# Patient Record
Sex: MALE | Race: WHITE | NOT HISPANIC OR LATINO | Employment: PART TIME | URBAN - METROPOLITAN AREA
[De-identification: names, ages, dates, MRNs, and addresses within clinical notes are randomized per-mention and may not be internally consistent; named-entity substitution may affect disease eponyms.]

---

## 2017-01-11 RX ORDER — GLYBURIDE 5 MG/1
TABLET ORAL
Qty: 180 TAB | Refills: 3 | Status: SHIPPED | OUTPATIENT
Start: 2017-01-11 | End: 2017-02-13

## 2017-01-30 ENCOUNTER — OFFICE VISIT (OUTPATIENT)
Dept: MEDICAL GROUP | Age: 62
End: 2017-01-30
Payer: MEDICARE

## 2017-01-30 VITALS
DIASTOLIC BLOOD PRESSURE: 74 MMHG | SYSTOLIC BLOOD PRESSURE: 130 MMHG | TEMPERATURE: 97.9 F | HEIGHT: 70 IN | BODY MASS INDEX: 42.95 KG/M2 | HEART RATE: 77 BPM | WEIGHT: 300 LBS | OXYGEN SATURATION: 98 %

## 2017-01-30 DIAGNOSIS — Z12.11 SCREENING FOR COLON CANCER: ICD-10-CM

## 2017-01-30 DIAGNOSIS — E78.5 DYSLIPIDEMIA: ICD-10-CM

## 2017-01-30 DIAGNOSIS — I25.2 HISTORY OF ACUTE ANTERIOR WALL MYOCARDIAL INFARCTION: ICD-10-CM

## 2017-01-30 DIAGNOSIS — I10 ESSENTIAL HYPERTENSION: ICD-10-CM

## 2017-01-30 PROCEDURE — G8598 ASA/ANTIPLAT THER USED: HCPCS | Performed by: INTERNAL MEDICINE

## 2017-01-30 PROCEDURE — G8432 DEP SCR NOT DOC, RNG: HCPCS | Performed by: INTERNAL MEDICINE

## 2017-01-30 PROCEDURE — 99214 OFFICE O/P EST MOD 30 MIN: CPT | Performed by: INTERNAL MEDICINE

## 2017-01-30 PROCEDURE — G8482 FLU IMMUNIZE ORDER/ADMIN: HCPCS | Performed by: INTERNAL MEDICINE

## 2017-01-30 PROCEDURE — G8419 CALC BMI OUT NRM PARAM NOF/U: HCPCS | Performed by: INTERNAL MEDICINE

## 2017-01-30 PROCEDURE — 3045F PR MOST RECENT HEMOGLOBIN A1C LEVEL 7.0-9.0%: CPT | Performed by: INTERNAL MEDICINE

## 2017-01-30 PROCEDURE — 3017F COLORECTAL CA SCREEN DOC REV: CPT | Mod: 1P | Performed by: INTERNAL MEDICINE

## 2017-01-30 PROCEDURE — 1036F TOBACCO NON-USER: CPT | Performed by: INTERNAL MEDICINE

## 2017-01-30 RX ORDER — INSULIN GLARGINE 100 [IU]/ML
30 INJECTION, SOLUTION SUBCUTANEOUS EVERY EVENING
Qty: 10 ML | Refills: 6 | Status: SHIPPED | OUTPATIENT
Start: 2017-01-30 | End: 2017-03-01

## 2017-01-30 RX ORDER — METOPROLOL SUCCINATE 100 MG/1
100 TABLET, EXTENDED RELEASE ORAL DAILY
Qty: 90 TAB | Refills: 3 | Status: SHIPPED | OUTPATIENT
Start: 2017-01-30 | End: 2018-07-05 | Stop reason: SDUPTHER

## 2017-01-30 RX ORDER — MOEXIPRIL HYDROCHLORIDE 7.5 MG/1
3.75 TABLET, FILM COATED ORAL DAILY
Qty: 45 TAB | Refills: 3 | Status: SHIPPED | OUTPATIENT
Start: 2017-01-30 | End: 2018-02-21 | Stop reason: SDUPTHER

## 2017-01-30 ASSESSMENT — PATIENT HEALTH QUESTIONNAIRE - PHQ9: CLINICAL INTERPRETATION OF PHQ2 SCORE: 1

## 2017-01-30 NOTE — MR AVS SNAPSHOT
"Perez Leone   2017 2:20 PM   Office Visit   MRN: [id number]    Department:  [address] [place] [address]   Dept Phone:  [phone]    Description:  Male : 1955   Provider:  Elle Cooper M.D.           Reason for Visit     Follow-Up labs      Allergies as of 2017     No Known Allergies      You were diagnosed with     History of acute anterior wall myocardial infarction   [213541]       Unspecified essential hypertension   [401.9.ICD-9-CM]       Uncontrolled type 2 diabetes mellitus without complication, with long-term current use of insulin (CMS-[facility])   [1539221]       Dyslipidemia   [076520]       Essential hypertension   [0292328]       Screening for colon cancer   [451045]         Vital Signs     Blood Pressure Pulse Temperature Height Weight Body Mass Index    130/74 mmHg 77 36.6 °C (97.9 °F) 1.778 m (5' 10\") 136.079 kg (300 lb) 43.05 kg/m2    Oxygen Saturation Smoking Status                98% Never Smoker           Basic Information     Date Of Birth Sex Race Ethnicity Preferred Language    1955 Male White Non- English      Your appointments     Feb 15, 2017  2:20 PM   Diabetes Care Visit with Aiden Daniel M.D., ENDOCRINOLOGY DIABETES RN   [facility]    [address] [facility], [address]  [facility] [address] [phone]           You will be receiving a confirmation call a few days before your appointment from our automated call confirmation system.            May 08, 2017 11:00 AM   Established Patient with Elle Cooper M.D.   [address] [address] [phone]           You will be receiving a confirmation call a few days before your appointment from our automated call confirmation system.              Problem List              ICD-10-CM Priority Class Noted - Resolved    History of acute anterior wall myocardial infarction I25.2   3/9/2012 - Present    CAD " BMS LAD I25.10   3/9/2012 - Present    Obesity E66.9   3/9/2012 - Present    Heme positive stool R19.5   6/8/2015 - Present    Type II diabetes mellitus, uncontrolled (CMS-HCC) E11.65   12/29/2015 - Present    Dyslipidemia E78.5   7/11/2016 - Present    Essential hypertension I10   7/11/2016 - Present      Health Maintenance        Date Due Completion Dates    IMM DTaP/Tdap/Td Vaccine (1 - Tdap) 3/27/1974 ---    COLONOSCOPY 3/27/2005 ---    RETINAL SCREENING 2/15/2015 2/15/2014 (Done)    Override on 2/15/2014: Done (told no DM changes.)    IMM ZOSTER VACCINE 3/27/2015 ---    A1C SCREENING 7/9/2017 1/9/2017, 10/3/2016, 7/11/2016, 3/14/2016, 12/29/2015, 6/23/2015, 8/15/2014 (Done), 1/13/2014, 10/19/2011, 9/6/2008    Override on 8/15/2014: Done (10.8% ( done as POC))    DIABETES MONOFILAMENT / LE EXAM 7/11/2017 7/11/2016, 12/29/2015    FASTING LIPID PROFILE 1/9/2018 1/9/2017, 3/14/2016, 6/23/2015, 1/13/2014, 10/23/2012    URINE ACR / MICROALBUMIN 1/9/2018 1/9/2017, 3/14/2016, 6/23/2015, 1/13/2014    SERUM CREATININE 1/9/2018 1/9/2017, 10/3/2016, 3/14/2016, 6/23/2015, 1/13/2014, 9/6/2008            Current Immunizations     Influenza Vaccine Quad Inj (Preserved) 10/24/2016, 9/30/2015  5:18 PM    Pneumococcal polysaccharide vaccine (PPSV-23) 3/30/2016      Below and/or attached are the medications your provider expects you to take. Review all of your home medications and newly ordered medications with your provider and/or pharmacist. Follow medication instructions as directed by your provider and/or pharmacist. Please keep your medication list with you and share with your provider. Update the information when medications are discontinued, doses are changed, or new medications (including over-the-counter products) are added; and carry medication information at all times in the event of emergency situations     Allergies:  No Known Allergies          Medications  Valid as of: January 30, 2017 -  3:43 PM    Generic Name  Brand Name Tablet Size Instructions for use    Aspirin (Tablet Delayed Response) [facility] ASPIRIN 81 MG QAM.         Atorvastatin Calcium (Tab) LIPITOR 40 MG Take 1 Tab by mouth every evening.        Glucose Blood (Strip) FREESTYLE LITE  USE STRIP TO CHECK GLUCOSE TWICE DAILY *E119*        GlyBURIDE (Tab) DIABETA 5 MG TAKE ONE TABLET BY MOUTH TWICE DAILY WITH  MEALS        Insulin Glargine (Solution) LANTUS 100 UNIT/ML Inject 30 Units as instructed every evening.        MetFORMIN HCl (Tab) GLUCOPHAGE 1000 MG TAKE ONE TABLET BY MOUTH TWICE DAILY WITH MEALS        Metoprolol Succinate (TABLET SR 24 HR) TOPROL  MG Take 1 Tab by mouth every day.        Moexipril HCl (Tab) UNIVASC 7.5 MG Take 0.5 Tabs by mouth every day.        Needles & Syringes (Misc) Needles & Syringes  Use with lantus insulin 20 units hs.        Niacin (Antihyperlipidemic) (Tab CR) NIASPAN 500 MG Take 1 Tab by mouth 3 times a day.        Nitroglycerin (SL Tab) NITROSTAT 0.4 MG Place 1 Tab under tongue as needed.        SITagliptin Phosphate (Tab) JANUVIA 100 MG TAKE ONE TABLET BY MOUTH ONCE DAILY        .                 Medicines prescribed today were sent to:     [facility] [address] [place] [address] (S), [place] - [id number] [facility] (S) [place] [address]    Phone: [phone] Fax: [phone]    Open 24 Hours?: No      Medication refill instructions:       If your prescription bottle indicates you have medication refills left, it is not necessary to call your provider’s office. Please contact your pharmacy and they will refill your medication.    If your prescription bottle indicates you do not have any refills left, you may request refills at any time through one of the following ways: The online [employer] system (except Urgent Care), by calling your provider’s office, or by asking your pharmacy to contact your provider’s office with a refill request. Medication refills are processed only during regular business hours and may not be  available until the next business day. Your provider may request additional information or to have a follow-up visit with you prior to refilling your medication.   *Please Note: Medication refills are assigned a new Rx number when refilled electronically. Your pharmacy may indicate that no refills were authorized even though a new prescription for the same medication is available at the pharmacy. Please request the medicine by name with the pharmacy before contacting your provider for a refill.        Your To Do List     Future Labs/Procedures Complete By Expires    CBC WITH DIFFERENTIAL  As directed 1/31/2018    COMP METABOLIC PANEL  As directed 1/31/2018    HEMOGLOBIN A1C  As directed 1/31/2018    LIPID PROFILE  As directed 1/31/2018      Referral     A referral request has been sent to our patient care coordination department. Please allow 3-5 business days for us to process this request and contact you either by phone or mail. If you do not hear from us by the 5th business day, please call us at [phone].           [employer] Access Code: [email or web address]  Expires: 3/1/2017  1:53 PM    [employer]  A secure, online tool to manage your health information     [employer]’s [employer]® is a secure, online tool that connects you to your personalized health information from the privacy of your home -- day or night - making it very easy for you to manage your healthcare. Once the activation process is completed, you can even access your medical information using the [employer] savannah, which is available for free in the [employer] Savannah store or Google Play store.     [employer] provides the following levels of access (as shown below):   My Chart Features   [facility] Doctor [facility]  Specialists [facility]  Urgent  Care Non-[facility]  Primary Care  Doctor   Email your healthcare team securely and privately 24/7 X X X    Manage appointments: schedule your next appointment; view details of past/upcoming appointments X      Request  prescription refills. X      View recent personal medical records, including lab and immunizations X X X X   View health record, including health history, allergies, medications X X X X   Read reports about your outpatient visits, procedures, consult and ER notes X X X X   See your discharge summary, which is a recap of your hospital and/or ER visit that includes your diagnosis, lab results, and care plan. X X       How to register for [employer]:  1. Go to  [email or web address].  2. Click on the Sign Up Now box, which takes you to the New Member Sign Up page. You will need to provide the following information:  a. Enter your [employer] Access Code exactly as it appears at the top of this page. (You will not need to use this code after you’ve completed the sign-up process. If you do not sign up before the expiration date, you must request a new code.)   b. Enter your date of birth.   c. Enter your home email address.   d. Click Submit, and follow the next screen’s instructions.  3. Create a [employer] ID. This will be your [employer] login ID and cannot be changed, so think of one that is secure and easy to remember.  4. Create a [employer] password. You can change your password at any time.  5. Enter your Password Reset Question and Answer. This can be used at a later time if you forget your password.   6. Enter your e-mail address. This allows you to receive e-mail notifications when new information is available in [employer].  7. Click Sign Up. You can now view your health information.    For assistance activating your [employer] account, call [phone]

## 2017-01-31 NOTE — ASSESSMENT & PLAN NOTE
He is taking Lipitor 40 mg every afternoon and niacin 500 mg daily. He denies side effects from taking Lipitor and niacin. Liver enzymes within normal. LDL at goal.    Results for EFREN DE GUZMAN (MRN [id number]) as of 1/30/2017 19:09   Ref. Range 1/9/2017 07:34   Cholesterol,Tot Latest Ref Range: 100-199 mg/dL 127   Triglycerides Latest Ref Range: 0-149 mg/dL 90   HDL Latest Ref Range: >39 mg/dL 34 (L)   LDL Latest Ref Range: 0-99 mg/dL 75   VLDL Cholesterol Calc Latest Ref Range: 5-40 mg/dL 18

## 2017-01-31 NOTE — ASSESSMENT & PLAN NOTE
Patient is taking metoprolol  mg daily and moexipril 3.75 mg daily. He denies side effects from taking blood pressure medications. His blood pressure is stable with current medication.

## 2017-01-31 NOTE — ASSESSMENT & PLAN NOTE
Patient's recent A1c was 8.1. We increase insulin Lantus from 20 units every afternoon to 25 units every afternoon on 10/24/16. He reported taking metformin 1000 mg twice a day, glyburide 5 mg twice a day, Januvia 100 mg daily, Lantus 25 units every afternoon. He denied missing the dose and stated that he took all medications regularly. We discussed to she which Januvia to GLP-1 agonist last visit and today. However, patient declined to switch the medication. He agreed to increase Lantus insulin to 30 units every afternoon. He will discuss with endocrinologist in upcoming appointment for establish care on 2/15/17.   It is questionable that he is compliant with diet. He also reported that he did not have any physical exercise.  Patient denies side effect from taking glyburide, metformin, Januvia, Lantus insulin. He is overdue for retinal exam. He was referred to ophthalmologist, but did not schedule for follow-up. Reprinted contact information of ophthalmologist and advised to have retinal exam as soon as possible.

## 2017-01-31 NOTE — ASSESSMENT & PLAN NOTE
Patient has history of MI and has stent on LAD. He follows with cardiologist, Dr. Sung. Last visit with  was on 12/19/16. He is taking aspirin 81 mg daily, metoprolol  mg daily, moexipril 7.5 mg half tablet daily. He does not have recurrent chest pain and has not required to take nitroglycerin.

## 2017-01-31 NOTE — PROGRESS NOTES
Subjective:   Perez De Guzman is a 61 y.o. male here today for evaluation and management of:      History of acute anterior wall myocardial infarction  Patient has history of MI and has stent on LAD. He follows with cardiologist, Dr. Sung. Last visit with  was on 12/19/16. He is taking aspirin 81 mg daily, metoprolol  mg daily, moexipril 7.5 mg half tablet daily. He does not have recurrent chest pain and has not required to take nitroglycerin.    Type II diabetes mellitus, uncontrolled  Patient's recent A1c was 8.1. We increase insulin Lantus from 20 units every afternoon to 25 units every afternoon on 10/24/16. He reported taking metformin 1000 mg twice a day, glyburide 5 mg twice a day, Januvia 100 mg daily, Lantus 25 units every afternoon. He denied missing the dose and stated that he took all medications regularly. We discussed to she which Januvia to GLP-1 agonist last visit and today. However, patient declined to switch the medication. He agreed to increase Lantus insulin to 30 units every afternoon. He will discuss with endocrinologist in upcoming appointment for establish care on 2/15/17.   It is questionable that he is compliant with diet. He also reported that he did not have any physical exercise.  Patient denies side effect from taking glyburide, metformin, Januvia, Lantus insulin. He is overdue for retinal exam. He was referred to ophthalmologist, but did not schedule for follow-up. Reprinted contact information of ophthalmologist and advised to have retinal exam as soon as possible.    Dyslipidemia  He is taking Lipitor 40 mg every afternoon and niacin 500 mg daily. He denies side effects from taking Lipitor and niacin. Liver enzymes within normal. LDL at goal.    Results for PEREZ DE GUZMAN (MRN [id number]) as of 1/30/2017 19:09   Ref. Range 1/9/2017 07:34   Cholesterol,Tot Latest Ref Range: 100-199 mg/dL 127   Triglycerides Latest Ref Range: 0-149 mg/dL 90   HDL Latest Ref Range:  ">39 mg/dL 34 (L)   LDL Latest Ref Range: 0-99 mg/dL 75   VLDL Cholesterol Calc Latest Ref Range: 5-40 mg/dL 18       Essential hypertension  Patient is taking metoprolol  mg daily and moexipril 3.75 mg daily. He denies side effects from taking blood pressure medications. His blood pressure is stable with current medication.         Current medicines (including changes today)  Current Outpatient Prescriptions   Medication Sig Dispense Refill   • metoprolol SR (TOPROL XL) 100 MG TABLET SR 24 HR Take 1 Tab by mouth every day. 90 Tab 3   • moexipril (UNIVASC) 7.5 MG Tab Take 0.5 Tabs by mouth every day. 45 Tab 3   • insulin glargine (LANTUS) 100 UNIT/ML Solution Inject 30 Units as instructed every evening. 10 mL 6   • glyBURIDE (DIABETA) 5 MG Tab TAKE ONE TABLET BY MOUTH TWICE DAILY WITH  MEALS 180 Tab 3   • FREESTYLE LITE strip USE STRIP TO CHECK GLUCOSE TWICE DAILY *E119* 100 Strip 0   • metformin (GLUCOPHAGE) 1000 MG tablet TAKE ONE TABLET BY MOUTH TWICE DAILY WITH MEALS 180 Tab 3   • JANUVIA 100 MG Tab TAKE ONE TABLET BY MOUTH ONCE DAILY 90 Tab 3   • nitroglycerin (NITROSTAT) 0.4 MG SL Tab Place 1 Tab under tongue as needed. 10 Tab 0   • Needles & Syringes MISC Use with lantus insulin 20 units hs. 90 Each 3   • atorvastatin (LIPITOR) 40 MG TABS Take 1 Tab by mouth every evening. 90 Each 1   • niacin SR (NIASPAN) 500 MG TBCR Take 1 Tab by mouth 3 times a day. 90 Each 6   • [facility] ASPIRIN 81 MG PO TBEC QAM.        No current facility-administered medications for this visit.     He  has a past medical history of Overweight and obesity; Pure hypercholesterolemia; Acute MI (CMS-HCC); Diabetes mellitus; Hypertension; and Heme positive stool (6/8/2015).    ROS   No chest pain, no shortness of breath, no abdominal pain       Objective:     Blood pressure 130/74, pulse 77, temperature 36.6 °C (97.9 °F), height 1.778 m (5' 10\"), weight 136.079 kg (300 lb), SpO2 98 %. Body mass index is 43.05 kg/(m^2).   Physical " Exam:  General: Alert, oriented and no acute distress.  Eye contact is good, speech goal directed, affect calm  HEENT: conjunctiva non-injected, sclera non-icteric.  Oral mucous membranes pink and moist with no lesions.  Pinna normal.   Lungs: Normal respiratory effort, clear to auscultation bilaterally with good excursion.  CV: regular rate and rhythm. No carotid bruits.  Abdomen: soft, non distended, nontender, No CVAT, Bowel sound normal.  Ext: no edema, color normal, vascularity normal, temperature normal        Assessment and Plan:   The following treatment plan was discussed     1. History of acute anterior wall myocardial infarction  - Chronic and stable. No recurrent chest pain or shortness of breath. Continue current regimens. Follow-up with cardiologist as scheduled.  - Sedentary Lifestyle. Encourage cardio exercise.  - metoprolol SR (TOPROL XL) 100 MG TABLET SR 24 HR; Take 1 Tab by mouth every day.  Dispense: 90 Tab; Refill: 3  - moexipril (UNIVASC) 7.5 MG Tab; Take 0.5 Tabs by mouth every day.  Dispense: 45 Tab; Refill: 3    2. Uncontrolled type 2 diabetes mellitus without complication, with long-term current use of insulin (CMS-HCC)  - Not well-controlled. Refused to try GLP-1 agonist. He will follow with endocrinologist on 2/15/17 for his diabetes.  - Recommended to increase Lantus insulin from 25 units to 30 units every afternoon. Continue metformin 1000 mg twice a day glyburide 5 mg twice a day and Januvia 100 mg daily.  - Recommend to check fasting blood sugar and 2 hour after meal. Seen  - Counseled to comply with medication and diet.   - Counseled signs and symptoms of hypoglycemia and management of hypoglycemia.   - Recommend to have retinal eye exam once a year.  - Advised to check both feet daily.    - insulin glargine (LANTUS) 100 UNIT/ML Solution; Inject 30 Units as instructed every evening.  Dispense: 10 mL; Refill: 6  - COMP METABOLIC PANEL; Future  - HEMOGLOBIN A1C; Future    3.  Dyslipidemia  - Well-controlled. Continue current regimens. Recheck lab 1-2 weeks before next follow up visit.  - COMP METABOLIC PANEL; Future  - LIPID PROFILE; Future    4. Essential hypertension  - Well-controlled. Continue current regimens. Recheck lab 1-2 weeks before next follow up visit.  - metoprolol SR (TOPROL XL) 100 MG TABLET SR 24 HR; Take 1 Tab by mouth every day.  Dispense: 90 Tab; Refill: 3  - moexipril (UNIVASC) 7.5 MG Tab; Take 0.5 Tabs by mouth every day.  Dispense: 45 Tab; Refill: 3  - COMP METABOLIC PANEL; Future  - CBC WITH DIFFERENTIAL; Future    5. Screening for colon cancer  - Patient has heme-positive stool test in 2014. He was referred to GI, but he did not make appointment. He had colonoscopy 5-6 years ago. He did not recall the report. He did not know where he did his colonoscopy.  - We will refer to GI again for screening colon cancer and history of heme-positive stool test.  - REFERRAL TO GASTROENTEROLOGY      Followup: Return in about 4 months (around 5/30/2017), or if symptoms worsen or fail to improve, for diabetes, hypercholesterolemia, hypertension, history of MI, obesity, lab review.      Please note that this dictation was created using voice recognition software. I have made every reasonable attempt to correct obvious errors, but I expect that there may have unintended errors in text, spelling, punctuation, or grammar that I did not discover.

## 2017-02-13 ENCOUNTER — OFFICE VISIT (OUTPATIENT)
Dept: ENDOCRINOLOGY | Facility: MEDICAL CENTER | Age: 62
End: 2017-02-13
Payer: MEDICARE

## 2017-02-13 VITALS
HEIGHT: 72 IN | BODY MASS INDEX: 41.45 KG/M2 | DIASTOLIC BLOOD PRESSURE: 84 MMHG | WEIGHT: 306 LBS | HEART RATE: 74 BPM | OXYGEN SATURATION: 92 % | SYSTOLIC BLOOD PRESSURE: 128 MMHG

## 2017-02-13 DIAGNOSIS — E78.5 TYPE 2 DIABETES MELLITUS WITH HYPERLIPIDEMIA (HCC): ICD-10-CM

## 2017-02-13 DIAGNOSIS — E66.01 MORBID OBESITY DUE TO EXCESS CALORIES (HCC): ICD-10-CM

## 2017-02-13 DIAGNOSIS — Z79.4 ENCOUNTER FOR LONG-TERM (CURRENT) USE OF INSULIN (HCC): ICD-10-CM

## 2017-02-13 DIAGNOSIS — I10 ESSENTIAL HYPERTENSION: ICD-10-CM

## 2017-02-13 DIAGNOSIS — E11.00 UNCONTROLLED TYPE 2 DIABETES MELLITUS WITH HYPEROSMOLARITY WITHOUT COMA, WITH LONG-TERM CURRENT USE OF INSULIN (HCC): ICD-10-CM

## 2017-02-13 DIAGNOSIS — Z79.4 UNCONTROLLED TYPE 2 DIABETES MELLITUS WITH HYPEROSMOLARITY WITHOUT COMA, WITH LONG-TERM CURRENT USE OF INSULIN (HCC): ICD-10-CM

## 2017-02-13 DIAGNOSIS — E11.69 TYPE 2 DIABETES MELLITUS WITH HYPERLIPIDEMIA (HCC): ICD-10-CM

## 2017-02-13 PROCEDURE — G8598 ASA/ANTIPLAT THER USED: HCPCS | Performed by: PHYSICIAN ASSISTANT

## 2017-02-13 PROCEDURE — 99214 OFFICE O/P EST MOD 30 MIN: CPT | Mod: 25 | Performed by: PHYSICIAN ASSISTANT

## 2017-02-13 PROCEDURE — 3017F COLORECTAL CA SCREEN DOC REV: CPT | Mod: 1P | Performed by: PHYSICIAN ASSISTANT

## 2017-02-13 PROCEDURE — G8419 CALC BMI OUT NRM PARAM NOF/U: HCPCS | Performed by: PHYSICIAN ASSISTANT

## 2017-02-13 PROCEDURE — 3045F PR MOST RECENT HEMOGLOBIN A1C LEVEL 7.0-9.0%: CPT | Performed by: PHYSICIAN ASSISTANT

## 2017-02-13 PROCEDURE — G8482 FLU IMMUNIZE ORDER/ADMIN: HCPCS | Performed by: PHYSICIAN ASSISTANT

## 2017-02-13 PROCEDURE — 1036F TOBACCO NON-USER: CPT | Performed by: PHYSICIAN ASSISTANT

## 2017-02-13 RX ORDER — EMPAGLIFLOZIN AND METFORMIN HYDROCHLORIDE 12.5; 1 MG/1; MG/1
1 TABLET ORAL 2 TIMES DAILY
Qty: 60 TAB | Refills: 3 | COMMUNITY
Start: 2017-02-13 | End: 2017-02-22 | Stop reason: SDUPTHER

## 2017-02-13 RX ORDER — LIRAGLUTIDE 6 MG/ML
1.8 INJECTION SUBCUTANEOUS DAILY
Qty: 3 PEN | Refills: 6 | COMMUNITY
Start: 2017-02-13 | End: 2017-02-22 | Stop reason: SDUPTHER

## 2017-02-13 NOTE — PATIENT INSTRUCTIONS
Blood glucose log: Check BG in the morning when wake up, before lunch or dinner and before bed.  So three times a day.  Always bring BG diary to the next office visit.     STOP   Januvia 100mg   Glyburide 5mg twice a day  Metformin 1000mg twice a day    START   Victoza 0.6 injection at night  Synjardy 12.5/1000 one in AM one in PM  LANTUS REDUCE to 20units at night.

## 2017-02-13 NOTE — MR AVS SNAPSHOT
"        Perez Leone   2017 11:20 AM   Office Visit   MRN: [id number]    Department:  Endocrinology [facility]   Dept Phone:  [phone]    Description:  Male : 1955   Provider:  Gulshan Mota PA-C           Allergies as of 2017     No Known Allergies      You were diagnosed with     Uncontrolled type 2 diabetes mellitus with hyperosmolarity without coma, with long-term current use of insulin (CMS-HCC)   [5298010]       Encounter for long-term (current) use of insulin (CMS-HCC)   [V58.67.ICD-9-CM]       Essential hypertension   [5340185]       Type 2 diabetes mellitus with hyperlipidemia (CMS-HCC)   [9181439]       Morbid obesity due to excess calories (CMS-HCC)   [4995643]         Vital Signs     Blood Pressure Pulse Height Weight Body Mass Index Oxygen Saturation    128/84 mmHg 74 1.829 m (6' 0.01\") 138.801 kg (306 lb) 41.49 kg/m2 92%    Smoking Status                   Never Smoker            Basic Information     Date Of Birth Sex Race Ethnicity Preferred Language    1955 Male White Non- English      Your appointments     2017  9:45 AM   Established Patient with Gulshan Mota PA-C   [facility]    [address], [address]  [facility] [address] [phone]           You will be receiving a confirmation call a few days before your appointment from our automated call confirmation system.            May 08, 2017 11:00 AM   Established Patient with Elle Cooper M.D.   [address] [address] [phone]           You will be receiving a confirmation call a few days before your appointment from our automated call confirmation system.              Problem List              ICD-10-CM Priority Class Noted - Resolved    History of acute anterior wall myocardial infarction I25.2   3/9/2012 - Present    CAD BMS LAD I25.10   3/9/2012 - Present    Obesity " E66.9   3/9/2012 - Present    Heme positive stool R19.5   6/8/2015 - Present    Type II diabetes mellitus, uncontrolled (CMS-HCC) E11.65   12/29/2015 - Present    Dyslipidemia E78.5   7/11/2016 - Present    Essential hypertension I10   7/11/2016 - Present    Encounter for long-term (current) use of insulin (CMS-HCC) Z79.4   2/13/2017 - Present    Type 2 diabetes mellitus with hyperlipidemia (CMS-HCC) E11.69, E78.5   2/13/2017 - Present      Health Maintenance        Date Due Completion Dates    IMM DTaP/Tdap/Td Vaccine (1 - Tdap) 3/27/1974 ---    COLONOSCOPY 3/27/2005 ---    RETINAL SCREENING 2/15/2015 2/15/2014 (Done)    Override on 2/15/2014: Done (told no DM changes.)    IMM ZOSTER VACCINE 3/27/2015 ---    A1C SCREENING 7/9/2017 1/9/2017, 10/3/2016, 7/11/2016, 3/14/2016, 12/29/2015, 6/23/2015, 8/15/2014 (Done), 1/13/2014, 10/19/2011, 9/6/2008    Override on 8/15/2014: Done (10.8% ( done as POC))    DIABETES MONOFILAMENT / LE EXAM 7/11/2017 7/11/2016, 12/29/2015    FASTING LIPID PROFILE 1/9/2018 1/9/2017, 3/14/2016, 6/23/2015, 1/13/2014, 10/23/2012    URINE ACR / MICROALBUMIN 1/9/2018 1/9/2017, 3/14/2016, 6/23/2015, 1/13/2014    SERUM CREATININE 1/9/2018 1/9/2017, 10/3/2016, 3/14/2016, 6/23/2015, 1/13/2014, 9/6/2008            Current Immunizations     Influenza Vaccine Quad Inj (Preserved) 10/24/2016, 9/30/2015  5:18 PM    Pneumococcal polysaccharide vaccine (PPSV-23) 3/30/2016      Below and/or attached are the medications your provider expects you to take. Review all of your home medications and newly ordered medications with your provider and/or pharmacist. Follow medication instructions as directed by your provider and/or pharmacist. Please keep your medication list with you and share with your provider. Update the information when medications are discontinued, doses are changed, or new medications (including over-the-counter products) are added; and carry medication information at all times in the event of  emergency situations     Allergies:  No Known Allergies          Medications  Valid as of: February 13, 2017 - 11:36 AM    Generic Name Brand Name Tablet Size Instructions for use    Aspirin (Tablet Delayed Response) ST SANCHEZ ASPIRIN 81 MG QAM.         Atorvastatin Calcium (Tab) LIPITOR 40 MG Take 1 Tab by mouth every evening.        Empagliflozin-Metformin HCl (Tab) SYNJARDY 12.5-1000 MG Take 1 tablet by mouth 2 Times a Day.        Glucose Blood (Strip) FREESTYLE LITE  USE STRIP TO CHECK GLUCOSE TWICE DAILY *E119*        Insulin Glargine (Solution) LANTUS 100 UNIT/ML Inject 30 Units as instructed every evening.        Liraglutide (Solution Pen-injector) VICTOZA 18 MG/3ML Inject 0.3 mL as instructed every day.        Metoprolol Succinate (TABLET SR 24 HR) TOPROL  MG Take 1 Tab by mouth every day.        Moexipril HCl (Tab) UNIVASC 7.5 MG Take 0.5 Tabs by mouth every day.        Needles & Syringes (Misc) Needles & Syringes  Use with lantus insulin 20 units hs.        Niacin (Antihyperlipidemic) (Tab CR) NIASPAN 500 MG Take 1 Tab by mouth 3 times a day.        Nitroglycerin (SL Tab) NITROSTAT 0.4 MG Place 1 Tab under tongue as needed.        .                 Medicines prescribed today were sent to:     [facility] [address] [place] [address] (S), [place] - [address] [address] [facility] (S) [place] [address]    Phone: [phone] Fax: [phone]    Open 24 Hours?: No      Medication refill instructions:       If your prescription bottle indicates you have medication refills left, it is not necessary to call your provider’s office. Please contact your pharmacy and they will refill your medication.    If your prescription bottle indicates you do not have any refills left, you may request refills at any time through one of the following ways: The online [employer] system (except Urgent Care), by calling your provider’s office, or by asking your pharmacy to contact your provider’s office with a refill request. Medication  refills are processed only during regular business hours and may not be available until the next business day. Your provider may request additional information or to have a follow-up visit with you prior to refilling your medication.   *Please Note: Medication refills are assigned a new Rx number when refilled electronically. Your pharmacy may indicate that no refills were authorized even though a new prescription for the same medication is available at the pharmacy. Please request the medicine by name with the pharmacy before contacting your provider for a refill.        Instructions    Blood glucose log: Check BG in the morning when wake up, before lunch or dinner and before bed.  So three times a day.  Always bring BG diary to the next office visit.     STOP   Januvia 100mg   Glyburide 5mg twice a day  Metformin 1000mg twice a day    START   Victoza 0.6 injection at night  Synjardy 12.5/1000 one in AM one in PM  LANTUS REDUCE to 20units at night.          [employer] Access Code: [email or web address]  Expires: 3/1/2017  1:53 PM    Accellion  A secure, online tool to manage your health information     [employer] Accellion® is a secure, online tool that connects you to your personalized health information from the privacy of your home -- day or night - making it very easy for you to manage your healthcare. Once the activation process is completed, you can even access your medical information using the Accellion savannah, which is available for free in the [employer] store or Google Play store.     Accellion provides the following levels of access (as shown below):   My Chart Features   [facility]  Specialists [facility]  Urgent  Care Non-[facility]  Primary Care  Doctor   Email your healthcare team securely and privately 24/7 X X X    Manage appointments: schedule your next appointment; view details of past/upcoming appointments X      Request prescription refills. X      View recent personal medical records,  including lab and immunizations X X X X   View health record, including health history, allergies, medications X X X X   Read reports about your outpatient visits, procedures, consult and ER notes X X X X   See your discharge summary, which is a recap of your hospital and/or ER visit that includes your diagnosis, lab results, and care plan. X X       How to register for [employer]:  1. Go to  [email or web address].  2. Click on the Sign Up Now box, which takes you to the New Member Sign Up page. You will need to provide the following information:  a. Enter your [employer] Access Code exactly as it appears at the top of this page. (You will not need to use this code after you’ve completed the sign-up process. If you do not sign up before the expiration date, you must request a new code.)   b. Enter your date of birth.   c. Enter your home email address.   d. Click Submit, and follow the next screen’s instructions.  3. Create a [employer] ID. This will be your [employer] login ID and cannot be changed, so think of one that is secure and easy to remember.  4. Create a [employer] password. You can change your password at any time.  5. Enter your Password Reset Question and Answer. This can be used at a later time if you forget your password.   6. Enter your e-mail address. This allows you to receive e-mail notifications when new information is available in [employer].  7. Click Sign Up. You can now view your health information.    For assistance activating your [employer] account, call [phone]

## 2017-02-13 NOTE — PROGRESS NOTES
New Patient Consult Note  Referred by: Elle Cooper M.D.    Reason for consult: Diabetes Management Type 2    HPI:  Perez Leone is a 61 y.o. old patient who is seeing us today for diabetes care.  This is a pleasant patient with diabetes and I appreciate the opportunity to participate in the care of this patient.  This is a new patient with me today.    Labs of 1/9/2017 he has a Cr. Of 1.1, HbA1c of 8.1, GFR 66, LDL 75, HDL 34,     BG Diary: did not bring    1. Uncontrolled type 2 diabetes mellitus with hyperosmolarity without coma, with long-term current use of insulin (CMS-HCC)  This patient is currently on Glyburide 5mg BID, Lantus 30, Januvia 100, and Metformin 1000BID      2. Encounter for long-term (current) use of insulin (CMS-HCC)  He is on Lantus currently    3. Essential hypertension  He is on Toprol    4. Type 2 diabetes mellitus with hyperlipidemia (CMS-HCC)  He is on lipitor        ROS:   Constitutional: No change in weight , No fatigue, No night sweats.  HEENT: No Headache.  Eyes:  No blurred vision, No visual changes.  Cardiac: No chest pain, No palpitations.  Resp: No shortness of breath, No cough,   Gastro: No nausea or vomiting, No diarrhea.  Neuro: Denies numbness or tinging in bilateral feet or hands, and no loss of sensation.  Endo: No heat or cold intolerance.  : No polyuria, No polydipsia, No chronic UTI's.  Lower extremities: No lower leg edema bilateral.  All other systems were reviewed and were negative.    Past Medical History:  Patient Active Problem List    Diagnosis Date Noted   • Encounter for long-term (current) use of insulin (CMS-HCC) 02/13/2017   • Type 2 diabetes mellitus with hyperlipidemia (CMS-HCC) 02/13/2017   • Dyslipidemia 07/11/2016   • Essential hypertension 07/11/2016   • Type II diabetes mellitus, uncontrolled (CMS-HCC) 12/29/2015   • Heme positive stool 06/08/2015   • History of acute anterior wall myocardial infarction 03/09/2012   • CAD BMS LAD 03/09/2012    • Obesity 03/09/2012       Past Surgical History:  No past surgical history on file.    Allergies:  Review of patient's allergies indicates no known allergies.    Social History:  Social History     Social History   • Marital Status: Single     Spouse Name: N/A   • Number of Children: N/A   • Years of Education: N/A     Occupational History   • Not on file.     Social History Main Topics   • Smoking status: Never Smoker    • Smokeless tobacco: Never Used   • Alcohol Use: No   • Drug Use: No   • Sexual Activity: No     Other Topics Concern   • Not on file     Social History Narrative    Single    Works at [employer] as a        Family History:  Family History   Problem Relation Age of Onset   • Heart Disease Mother    • Diabetes Brother        Medications:    Current outpatient prescriptions:   •  VICTOZA 18 MG/3ML Solution Pen-injector injection, Inject 0.3 mL as instructed every day., Disp: 3 PEN, Rfl: 6  •  SYNJARDY 12.5-1000 MG Tab, Take 1 tablet by mouth 2 Times a Day., Disp: 60 Tab, Rfl: 3  •  metoprolol SR (TOPROL XL) 100 MG TABLET SR 24 HR, Take 1 Tab by mouth every day., Disp: 90 Tab, Rfl: 3  •  moexipril (UNIVASC) 7.5 MG Tab, Take 0.5 Tabs by mouth every day., Disp: 45 Tab, Rfl: 3  •  insulin glargine (LANTUS) 100 UNIT/ML Solution, Inject 30 Units as instructed every evening., Disp: 10 mL, Rfl: 6  •  FREESTYLE LITE strip, USE STRIP TO CHECK GLUCOSE TWICE DAILY *E119*, Disp: 100 Strip, Rfl: 0  •  nitroglycerin (NITROSTAT) 0.4 MG SL Tab, Place 1 Tab under tongue as needed., Disp: 10 Tab, Rfl: 0  •  Needles & Syringes MISC, Use with lantus insulin 20 units hs., Disp: 90 Each, Rfl: 3  •  atorvastatin (LIPITOR) 40 MG TABS, Take 1 Tab by mouth every evening., Disp: 90 Each, Rfl: 1  •  niacin SR (NIASPAN) 500 MG TBCR, Take 1 Tab by mouth 3 times a day., Disp: 90 Each, Rfl: 6  •  [facility] ASPIRIN 81 MG PO TBEC, QAM. , Disp: , Rfl:     Labs:    Physical Examination:   Vital signs: BP  "128/84 mmHg  Pulse 74  Ht 1.829 m (6' 0.01\")  Wt 138.801 kg (306 lb)  BMI 41.49 kg/m2  SpO2 92%  General: No distress, cooperative, well dressed and well nourished.   Eyes: No scleral icterus or discharge, No hyposphagma  ENMT: Normal on external inspection of nose, lips, No nasal drainage   Neck: No abnormal masses on inspection  Resp: Normal effort, Bilateral clear to auscultation, No wheezing, No rales  CVS: Regular rate and rhythm, S1 S2 normal, No murmur. No gallop  Extremities: No edema bilateral extremities  Neuro: Alert and oriented  Skin: No rash, No Ulcers  Psych: Normal mood and affect  Foot exam: normal sensation to monofilament testing, normal pulses, no ulcers.  Normal Vibration quantitative sensation test.    Assessment and Plan:    1. Uncontrolled type 2 diabetes mellitus with hyperosmolarity without coma, with long-term current use of insulin (CMS-HCC)    STOP   Januvia 100mg   Glyburide 5mg twice a day  Metformin 1000mg twice a day    START   Victoza 0.6 injection at night  Synjardy 12.5/1000 one in AM one in PM  Lantus REDUCE to 20units at night    By getting him on an SGLT2 and Victoza he will start to loose weight. Removing the Glipizide will also help in this as well as a reduction of Lantus.  I am not sure he needs insulin once we get the Victoza up to 1.8 only time will tell.         2. Encounter for long-term (current) use of insulin (CMS-HCC)  I want to decrease Insulin because he is Morbidly obese and we need to get him to loose some weight.      3. Essential hypertension  Stable no changes made    4. Type 2 diabetes mellitus with hyperlipidemia (CMS-HCC)  Stable no change      Return in about 1 week (around 2/20/2017).I will see this patient once a week until he is under better control.     Blood glucose log: Check BG in the morning when wake up, before lunch or dinner and before bed.  So three times a day.  Always bring BG diary to the next office visit.     This patient during " there office visit today may have been started on new medication.  Side effects of new medications were discussed with the patient today in the office. The patient may have been supplied paperwork on this new medication.    Thank you kindly for allowing me to participate in the diabetes care plan for this patient.    Gulshan Mota PA-C, [facility]  02/13/2017    CC:   Elle Cooper M.D.

## 2017-02-22 ENCOUNTER — OFFICE VISIT (OUTPATIENT)
Dept: ENDOCRINOLOGY | Facility: MEDICAL CENTER | Age: 62
End: 2017-02-22
Payer: MEDICARE

## 2017-02-22 VITALS
SYSTOLIC BLOOD PRESSURE: 128 MMHG | HEART RATE: 112 BPM | OXYGEN SATURATION: 92 % | BODY MASS INDEX: 42.23 KG/M2 | HEIGHT: 70 IN | WEIGHT: 295 LBS | DIASTOLIC BLOOD PRESSURE: 76 MMHG

## 2017-02-22 DIAGNOSIS — Z79.4 UNCONTROLLED TYPE 2 DIABETES MELLITUS WITH HYPEROSMOLARITY WITHOUT COMA, WITH LONG-TERM CURRENT USE OF INSULIN (HCC): ICD-10-CM

## 2017-02-22 DIAGNOSIS — E11.69 TYPE 2 DIABETES MELLITUS WITH HYPERLIPIDEMIA (HCC): ICD-10-CM

## 2017-02-22 DIAGNOSIS — E11.00 UNCONTROLLED TYPE 2 DIABETES MELLITUS WITH HYPEROSMOLARITY WITHOUT COMA, WITH LONG-TERM CURRENT USE OF INSULIN (HCC): ICD-10-CM

## 2017-02-22 DIAGNOSIS — I10 ESSENTIAL HYPERTENSION: ICD-10-CM

## 2017-02-22 DIAGNOSIS — Z79.4 ENCOUNTER FOR LONG-TERM (CURRENT) USE OF INSULIN (HCC): ICD-10-CM

## 2017-02-22 DIAGNOSIS — E78.5 TYPE 2 DIABETES MELLITUS WITH HYPERLIPIDEMIA (HCC): ICD-10-CM

## 2017-02-22 PROCEDURE — 3045F PR MOST RECENT HEMOGLOBIN A1C LEVEL 7.0-9.0%: CPT | Performed by: PHYSICIAN ASSISTANT

## 2017-02-22 PROCEDURE — 99214 OFFICE O/P EST MOD 30 MIN: CPT | Mod: 25 | Performed by: PHYSICIAN ASSISTANT

## 2017-02-22 PROCEDURE — G8419 CALC BMI OUT NRM PARAM NOF/U: HCPCS | Performed by: PHYSICIAN ASSISTANT

## 2017-02-22 PROCEDURE — 3017F COLORECTAL CA SCREEN DOC REV: CPT | Mod: 1P | Performed by: PHYSICIAN ASSISTANT

## 2017-02-22 PROCEDURE — 1036F TOBACCO NON-USER: CPT | Performed by: PHYSICIAN ASSISTANT

## 2017-02-22 PROCEDURE — G8482 FLU IMMUNIZE ORDER/ADMIN: HCPCS | Performed by: PHYSICIAN ASSISTANT

## 2017-02-22 PROCEDURE — G8598 ASA/ANTIPLAT THER USED: HCPCS | Performed by: PHYSICIAN ASSISTANT

## 2017-02-22 RX ORDER — LIRAGLUTIDE 6 MG/ML
1.8 INJECTION SUBCUTANEOUS DAILY
Qty: 3 PEN | Refills: 6 | COMMUNITY
Start: 2017-02-22 | End: 2017-03-01 | Stop reason: SDUPTHER

## 2017-02-22 RX ORDER — EMPAGLIFLOZIN AND METFORMIN HYDROCHLORIDE 12.5; 1 MG/1; MG/1
1 TABLET ORAL 2 TIMES DAILY
Qty: 60 TAB | Refills: 3 | COMMUNITY
Start: 2017-02-22 | End: 2017-03-01 | Stop reason: SDUPTHER

## 2017-02-22 NOTE — MR AVS SNAPSHOT
"        Perez Leone   2017 9:45 AM   Office Visit   MRN: [id number]    Department:  Endocrinology [facility]   Dept Phone:  [phone]    Description:  Male : 1955   Provider:  Gulshan Mota PA-C           Reason for Visit     Follow-Up           Allergies as of 2017     No Known Allergies      You were diagnosed with     Type 2 diabetes mellitus with hyperlipidemia (CMS-HCC)   [7281067]       Encounter for long-term (current) use of insulin (CMS-HCC)   [V58.67.ICD-9-CM]       Uncontrolled type 2 diabetes mellitus with hyperosmolarity without coma, with long-term current use of insulin (CMS-HCC)   [7616466]       Essential hypertension   [4351448]         Vital Signs     Blood Pressure Pulse Height Weight Body Mass Index Oxygen Saturation    128/76 mmHg 112 1.778 m (5' 10\") 133.811 kg (295 lb) 42.33 kg/m2 92%    Smoking Status                   Never Smoker            Basic Information     Date Of Birth Sex Race Ethnicity Preferred Language    1955 Male White Non- English      Your appointments     Mar 01, 2017  8:10 AM   Established Patient with Gulshan Mota PA-C   [facility] ([facility]    [address], [address]  [facility] [address] [phone]           You will be receiving a confirmation call a few days before your appointment from our automated call confirmation system.            May 08, 2017 11:00 AM   Established Patient with Elle Cooper M.D.   [address] [address] [phone]           You will be receiving a confirmation call a few days before your appointment from our automated call confirmation system.              Problem List              ICD-10-CM Priority Class Noted - Resolved    History of acute anterior wall myocardial infarction I25.2   3/9/2012 - Present    CAD BMS LAD I25.10   3/9/2012 - Present    Obesity E66.9   3/9/2012 - Present   " Heme positive stool R19.5   6/8/2015 - Present    Type II diabetes mellitus, uncontrolled (CMS-HCC) E11.65   12/29/2015 - Present    Dyslipidemia E78.5   7/11/2016 - Present    Essential hypertension I10   7/11/2016 - Present    Encounter for long-term (current) use of insulin (CMS-HCC) Z79.4   2/13/2017 - Present    Type 2 diabetes mellitus with hyperlipidemia (CMS-HCC) E11.69, E78.5   2/13/2017 - Present      Health Maintenance        Date Due Completion Dates    IMM DTaP/Tdap/Td Vaccine (1 - Tdap) 3/27/1974 ---    COLONOSCOPY 3/27/2005 ---    RETINAL SCREENING 2/15/2015 2/15/2014 (Done)    Override on 2/15/2014: Done (told no DM changes.)    IMM ZOSTER VACCINE 3/27/2015 ---    A1C SCREENING 7/9/2017 1/9/2017, 10/3/2016, 7/11/2016, 3/14/2016, 12/29/2015, 6/23/2015, 8/15/2014 (Done), 1/13/2014, 10/19/2011, 9/6/2008    Override on 8/15/2014: Done (10.8% ( done as POC))    DIABETES MONOFILAMENT / LE EXAM 7/11/2017 7/11/2016, 12/29/2015    FASTING LIPID PROFILE 1/9/2018 1/9/2017, 3/14/2016, 6/23/2015, 1/13/2014, 10/23/2012    URINE ACR / MICROALBUMIN 1/9/2018 1/9/2017, 3/14/2016, 6/23/2015, 1/13/2014    SERUM CREATININE 1/9/2018 1/9/2017, 10/3/2016, 3/14/2016, 6/23/2015, 1/13/2014, 9/6/2008            Current Immunizations     Influenza Vaccine Quad Inj (Preserved) 10/24/2016, 9/30/2015  5:18 PM    Pneumococcal polysaccharide vaccine (PPSV-23) 3/30/2016      Below and/or attached are the medications your provider expects you to take. Review all of your home medications and newly ordered medications with your provider and/or pharmacist. Follow medication instructions as directed by your provider and/or pharmacist. Please keep your medication list with you and share with your provider. Update the information when medications are discontinued, doses are changed, or new medications (including over-the-counter products) are added; and carry medication information at all times in the event of emergency situations      Allergies:  No Known Allergies          Medications  Valid as of: February 22, 2017 - 10:10 AM    Generic Name Brand Name Tablet Size Instructions for use    Aspirin (Tablet Delayed Response) ST SANCHEZ ASPIRIN 81 MG QAM.         Atorvastatin Calcium (Tab) LIPITOR 40 MG Take 1 Tab by mouth every evening.        Empagliflozin-Metformin HCl (Tab) SYNJARDY 12.5-1000 MG Take 1 tablet by mouth 2 Times a Day.        Glucose Blood (Strip) FREESTYLE LITE  USE STRIP TO CHECK GLUCOSE TWICE DAILY *E119*        Insulin Glargine (Solution) LANTUS 100 UNIT/ML Inject 30 Units as instructed every evening.        Liraglutide (Solution Pen-injector) VICTOZA 18 MG/3ML Inject 0.3 mL as instructed every day.        Metoprolol Succinate (TABLET SR 24 HR) TOPROL  MG Take 1 Tab by mouth every day.        Moexipril HCl (Tab) UNIVASC 7.5 MG Take 0.5 Tabs by mouth every day.        Needles & Syringes (Misc) Needles & Syringes  Use with lantus insulin 20 units hs.        Niacin (Antihyperlipidemic) (Tab CR) NIASPAN 500 MG Take 1 Tab by mouth 3 times a day.        Nitroglycerin (SL Tab) NITROSTAT 0.4 MG Place 1 Tab under tongue as needed.        .                 Medicines prescribed today were sent to:     [facility] [address] [place] [address] (S), [place] - [address] ([place]) [place] [address]    Phone: [phone] Fax: [phone]    Open 24 Hours?: No      Medication refill instructions:       If your prescription bottle indicates you have medication refills left, it is not necessary to call your provider’s office. Please contact your pharmacy and they will refill your medication.    If your prescription bottle indicates you do not have any refills left, you may request refills at any time through one of the following ways: The online [employer] system (except Urgent Care), by calling your provider’s office, or by asking your pharmacy to contact your provider’s office with a refill request. Medication refills are processed only  during regular business hours and may not be available until the next business day. Your provider may request additional information or to have a follow-up visit with you prior to refilling your medication.   *Please Note: Medication refills are assigned a new Rx number when refilled electronically. Your pharmacy may indicate that no refills were authorized even though a new prescription for the same medication is available at the pharmacy. Please request the medicine by name with the pharmacy before contacting your provider for a refill.        Instructions    Victoza increase to 1.2  Lantus decrease to 10units    Blood glucose log: Check BG in the morning when wake up, before lunch or dinner and before bed.  So three times a day.  Always bring BG diary to the next office visit.     MAKE SURE BRING BG LOG            1j[employer] Access Code: [email or web address]  Expires: 3/1/2017  1:53 PM    1jiajie  A secure, online tool to manage your health information     [employer]’s 1jiajie® is a secure, online tool that connects you to your personalized health information from the privacy of your home -- day or night - making it very easy for you to manage your healthcare. Once the activation process is completed, you can even access your medical information using the 1jiajie savannah, which is available for free in the [employer] Savannah store or Google Play store.     1jiajie provides the following levels of access (as shown below):   My Chart Features   [facility] Doctor [facility]  Specialists [facility]  Urgent  Care Non-[facility]  Primary Care  Doctor   Email your healthcare team securely and privately 24/7 X X X    Manage appointments: schedule your next appointment; view details of past/upcoming appointments X      Request prescription refills. X      View recent personal medical records, including lab and immunizations X X X X   View health record, including health history, allergies, medications X X X X   Read reports about your  outpatient visits, procedures, consult and ER notes X X X X   See your discharge summary, which is a recap of your hospital and/or ER visit that includes your diagnosis, lab results, and care plan. X X       How to register for [employer]:  1. Go to  [email or web address].  2. Click on the Sign Up Now box, which takes you to the New Member Sign Up page. You will need to provide the following information:  a. Enter your [employer] Access Code exactly as it appears at the top of this page. (You will not need to use this code after you’ve completed the sign-up process. If you do not sign up before the expiration date, you must request a new code.)   b. Enter your date of birth.   c. Enter your home email address.   d. Click Submit, and follow the next screen’s instructions.  3. Create a [employer] ID. This will be your [employer] login ID and cannot be changed, so think of one that is secure and easy to remember.  4. Create a [employer] password. You can change your password at any time.  5. Enter your Password Reset Question and Answer. This can be used at a later time if you forget your password.   6. Enter your e-mail address. This allows you to receive e-mail notifications when new information is available in [employer].  7. Click Sign Up. You can now view your health information.    For assistance activating your [employer] account, call [phone]

## 2017-02-22 NOTE — PROGRESS NOTES
Return to office Patient Consult Note  Referred by: Elle Cooper M.D.    Reason for consult: Diabetes Management Type 2    HPI:  Perez Leone is a 61 y.o. old patient who is seeing us today for diabetes care.  This is a pleasant patient with diabetes and I appreciate the opportunity to participate in the care of this patient.    BG Diary: He forgot today.  I explained that I need to see these numbers for me to help him so to always bring the BG diary    Weight: He was 306pounds and now is 295pounds.     Labs of 1/9/2017 he has a Cr. Of 1.1, HbA1c of 8.1, GFR 66, LDL 75, HDL 34,       1. Type 2 diabetes mellitus with hyperlipidemia (CMS-HCC)  I first saw him on 2/13/17 and I  STOPPED   Januvia 100mg    Glyburide 5mg twice a day  Metformin 1000mg twice a day    I STARTED:   Victoza 0.6 injection at night INCREASE the 1.2  Synjardy 12.5/1000 one in AM one in PM  Lantus 20units  REDUCE to 10units at night    2. Encounter for long-term (current) use of insulin (CMS-HCC)  He is on Lantus but hopefully removing next week    3. Uncontrolled type 2 diabetes mellitus with hyperosmolarity without coma, with long-term current use of insulin (CMS-HCC)  He has lost 11 pounds in just 7 days and he states he feels really good.  I am hopeful we will get him off Insulin next visit    4. Essential hypertension  Doing very good.  We might need to decrease meds if he continues to loose weight        ROS:   Constitutional: No night sweats.  Eyes:  No visual changes.  Cardiac: No chest pain, No palpitations or racing heart rate.  Resp: No shortness of breath, No cough,     All other systems were reviewed and were/are negative.  The ROS was revised/revisited during this office visit from the patients first office visit with me on 2.13.17  Please review the full ROS during the first office visit.    Past Medical History:  Patient Active Problem List    Diagnosis Date Noted   • Encounter for long-term (current) use of insulin  (CMS-HCC) 02/13/2017   • Type 2 diabetes mellitus with hyperlipidemia (CMS-HCC) 02/13/2017   • Dyslipidemia 07/11/2016   • Essential hypertension 07/11/2016   • Type II diabetes mellitus, uncontrolled (CMS-HCC) 12/29/2015   • Heme positive stool 06/08/2015   • History of acute anterior wall myocardial infarction 03/09/2012   • CAD BMS LAD 03/09/2012   • Obesity 03/09/2012       Past Surgical History:  History reviewed. No pertinent past surgical history.    Allergies:  Review of patient's allergies indicates no known allergies.    Social History:  Social History     Social History   • Marital Status: Single     Spouse Name: N/A   • Number of Children: N/A   • Years of Education: N/A     Occupational History   • Not on file.     Social History Main Topics   • Smoking status: Never Smoker    • Smokeless tobacco: Never Used   • Alcohol Use: No   • Drug Use: No   • Sexual Activity: No     Other Topics Concern   • Not on file     Social History Narrative    Single    Works at [employer] as a        Family History:  Family History   Problem Relation Age of Onset   • Heart Disease Mother    • Diabetes Brother        Medications:    Current outpatient prescriptions:   •  SYNJARDY 12.5-1000 MG Tab, Take 1 tablet by mouth 2 Times a Day., Disp: 60 Tab, Rfl: 3  •  VICTOZA 18 MG/3ML Solution Pen-injector injection, Inject 0.3 mL as instructed every day., Disp: 3 PEN, Rfl: 6  •  metoprolol SR (TOPROL XL) 100 MG TABLET SR 24 HR, Take 1 Tab by mouth every day., Disp: 90 Tab, Rfl: 3  •  moexipril (UNIVASC) 7.5 MG Tab, Take 0.5 Tabs by mouth every day., Disp: 45 Tab, Rfl: 3  •  insulin glargine (LANTUS) 100 UNIT/ML Solution, Inject 30 Units as instructed every evening., Disp: 10 mL, Rfl: 6  •  FREESTYLE LITE strip, USE STRIP TO CHECK GLUCOSE TWICE DAILY *E119*, Disp: 100 Strip, Rfl: 0  •  nitroglycerin (NITROSTAT) 0.4 MG SL Tab, Place 1 Tab under tongue as needed., Disp: 10 Tab, Rfl: 0  •  Needles &  "Syringes MISC, Use with lantus insulin 20 units hs., Disp: 90 Each, Rfl: 3  •  atorvastatin (LIPITOR) 40 MG TABS, Take 1 Tab by mouth every evening., Disp: 90 Each, Rfl: 1  •  niacin SR (NIASPAN) 500 MG TBCR, Take 1 Tab by mouth 3 times a day., Disp: 90 Each, Rfl: 6  •   LAURA ASPIRIN 81 MG PO TBEC, QAM. , Disp: , Rfl:     Labs:    Physical Examination:   Vital signs: /76 mmHg  Pulse 112  Ht 1.778 m (5' 10\")  Wt 133.811 kg (295 lb)  BMI 42.33 kg/m2  SpO2 92%  General: No distress, cooperative, well dressed and well nourished.   Eyes: No scleral icterus or discharge, No hyposphagma  ENMT: Normal on external inspection of nose, lips, No nasal drainage   Neck: No abnormal masses on inspection  Resp: Normal effort, Bilateral clear to auscultation, No wheezing, No rales  CVS: Regular rate and rhythm, S1 S2 normal, No murmur. No gallop  Extremities: No edema bilateral extremities  Neuro: Alert and oriented  Skin: No rash, No Ulcers  Psych: Normal mood and affect      Assessment and Plan:    1. Type 2 diabetes mellitus with hyperlipidemia (CMS-[facility])  I first saw him on 2/13/17 and I  STOPPED   Januvia 100mg    Glyburide 5mg twice a day  Metformin 1000mg twice a day    I STARTED: on 2/13/17  Victoza 0.6 injection at night INCREASE the 1.2 on 2/22/17  Synjardy 12.5/1000 one in AM one in PM  Lantus 20units  REDUCE to 10units at night on 2/22/17    2. Encounter for long-term (current) use of insulin (CMS-HCC)  He is on Lantus but hopefully removing next week    3. Uncontrolled type 2 diabetes mellitus with hyperosmolarity without coma, with long-term current use of insulin (CMS-[facility])  He has lost 11 pounds in just 7 days and he states he feels really good.  I am hopeful we will get him off Insulin next visit    4. Essential hypertension  Doing very good.  We might need to decrease meds if he continues to loose weight      Return in about 1 week (around 3/1/2017).    Blood glucose log: Check BG in the morning when " wake up, before lunch or dinner and before bed.  So three times a day.  Always bring BG diary to the next office visit.     Thank you kindly for allowing me to participate in the diabetes care plan for this patient.    Gulshan Mota PA-C, BC-[facility]  02/22/2017    CC:   Elle Cooper M.D.

## 2017-02-22 NOTE — PATIENT INSTRUCTIONS
Victoza increase to 1.2  Lantus decrease to 10units    Blood glucose log: Check BG in the morning when wake up, before lunch or dinner and before bed.  So three times a day.  Always bring BG diary to the next office visit.     MAKE SURE BRING BG LOG

## 2017-03-01 ENCOUNTER — OFFICE VISIT (OUTPATIENT)
Dept: ENDOCRINOLOGY | Facility: MEDICAL CENTER | Age: 62
End: 2017-03-01
Payer: MEDICARE

## 2017-03-01 VITALS
HEART RATE: 112 BPM | DIASTOLIC BLOOD PRESSURE: 86 MMHG | WEIGHT: 298 LBS | SYSTOLIC BLOOD PRESSURE: 132 MMHG | BODY MASS INDEX: 42.66 KG/M2 | HEIGHT: 70 IN | OXYGEN SATURATION: 94 %

## 2017-03-01 DIAGNOSIS — E11.69 TYPE 2 DIABETES MELLITUS WITH HYPERLIPIDEMIA (HCC): ICD-10-CM

## 2017-03-01 DIAGNOSIS — Z79.4 ENCOUNTER FOR LONG-TERM (CURRENT) USE OF INSULIN (HCC): ICD-10-CM

## 2017-03-01 DIAGNOSIS — E11.00 UNCONTROLLED TYPE 2 DIABETES MELLITUS WITH HYPEROSMOLARITY WITHOUT COMA, UNSPECIFIED LONG TERM INSULIN USE STATUS: ICD-10-CM

## 2017-03-01 DIAGNOSIS — E78.5 TYPE 2 DIABETES MELLITUS WITH HYPERLIPIDEMIA (HCC): ICD-10-CM

## 2017-03-01 PROCEDURE — 1036F TOBACCO NON-USER: CPT | Performed by: PHYSICIAN ASSISTANT

## 2017-03-01 PROCEDURE — G8482 FLU IMMUNIZE ORDER/ADMIN: HCPCS | Performed by: PHYSICIAN ASSISTANT

## 2017-03-01 PROCEDURE — 3017F COLORECTAL CA SCREEN DOC REV: CPT | Mod: 1P | Performed by: PHYSICIAN ASSISTANT

## 2017-03-01 PROCEDURE — G8598 ASA/ANTIPLAT THER USED: HCPCS | Performed by: PHYSICIAN ASSISTANT

## 2017-03-01 PROCEDURE — 3045F PR MOST RECENT HEMOGLOBIN A1C LEVEL 7.0-9.0%: CPT | Performed by: PHYSICIAN ASSISTANT

## 2017-03-01 PROCEDURE — G8419 CALC BMI OUT NRM PARAM NOF/U: HCPCS | Performed by: PHYSICIAN ASSISTANT

## 2017-03-01 PROCEDURE — 99214 OFFICE O/P EST MOD 30 MIN: CPT | Mod: 25 | Performed by: PHYSICIAN ASSISTANT

## 2017-03-01 RX ORDER — LIRAGLUTIDE 6 MG/ML
1.8 INJECTION SUBCUTANEOUS DAILY
Qty: 3 PEN | Refills: 6 | COMMUNITY
Start: 2017-03-01 | End: 2017-03-22 | Stop reason: SDUPTHER

## 2017-03-01 RX ORDER — EMPAGLIFLOZIN AND METFORMIN HYDROCHLORIDE 12.5; 1 MG/1; MG/1
1 TABLET ORAL 2 TIMES DAILY
Qty: 60 TAB | Refills: 3 | Status: SHIPPED | OUTPATIENT
Start: 2017-03-01 | End: 2017-03-01 | Stop reason: SDUPTHER

## 2017-03-01 RX ORDER — PIOGLITAZONEHYDROCHLORIDE 15 MG/1
15 TABLET ORAL DAILY
Qty: 30 TAB | Refills: 11 | Status: SHIPPED | OUTPATIENT
Start: 2017-03-01 | End: 2017-03-22 | Stop reason: SDUPTHER

## 2017-03-01 RX ORDER — EMPAGLIFLOZIN AND METFORMIN HYDROCHLORIDE 12.5; 1 MG/1; MG/1
1 TABLET ORAL 2 TIMES DAILY
Qty: 60 TAB | Refills: 3 | COMMUNITY
Start: 2017-03-01 | End: 2017-03-22 | Stop reason: SDUPTHER

## 2017-03-01 RX ORDER — LIRAGLUTIDE 6 MG/ML
1.8 INJECTION SUBCUTANEOUS DAILY
Qty: 3 PEN | Refills: 6 | Status: SHIPPED | OUTPATIENT
Start: 2017-03-01 | End: 2017-03-01 | Stop reason: SDUPTHER

## 2017-03-01 NOTE — PROGRESS NOTES
Return to office Patient Consult Note  Referred by: Elle Cooper M.D.    Reason for consult: Diabetes Management Type 2    HPI:  Perez Leone is a 61 y.o. old patient who is seeing us today for diabetes care.  This is a pleasant patient with diabetes and I appreciate the opportunity to participate in the care of this patient.    BG Diary: 3/1/17  In the AM: 145, 165, 147, 161, 154, 152, 157  Before meal: 155, 160, 150, 163, 160, 164  Before Bed:144, 167, 202, 114, 181, 175    Weight: He was on 2/13/17 306pounds and today he is 298    Labs of 1/9/2017 he has a Cr. Of 1.1, HbA1c of 8.1, GFR 66, LDL 75, HDL 34,       1. Uncontrolled type 2 diabetes mellitus with hyperosmolarity without coma, unspecified long term insulin use status (CMS-HCC)  I first saw him on 2/13/17 and I had him  STOP  Januvia 100mg    Glyburide 5mg twice a day  Metformin 1000mg twice a day    I STARTED: on 2/13/17  Victoza 1.2 injection at night   Synjardy 12.5/1000 one in AM one in PM  Lantus 10units  was at 30units    2. Encounter for long-term (current) use of insulin (CMS-HCC)  Currently on lantus but I will stop today    3. Type 2 diabetes mellitus with hyperlipidemia (CMS-HCC)  We will re-check this in labs on next lab draw        ROS:   Constitutional: No night sweats.  Eyes:  No visual changes.  Cardiac: No chest pain, No palpitations or racing heart rate.  Resp: No shortness of breath, No cough,   Neuro: Denies numbness or tinging in bilateral feet or hands, and no loss of sensation.    All other systems were reviewed and were/are negative.  The ROS was revised/revisited during this office visit from the patients first office visit with me on 2/13/17  Please review the full ROS during the first office visit.    Past Medical History:  Patient Active Problem List    Diagnosis Date Noted   • Encounter for long-term (current) use of insulin (CMS-HCC) 02/13/2017   • Type 2 diabetes mellitus with hyperlipidemia (CMS-HCC) 02/13/2017   •  Dyslipidemia 07/11/2016   • Essential hypertension 07/11/2016   • Type II diabetes mellitus, uncontrolled (CMS-[facility]) 12/29/2015   • Heme positive stool 06/08/2015   • History of acute anterior wall myocardial infarction 03/09/2012   • CAD BMS LAD 03/09/2012   • Obesity 03/09/2012       Past Surgical History:  History reviewed. No pertinent past surgical history.    Allergies:  Review of patient's allergies indicates no known allergies.    Social History:  Social History     Social History   • Marital Status: Single     Spouse Name: N/A   • Number of Children: N/A   • Years of Education: N/A     Occupational History   • Not on file.     Social History Main Topics   • Smoking status: Never Smoker    • Smokeless tobacco: Never Used   • Alcohol Use: No   • Drug Use: No   • Sexual Activity: No     Other Topics Concern   • Not on file     Social History Narrative    Single    Works at [employer] as a        Family History:  Family History   Problem Relation Age of Onset   • Heart Disease Mother    • Diabetes Brother        Medications:    Current outpatient prescriptions:   •  SYNJARDY 12.5-1000 MG Tab, Take 1 tablet by mouth 2 Times a Day., Disp: 60 Tab, Rfl: 3  •  VICTOZA 18 MG/3ML Solution Pen-injector injection, Inject 0.3 mL as instructed every day., Disp: 3 PEN, Rfl: 6  •  pioglitazone (ACTOS) 15 MG Tab, Take 1 Tab by mouth every day for 30 days., Disp: 30 Tab, Rfl: 11  •  metoprolol SR (TOPROL XL) 100 MG TABLET SR 24 HR, Take 1 Tab by mouth every day., Disp: 90 Tab, Rfl: 3  •  moexipril (UNIVASC) 7.5 MG Tab, Take 0.5 Tabs by mouth every day., Disp: 45 Tab, Rfl: 3  •  FREESTYLE LITE strip, USE STRIP TO CHECK GLUCOSE TWICE DAILY *E119*, Disp: 100 Strip, Rfl: 0  •  nitroglycerin (NITROSTAT) 0.4 MG SL Tab, Place 1 Tab under tongue as needed., Disp: 10 Tab, Rfl: 0  •  Needles & Syringes MISC, Use with lantus insulin 20 units hs., Disp: 90 Each, Rfl: 3  •  atorvastatin (LIPITOR) 40 MG TABS,  "Take 1 Tab by mouth every evening., Disp: 90 Each, Rfl: 1  •  niacin SR (NIASPAN) 500 MG TBCR, Take 1 Tab by mouth 3 times a day., Disp: 90 Each, Rfl: 6  •  ST SANCHEZ ASPIRIN 81 MG PO TBEC, QAM. , Disp: , Rfl:     Labs:    Physical Examination:   Vital signs: /86 mmHg  Pulse 112  Ht 1.778 m (5' 10\")  Wt 135.172 kg (298 lb)  BMI 42.76 kg/m2  SpO2 94%  General: No distress, cooperative, well dressed and well nourished.   Eyes: No scleral icterus or discharge, No hyposphagma  ENMT: Normal on external inspection of nose, lips, No nasal drainage   Neck: No abnormal masses on inspection  Resp: Normal effort, Bilateral clear to auscultation, No wheezing, No rales  CVS: Regular rate and rhythm, S1 S2 normal, No murmur. No gallop  Extremities: No edema bilateral extremities  Neuro: Alert and oriented  Skin: No rash, No Ulcers  Psych: Normal mood and affect      Assessment and Plan:    1. Uncontrolled type 2 diabetes mellitus with hyperosmolarity without coma, unspecified long term insulin use status (CMS-HCC)  I first saw him on 2/13/17 and I had him  STOP  Januvia 100mg    Glyburide 5mg twice a day  Metformin 1000mg twice a day    I STARTED: on 2/13/17  Victoza 1.2 injection at night INCREASE to 1.8  Synjardy 12.5/1000 one in AM one in PM  Lantus 10units  was at 30units STOP  Actos 15mg qday start on 3/1/17      2. Encounter for long-term (current) use of insulin (CMS-HCC)  I stopped Lantus today.  He is no longer on Insulin and his Diabetes numbers are better and he is loosing weight.     3. Type 2 diabetes mellitus with hyperlipidemia (CMS-[facility])  This should start to get georgina as his BG comes down.        Return in about 3 weeks (around 3/22/2017).    Blood glucose log: Check BG in the morning when wake up, before lunch or dinner and before bed.  So three times a day.  Always bring BG diary to the next office visit.       Thank you kindly for allowing me to participate in the diabetes care plan for this " patient.    Gulshan Mota PA-C, BC-ADM  03/01/2017    CC:   Elle Cooper M.D.

## 2017-03-01 NOTE — PATIENT INSTRUCTIONS
Victoza 1.2 injection at night INCREASE to 1.8  Synjardy 12.5/1000 one in AM one in PM  Lantus 10units  was at 30units STOP THIS Medication  Actos 15mg qday start on 3/1/17 ( at pharmacy)    Blood glucose log: Check BG in the morning when wake up, before lunch or dinner and before bed.  So three times a day.  Always bring BG diary to the next office visit.

## 2017-03-01 NOTE — MR AVS SNAPSHOT
"        Perez Leone   3/1/2017 8:10 AM   Office Visit   MRN: [id number]    Department:  Endocrinology [facility]   Dept Phone:  [phone]    Description:  Male : 1955   Provider:  Gulshan Mota PA-C           Reason for Visit     Follow-Up           Allergies as of 3/1/2017     No Known Allergies      You were diagnosed with     Uncontrolled type 2 diabetes mellitus with hyperosmolarity without coma, unspecified long term insulin use status (CMS-HCC)   [2279382]       Encounter for long-term (current) use of insulin (CMS-HCC)   [V58.67.ICD-9-CM]       Type 2 diabetes mellitus with hyperlipidemia (CMS-HCC)   [7819898]         Vital Signs     Blood Pressure Pulse Height Weight Body Mass Index Oxygen Saturation    132/86 mmHg 112 1.778 m (5' 10\") 135.172 kg (298 lb) 42.76 kg/m2 94%    Smoking Status                   Never Smoker            Basic Information     Date Of Birth Sex Race Ethnicity Preferred Language    1955 Male White Non- English      Your appointments     Mar 22, 2017  9:30 AM   Established Patient with Gulshan Mota PA-C   [facility] Endocrin[facility]    [address]  [facility] [address] [phone]           You will be receiving a confirmation call a few days before your appointment from our automated call confirmation system.            May 08, 2017 11:00 AM   Established Patient with Elle Cooper M.D.   [address] [address] [phone]           You will be receiving a confirmation call a few days before your appointment from our automated call confirmation system.              Problem List              ICD-10-CM Priority Class Noted - Resolved    History of acute anterior wall myocardial infarction I25.2   3/9/2012 - Present    CAD BMS LAD I25.10   3/9/2012 - Present    Obesity E66.9   3/9/2012 - Present    Heme positive stool R19.5   2015 - " Present    Type II diabetes mellitus, uncontrolled (CMS-HCC) E11.65   12/29/2015 - Present    Dyslipidemia E78.5   7/11/2016 - Present    Essential hypertension I10   7/11/2016 - Present    Encounter for long-term (current) use of insulin (CMS-HCC) Z79.4   2/13/2017 - Present    Type 2 diabetes mellitus with hyperlipidemia (CMS-HCC) E11.69, E78.5   2/13/2017 - Present      Health Maintenance        Date Due Completion Dates    IMM DTaP/Tdap/Td Vaccine (1 - Tdap) 3/27/1974 ---    COLONOSCOPY 3/27/2005 ---    RETINAL SCREENING 2/15/2015 2/15/2014 (Done)    Override on 2/15/2014: Done (told no DM changes.)    IMM ZOSTER VACCINE 3/27/2015 ---    A1C SCREENING 7/9/2017 1/9/2017, 10/3/2016, 7/11/2016, 3/14/2016, 12/29/2015, 6/23/2015, 8/15/2014 (Done), 1/13/2014, 10/19/2011, 9/6/2008    Override on 8/15/2014: Done (10.8% ( done as POC))    DIABETES MONOFILAMENT / LE EXAM 7/11/2017 7/11/2016, 12/29/2015    FASTING LIPID PROFILE 1/9/2018 1/9/2017, 3/14/2016, 6/23/2015, 1/13/2014, 10/23/2012    URINE ACR / MICROALBUMIN 1/9/2018 1/9/2017, 3/14/2016, 6/23/2015, 1/13/2014    SERUM CREATININE 1/9/2018 1/9/2017, 10/3/2016, 3/14/2016, 6/23/2015, 1/13/2014, 9/6/2008            Current Immunizations     Influenza Vaccine Quad Inj (Preserved) 10/24/2016, 9/30/2015  5:18 PM    Pneumococcal polysaccharide vaccine (PPSV-23) 3/30/2016      Below and/or attached are the medications your provider expects you to take. Review all of your home medications and newly ordered medications with your provider and/or pharmacist. Follow medication instructions as directed by your provider and/or pharmacist. Please keep your medication list with you and share with your provider. Update the information when medications are discontinued, doses are changed, or new medications (including over-the-counter products) are added; and carry medication information at all times in the event of emergency situations     Allergies:  No Known Allergies             Medications  Valid as of: March 01, 2017 -  8:12 AM    Generic Name Brand Name Tablet Size Instructions for use    Aspirin (Tablet Delayed Response) ST SANCHEZ ASPIRIN 81 MG QAM.         Atorvastatin Calcium (Tab) LIPITOR 40 MG Take 1 Tab by mouth every evening.        Empagliflozin-Metformin HCl (Tab) SYNJARDY 12.5-1000 MG Take 1 tablet by mouth 2 Times a Day.        Glucose Blood (Strip) FREESTYLE LITE  USE STRIP TO CHECK GLUCOSE TWICE DAILY *E119*        Liraglutide (Solution Pen-injector) VICTOZA 18 MG/3ML Inject 0.3 mL as instructed every day.        Metoprolol Succinate (TABLET SR 24 HR) TOPROL  MG Take 1 Tab by mouth every day.        Moexipril HCl (Tab) UNIVASC 7.5 MG Take 0.5 Tabs by mouth every day.        Needles & Syringes (Misc) Needles & Syringes  Use with lantus insulin 20 units hs.        Niacin (Antihyperlipidemic) (Tab CR) NIASPAN 500 MG Take 1 Tab by mouth 3 times a day.        Nitroglycerin (SL Tab) NITROSTAT 0.4 MG Place 1 Tab under tongue as needed.        Pioglitazone HCl (Tab) ACTOS 15 MG Take 1 Tab by mouth every day for 30 days.        .                 Medicines prescribed today were sent to:     [facility] [place] - [address] [id number] [facility] [place] [address]    Phone: [phone] Fax: [phone]    Open 24 Hours?: No      Medication refill instructions:       If your prescription bottle indicates you have medication refills left, it is not necessary to call your provider’s office. Please contact your pharmacy and they will refill your medication.    If your prescription bottle indicates you do not have any refills left, you may request refills at any time through one of the following ways: The online Blockboard system (except Urgent Care), by calling your provider’s office, or by asking your pharmacy to contact your provider’s office with a refill request. Medication refills are processed only during regular business hours and may not be available  until the next business day. Your provider may request additional information or to have a follow-up visit with you prior to refilling your medication.   *Please Note: Medication refills are assigned a new Rx number when refilled electronically. Your pharmacy may indicate that no refills were authorized even though a new prescription for the same medication is available at the pharmacy. Please request the medicine by name with the pharmacy before contacting your provider for a refill.        Instructions    Victoza 1.2 injection at night INCREASE to 1.8  Synjardy 12.5/1000 one in AM one in PM  Lantus 10units  was at 30units STOP THIS Medication  Actos 15mg qday start on 3/1/17 ( at pharmacy)    Blood glucose log: Check BG in the morning when wake up, before lunch or dinner and before bed.  So three times a day.  Always bring BG diary to the next office visit.             ShelfFlip Access Code: [email or web address]  Expires: 3/1/2017  1:53 PM    ShelfFlip  A secure, online tool to manage your health information     [employer]’s ShelfFlip® is a secure, online tool that connects you to your personalized health information from the privacy of your home -- day or night - making it very easy for you to manage your healthcare. Once the activation process is completed, you can even access your medical information using the ShelfFlip savannah, which is available for free in the Apple Savannah store or Google Play store.     ShelfFlip provides the following levels of access (as shown below):   My Chart Features   [facility] Doctor [facility]  Specialists [facility]  Urgent  Care Non-[facility]  Primary Care  Doctor   Email your healthcare team securely and privately 24/7 X X X    Manage appointments: schedule your next appointment; view details of past/upcoming appointments X      Request prescription refills. X      View recent personal medical records, including lab and immunizations X X X X   View health record, including health history,  allergies, medications X X X X   Read reports about your outpatient visits, procedures, consult and ER notes X X X X   See your discharge summary, which is a recap of your hospital and/or ER visit that includes your diagnosis, lab results, and care plan. X X       How to register for [employer]:  1. Go to  [email or web address].  2. Click on the Sign Up Now box, which takes you to the New Member Sign Up page. You will need to provide the following information:  a. Enter your [employer] Access Code exactly as it appears at the top of this page. (You will not need to use this code after you’ve completed the sign-up process. If you do not sign up before the expiration date, you must request a new code.)   b. Enter your date of birth.   c. Enter your home email address.   d. Click Submit, and follow the next screen’s instructions.  3. Create a [employer]ery ID. This will be your [employer] login ID and cannot be changed, so think of one that is secure and easy to remember.  4. Create a [employer] password. You can change your password at any time.  5. Enter your Password Reset Question and Answer. This can be used at a later time if you forget your password.   6. Enter your e-mail address. This allows you to receive e-mail notifications when new information is available in The [employer]ery.  7. Click Sign Up. You can now view your health information.    For assistance activating your [employer] account, call [phone]

## 2017-03-03 DIAGNOSIS — E11.00 UNCONTROLLED TYPE 2 DIABETES MELLITUS WITH HYPEROSMOLARITY WITHOUT COMA, UNSPECIFIED LONG TERM INSULIN USE STATUS: ICD-10-CM

## 2017-03-03 RX ORDER — LANCETS 28 GAUGE
EACH MISCELLANEOUS
Qty: 100 EACH | Refills: 3 | Status: SHIPPED
Start: 2017-03-03 | End: 2017-10-08 | Stop reason: SDUPTHER

## 2017-03-03 RX ORDER — LANCETS 28 GAUGE
EACH MISCELLANEOUS
Qty: 100 EACH | Refills: 3 | Status: SHIPPED
Start: 2017-03-03 | End: 2017-03-03 | Stop reason: SDUPTHER

## 2017-03-03 NOTE — TELEPHONE ENCOUNTER
Was the patient seen in the last year in this department? Yes 1/30/2017    Does patient have an active prescription for medications requested? Yes     Received Request Via: Pharmacy

## 2017-03-08 ENCOUNTER — PATIENT OUTREACH (OUTPATIENT)
Dept: HEALTH INFORMATION MANAGEMENT | Facility: OTHER | Age: 62
End: 2017-03-08

## 2017-03-09 NOTE — PROGRESS NOTES
Attempt #:1    Verify PCP: yes    Communication Preference Obtained: yes     Annual Wellness Visit Scheduling  1. Scheduling Status:Scheduled       Care Gap Scheduling (Attempt to Schedule EACH Overdue Care Gap!)     Health Maintenance Due   Topic Date Due   • Annual Wellness Visit  SCHEDULED    • IMM DTaP/Tdap/Td Vaccine (1 - Tdap) 03/27/1974   • COLONOSCOPY  03/27/2005   • RETINAL SCREENING  02/15/2015   • IMM ZOSTER VACCINE  03/27/2015         PathJump Activation: declined  PathJump Savannah: no  Virtual Visits: no  Opt In to Text Messages: no

## 2017-03-19 DIAGNOSIS — E11.00 UNCONTROLLED TYPE 2 DIABETES MELLITUS WITH HYPEROSMOLARITY WITHOUT COMA, UNSPECIFIED LONG TERM INSULIN USE STATUS: ICD-10-CM

## 2017-03-20 RX ORDER — BLOOD-GLUCOSE METER
KIT MISCELLANEOUS
Qty: 200 STRIP | Refills: 3 | Status: SHIPPED | OUTPATIENT
Start: 2017-03-20 | End: 2017-03-20 | Stop reason: SDUPTHER

## 2017-03-22 ENCOUNTER — OFFICE VISIT (OUTPATIENT)
Dept: ENDOCRINOLOGY | Facility: MEDICAL CENTER | Age: 62
End: 2017-03-22
Payer: MEDICARE

## 2017-03-22 VITALS
BODY MASS INDEX: 41.52 KG/M2 | HEIGHT: 70 IN | HEART RATE: 110 BPM | DIASTOLIC BLOOD PRESSURE: 88 MMHG | SYSTOLIC BLOOD PRESSURE: 120 MMHG | WEIGHT: 290 LBS | OXYGEN SATURATION: 95 %

## 2017-03-22 DIAGNOSIS — Z79.4 ENCOUNTER FOR LONG-TERM (CURRENT) USE OF INSULIN (HCC): ICD-10-CM

## 2017-03-22 DIAGNOSIS — E11.69 TYPE 2 DIABETES MELLITUS WITH HYPERLIPIDEMIA (HCC): ICD-10-CM

## 2017-03-22 DIAGNOSIS — E78.5 TYPE 2 DIABETES MELLITUS WITH HYPERLIPIDEMIA (HCC): ICD-10-CM

## 2017-03-22 PROCEDURE — G8482 FLU IMMUNIZE ORDER/ADMIN: HCPCS | Performed by: PHYSICIAN ASSISTANT

## 2017-03-22 PROCEDURE — G8419 CALC BMI OUT NRM PARAM NOF/U: HCPCS | Performed by: PHYSICIAN ASSISTANT

## 2017-03-22 PROCEDURE — 1036F TOBACCO NON-USER: CPT | Performed by: PHYSICIAN ASSISTANT

## 2017-03-22 PROCEDURE — 3046F HEMOGLOBIN A1C LEVEL >9.0%: CPT | Mod: 8P | Performed by: PHYSICIAN ASSISTANT

## 2017-03-22 PROCEDURE — 99214 OFFICE O/P EST MOD 30 MIN: CPT | Performed by: PHYSICIAN ASSISTANT

## 2017-03-22 PROCEDURE — G8598 ASA/ANTIPLAT THER USED: HCPCS | Performed by: PHYSICIAN ASSISTANT

## 2017-03-22 RX ORDER — EMPAGLIFLOZIN AND METFORMIN HYDROCHLORIDE 12.5; 1 MG/1; MG/1
1 TABLET ORAL 2 TIMES DAILY
Qty: 60 TAB | Refills: 3 | Status: SHIPPED | OUTPATIENT
Start: 2017-03-22 | End: 2017-05-08

## 2017-03-22 RX ORDER — PIOGLITAZONEHYDROCHLORIDE 15 MG/1
15 TABLET ORAL DAILY
Qty: 30 TAB | Refills: 11 | Status: SHIPPED | OUTPATIENT
Start: 2017-03-22 | End: 2017-04-04

## 2017-03-22 RX ORDER — LIRAGLUTIDE 6 MG/ML
1.8 INJECTION SUBCUTANEOUS DAILY
Qty: 3 PEN | Refills: 6 | Status: SHIPPED | OUTPATIENT
Start: 2017-03-22 | End: 2017-06-26 | Stop reason: SDUPTHER

## 2017-03-22 NOTE — MR AVS SNAPSHOT
"        Perez Leone   3/22/2017 9:30 AM   Office Visit   MRN: [id number]    Department:  Endocrinology [facility]   Dept Phone:  [phone]    Description:  Male : 1955   Provider:  Gulshan Mota PA-C           Reason for Visit     Follow-Up           Allergies as of 3/22/2017     No Known Allergies      You were diagnosed with     Type 2 diabetes mellitus with hyperlipidemia (CMS-HCC)   [7184352]       Encounter for long-term (current) use of insulin (CMS-HCC)   [V58.67.ICD-9-CM]         Vital Signs     Blood Pressure Pulse Height Weight Body Mass Index Oxygen Saturation    120/88 mmHg 110 1.778 m (5' 10\") 131.543 kg (290 lb) 41.61 kg/m2 95%    Smoking Status                   Never Smoker            Basic Information     Date Of Birth Sex Race Ethnicity Preferred Language    1955 Male White Non- English      Your appointments     Mar 22, 2017  9:30 AM   Established Patient with Gulshan Mota PA-C   [facility] Endocrinology [facility]    [address]  [facility] [address] [phone]           You will be receiving a confirmation call a few days before your appointment from our automated call confirmation system.            2017  8:20 AM   ANNUAL EXAM PREVENTATIVE with CYNTHIA Mendosa   [facility]    [address]  [facility] [address] [phone]            May 08, 2017 11:00 AM   Established Patient with Elle Cooper M.D.   [address] [address] [phone]           You will be receiving a confirmation call a few days before your appointment from our automated call confirmation system.            2017 10:40 AM   Established Patient with Gulshan Mota PA-C   [facility] & Endocrinology ([facility])    [address]  [facility] [address] [phone]          " You will be receiving a confirmation call a few days before your appointment from our automated call confirmation system.              Problem List              ICD-10-CM Priority Class Noted - Resolved    History of acute anterior wall myocardial infarction I25.2   3/9/2012 - Present    CAD BMS LAD I25.10   3/9/2012 - Present    Obesity E66.9   3/9/2012 - Present    Heme positive stool R19.5   6/8/2015 - Present    Type II diabetes mellitus, uncontrolled (CMS-HCC) E11.65   12/29/2015 - Present    Dyslipidemia E78.5   7/11/2016 - Present    Essential hypertension I10   7/11/2016 - Present    Encounter for long-term (current) use of insulin (CMS-HCC) Z79.4   2/13/2017 - Present    Type 2 diabetes mellitus with hyperlipidemia (CMS-HCC) E11.69, E78.5   2/13/2017 - Present      Health Maintenance        Date Due Completion Dates    IMM DTaP/Tdap/Td Vaccine (1 - Tdap) 3/27/1974 ---    COLONOSCOPY 3/27/2005 ---    RETINAL SCREENING 2/15/2015 2/15/2014 (Done)    Override on 2/15/2014: Done (told no DM changes.)    IMM ZOSTER VACCINE 3/27/2015 ---    A1C SCREENING 7/9/2017 1/9/2017, 10/3/2016, 7/11/2016, 3/14/2016, 12/29/2015, 6/23/2015, 8/15/2014 (Done), 1/13/2014, 10/19/2011, 9/6/2008    Override on 8/15/2014: Done (10.8% ( done as POC))    DIABETES MONOFILAMENT / LE EXAM 7/11/2017 7/11/2016, 12/29/2015    FASTING LIPID PROFILE 1/9/2018 1/9/2017, 3/14/2016, 6/23/2015, 1/13/2014, 10/23/2012    URINE ACR / MICROALBUMIN 1/9/2018 1/9/2017, 3/14/2016, 6/23/2015, 1/13/2014    SERUM CREATININE 1/9/2018 1/9/2017, 10/3/2016, 3/14/2016, 6/23/2015, 1/13/2014, 9/6/2008            Current Immunizations     Influenza Vaccine Quad Inj (Preserved) 10/24/2016, 9/30/2015  5:18 PM    Pneumococcal polysaccharide vaccine (PPSV-23) 3/30/2016      Below and/or attached are the medications your provider expects you to take. Review all of your home medications and newly ordered medications with your provider and/or pharmacist. Follow medication  instructions as directed by your provider and/or pharmacist. Please keep your medication list with you and share with your provider. Update the information when medications are discontinued, doses are changed, or new medications (including over-the-counter products) are added; and carry medication information at all times in the event of emergency situations     Allergies:  No Known Allergies          Medications  Valid as of: March 22, 2017 -  9:29 AM    Generic Name Brand Name Tablet Size Instructions for use    Aspirin (Tablet Delayed Response) ST SANCHEZ ASPIRIN 81 MG QAM.         Atorvastatin Calcium (Tab) LIPITOR 40 MG Take 1 Tab by mouth every evening.        Empagliflozin-Metformin HCl (Tab) SYNJARDY 12.5-1000 MG Take 1 tablet by mouth 2 Times a Day.        Glucose Blood (Strip) glucose blood  Use one strip to check blood sugar twice a day as needed.        Insulin Pen Needle (Misc) Insulin Pen Needle 32G X 4 MM 1 Applicator by Does not apply route every day. Using one needle tip with victoza per day        Lancets (Misc) FREESTYLE LANCETS  Use one to check glucose twice a day        Liraglutide (Solution Pen-injector) VICTOZA 18 MG/3ML Inject 0.3 mL as instructed every day.        Metoprolol Succinate (TABLET SR 24 HR) TOPROL  MG Take 1 Tab by mouth every day.        Moexipril HCl (Tab) UNIVASC 7.5 MG Take 0.5 Tabs by mouth every day.        Needles & Syringes (Misc) Needles & Syringes  Use with lantus insulin 20 units hs.        Niacin (Antihyperlipidemic) (Tab CR) NIASPAN 500 MG Take 1 Tab by mouth 3 times a day.        Nitroglycerin (SL Tab) NITROSTAT 0.4 MG Place 1 Tab under tongue as needed.        Pioglitazone HCl (Tab) ACTOS 15 MG Take 1 Tab by mouth every day for 30 days.        .                 Medicines prescribed today were sent to:     [facility] [place] - [address] [facility] [place]    [facility] [facility] [place] [address]    Phone: [phone] Fax: [phone]    Open 24 Hours?:  No      Medication refill instructions:       If your prescription bottle indicates you have medication refills left, it is not necessary to call your provider’s office. Please contact your pharmacy and they will refill your medication.    If your prescription bottle indicates you do not have any refills left, you may request refills at any time through one of the following ways: The online Blue Nile system (except Urgent Care), by calling your provider’s office, or by asking your pharmacy to contact your provider’s office with a refill request. Medication refills are processed only during regular business hours and may not be available until the next business day. Your provider may request additional information or to have a follow-up visit with you prior to refilling your medication.   *Please Note: Medication refills are assigned a new Rx number when refilled electronically. Your pharmacy may indicate that no refills were authorized even though a new prescription for the same medication is available at the pharmacy. Please request the medicine by name with the pharmacy before contacting your provider for a refill.           LOOKKhart Status: Patient Declined

## 2017-03-22 NOTE — PROGRESS NOTES
Return to office Patient Consult Note  Referred by: Elle Cooper M.D.    Reason for consult: Diabetes Management Type 2    HPI:  Perez Leone is a 61 y.o. old patient who is seeing us today for diabetes care.  This is a pleasant patient with diabetes and I appreciate the opportunity to participate in the care of this patient.    BG Diary: 3/22/17  In the AM: 131, 136, 152, 131, 112, 130, 136, 119, 128  Before meal:  Before Bed: 143, 145, 106, 134, 125, 136, 130, 125    BG Diary: 3/1/17  In the AM: 145, 165, 147, 161, 154, 152, 157  Before meal: 155, 160, 150, 163, 160, 164  Before Bed:144, 167, 202, 114, 181, 175    Weight: He was on 2/13/17 306pounds and today he is 290    Labs of 1/9/2017 he has a Cr. Of 1.1, HbA1c of 8.1, GFR 66, LDL 75, HDL 34,     He had previously seen Rubén Ambrosio as his Endo      1. Type 2 diabetes mellitus with hyperlipidemia (CMS-HCC)  I first saw him on 2/13/17 and I had him  STOP  Januvia 100mg    Glyburide 5mg twice a day  Metformin 1000mg twice a day  Lantus 30units QHS    I STARTED: on 2/13/17  Victoza 1.8 injection at night   Synjardy 12.5/1000 one in AM one in PM  Lantus has been  STOPPED  Actos 15mg qday start on 3/1/17    2. Encounter for long-term (current) use of insulin (CMS-HCC)  Has now been off Lantus for a week and his numbers are very good  He has lost a total of 16 pounds in just a month      ROS:   Constitutional: No night sweats.  Eyes:  No visual changes.  Cardiac: No chest pain, No palpitations or racing heart rate.  Resp: No shortness of breath, No cough,   Gi: No Diarrhea    All other systems were reviewed and were/are negative.  The ROS was revised/revisited during this office visit from the patients first office visit with me on 2/13/17 Please review the full ROS during the first office visit.    Past Medical History:  Patient Active Problem List    Diagnosis Date Noted   • Encounter for long-term (current) use of insulin (CMS-[facility]) 02/13/2017   • Type 2  diabetes mellitus with hyperlipidemia (CMS-HCC) 02/13/2017   • Dyslipidemia 07/11/2016   • Essential hypertension 07/11/2016   • Type II diabetes mellitus, uncontrolled (CMS-HCC) 12/29/2015   • Heme positive stool 06/08/2015   • History of acute anterior wall myocardial infarction 03/09/2012   • CAD BMS LAD 03/09/2012   • Obesity 03/09/2012       Past Surgical History:  History reviewed. No pertinent past surgical history.    Allergies:  Review of patient's allergies indicates no known allergies.    Social History:  Social History     Social History   • Marital Status: Single     Spouse Name: N/A   • Number of Children: N/A   • Years of Education: N/A     Occupational History   • Not on file.     Social History Main Topics   • Smoking status: Never Smoker    • Smokeless tobacco: Never Used   • Alcohol Use: No   • Drug Use: No   • Sexual Activity: No     Other Topics Concern   • Not on file     Social History Narrative    Single    Works at [employer] as a        Family History:  Family History   Problem Relation Age of Onset   • Heart Disease Mother    • Diabetes Brother        Medications:    Current outpatient prescriptions:   •  glucose blood (FREESTYLE LITE) strip, Use one strip to check blood sugar twice a day as needed., Disp: 200 Strip, Rfl: 3  •  FREESTYLE LANCETS Misc, Use one to check glucose twice a day, Disp: 100 Each, Rfl: 3  •  SYNJARDY 12.5-1000 MG Tab, Take 1 tablet by mouth 2 Times a Day., Disp: 60 Tab, Rfl: 3  •  VICTOZA 18 MG/3ML Solution Pen-injector injection, Inject 0.3 mL as instructed every day., Disp: 3 PEN, Rfl: 6  •  pioglitazone (ACTOS) 15 MG Tab, Take 1 Tab by mouth every day for 30 days., Disp: 30 Tab, Rfl: 11  •  metoprolol SR (TOPROL XL) 100 MG TABLET SR 24 HR, Take 1 Tab by mouth every day., Disp: 90 Tab, Rfl: 3  •  moexipril (UNIVASC) 7.5 MG Tab, Take 0.5 Tabs by mouth every day., Disp: 45 Tab, Rfl: 3  •  nitroglycerin (NITROSTAT) 0.4 MG SL Tab, Place 1  "Tab under tongue as needed., Disp: 10 Tab, Rfl: 0  •  Needles & Syringes MISC, Use with lantus insulin 20 units hs., Disp: 90 Each, Rfl: 3  •  atorvastatin (LIPITOR) 40 MG TABS, Take 1 Tab by mouth every evening., Disp: 90 Each, Rfl: 1  •  niacin SR (NIASPAN) 500 MG TBCR, Take 1 Tab by mouth 3 times a day., Disp: 90 Each, Rfl: 6  •  [facility] ASPIRIN 81 MG PO TBEC, QAM. , Disp: , Rfl:         Physical Examination:   Vital signs: /88 mmHg  Pulse 110  Ht 1.778 m (5' 10\")  Wt 131.543 kg (290 lb)  BMI 41.61 kg/m2  SpO2 95%  General: No distress, cooperative, well dressed and well nourished.   Eyes: No scleral icterus or discharge, No hyposphagma  ENMT: Normal on external inspection of nose, lips, No nasal drainage   Neck: No abnormal masses on inspection  Resp: Normal effort, Bilateral clear to auscultation, No wheezing, No rales  CVS: Regular rate and rhythm, S1 S2 normal, No murmur. No gallop  Extremities: No edema bilateral extremities  Neuro: Alert and oriented  Skin: No rash, No Ulcers  Psych: Normal mood and affect      Assessment and Plan:    1. Type 2 diabetes mellitus with hyperlipidemia (CMS-HCC)  I STARTED: on 2/13/17  Victoza 1.8 injection at night   Synjardy 12.5/1000 one in AM one in PM  Lantus has been  STOPPED  Actos 15mg qday start on 3/1/17    He is doing very well,  He is off insulin has lost 16 pounds in one month his BG numbers are perfect.  I will see him one more time in 3 months and if looking good at that visit he can return to his PCP who can refill his meds for him and manage his Diabetes.     His BP is also becoming better and we will have to consider decreasing BP meds.       2. Encounter for long-term (current) use of insulin (CMS-[facility])  Has now been off Lantus for a week and his numbers are verygood  He has lost a total of 16 pounds in just a month      Return in about 3 months (around 6/22/2017).    Blood glucose log: Check BG in the morning when wake up, before lunch or dinner " and before bed.  So three times a day.  Always bring BG diary to the next office visit.       Thank you kindly for allowing me to participate in the diabetes care plan for this patient.    Vish Mota PA-C, BC-ADM  03/22/2017    CC:   Elle Cooper M.D.

## 2017-04-04 ENCOUNTER — OFFICE VISIT (OUTPATIENT)
Dept: MEDICAL GROUP | Facility: MEDICAL CENTER | Age: 62
End: 2017-04-04
Payer: MEDICARE

## 2017-04-04 VITALS
WEIGHT: 285 LBS | HEART RATE: 94 BPM | DIASTOLIC BLOOD PRESSURE: 82 MMHG | HEIGHT: 72 IN | OXYGEN SATURATION: 96 % | SYSTOLIC BLOOD PRESSURE: 142 MMHG | TEMPERATURE: 97.9 F | BODY MASS INDEX: 38.6 KG/M2

## 2017-04-04 DIAGNOSIS — E78.5 DYSLIPIDEMIA: ICD-10-CM

## 2017-04-04 DIAGNOSIS — I25.2 HISTORY OF ACUTE ANTERIOR WALL MYOCARDIAL INFARCTION: ICD-10-CM

## 2017-04-04 DIAGNOSIS — I10 ESSENTIAL HYPERTENSION: ICD-10-CM

## 2017-04-04 DIAGNOSIS — E11.69 TYPE 2 DIABETES MELLITUS WITH HYPERLIPIDEMIA (HCC): ICD-10-CM

## 2017-04-04 DIAGNOSIS — Z79.4 ENCOUNTER FOR LONG-TERM (CURRENT) USE OF INSULIN (HCC): ICD-10-CM

## 2017-04-04 DIAGNOSIS — R19.5 HEME POSITIVE STOOL: ICD-10-CM

## 2017-04-04 DIAGNOSIS — Z00.00 MEDICARE ANNUAL WELLNESS VISIT, INITIAL: Primary | ICD-10-CM

## 2017-04-04 DIAGNOSIS — Z12.11 ENCOUNTER FOR SCREENING COLONOSCOPY: ICD-10-CM

## 2017-04-04 DIAGNOSIS — Z12.11 SCREEN FOR COLON CANCER: ICD-10-CM

## 2017-04-04 DIAGNOSIS — E78.5 TYPE 2 DIABETES MELLITUS WITH HYPERLIPIDEMIA (HCC): ICD-10-CM

## 2017-04-04 PROCEDURE — 1036F TOBACCO NON-USER: CPT | Performed by: NURSE PRACTITIONER

## 2017-04-04 PROCEDURE — G0438 PPPS, INITIAL VISIT: HCPCS | Performed by: NURSE PRACTITIONER

## 2017-04-04 PROCEDURE — 3045F PR MOST RECENT HEMOGLOBIN A1C LEVEL 7.0-9.0%: CPT | Performed by: NURSE PRACTITIONER

## 2017-04-04 PROCEDURE — G8510 SCR DEP NEG, NO PLAN REQD: HCPCS | Performed by: NURSE PRACTITIONER

## 2017-04-04 ASSESSMENT — PATIENT HEALTH QUESTIONNAIRE - PHQ9: CLINICAL INTERPRETATION OF PHQ2 SCORE: 0

## 2017-04-04 NOTE — PATIENT INSTRUCTIONS
Continue with care through Elle Cooper M.D..  Next Medicare Annual Wellness Visit is due in one year.    Continue care with specialists you are seeing for your chronic problems.    Attached is some preventative information for you to read.          Fall Prevention and Home Safety  Falls cause injuries and can affect all age groups. It is possible to prevent falls.   HOW TO PREVENT FALLS  · Wear shoes with rubber soles that do not have an opening for your toes.   · Keep the inside and outside of your house well lit.   · Use night lights throughout your home.   · Remove clutter from floors.   · Clean up floor spills.   · Remove throw rugs or fasten them to the floor with carpet tape.   · Do not place electrical cords across pathways.   · Put grab bars by your tub, shower, and toilet. Do not use towel bars as grab bars.   · Put handrails on both sides of the stairway. Fix loose handrails.   · Do not climb on stools or stepladders, if possible.   · Do not wax your floors.   · Repair uneven or unsafe sidewalks, walkways, or stairs.   · Keep items you use a lot within reach.   · Be aware of pets.   · Keep emergency numbers next to the telephone.   · Put smoke detectors in your home and near bedrooms.   Ask your doctor what other things you can do to prevent falls.  Document Released: 10/14/2010 Document Revised: 06/18/2013 Document Reviewed: 03/19/2013  ExitCare® Patient Information ©2013 [employer].    Exercise to Stay Healthy      Exercise helps you become and stay healthy.  EXERCISE IDEAS AND TIPS  Choose exercises that:  · You enjoy.   · Fit into your day.   You do not need to exercise really hard to be healthy. You can do exercises at a slow or medium level and stay healthy. You can:  · Stretch before and after working out.   · Try yoga, Pilates, or john chi.   · Lift weights.   · Walk fast, swim, jog, run, climb stairs, bicycle, dance, or rollerskate.   · Take aerobic classes.   Exercises that burn about 150  calories:  · Running 1 ½ miles in 15 minutes.   · Playing volleyball for 45 to 60 minutes.   · Washing and waxing a car for 45 to 60 minutes.   · Playing touch football for 45 minutes.   · Walking 1 ¾ miles in 35 minutes.   · Pushing a stroller 1 ½ miles in 30 minutes.   · Playing basketball for 30 minutes.   · Raking leaves for 30 minutes.   · Bicycling 5 miles in 30 minutes.   · Walking 2 miles in 30 minutes.   · Dancing for 30 minutes.   · Shoveling snow for 15 minutes.   · Swimming laps for 20 minutes.   · Walking up stairs for 15 minutes.   · Bicycling 4 miles in 15 minutes.   · Gardening for 30 to 45 minutes.   · Jumping rope for 15 minutes.   · Washing windows or floors for 45 to 60 minutes.     One suggestion is to start walking for 10 minutes after each meal.  This will help with digestion and help you to metabolize your meal.       For Weight Loss -   Recommend portion sizes with more fruits/vegetables/high fiber foods.  Stay away from white bread/pastas/white rice/white potatoes.  Increase Fluid intake to 6-8 glasses of water daily.   Eliminate soda's (diet and regular) from your fluid intake.      Document Released: 01/20/2012 Document Revised: 03/11/2013 Document Reviewed: 01/20/2012  ExitCare® Patient Information ©2013 [employer].    Fat and Cholesterol Control Diet  Cholesterol is a wax-like substance. It comes from your liver and is found in certain foods. There is good (HDL) and bad (LDL) cholesterol. Too much cholesterol in your blood can affect your heart. Certain foods can lower or raise your cholesterol. Eat foods that are low in cholesterol.  Saturated and trans fats are bad fats found in foods that will raise your cholesterol. Do not eat foods that are high in saturated and trans fats.  FOODS HIGHER IN SATURATED AND TRANS FATS  · Dairy products, such as whole milk, eggs, cheese, cream, and butter.   · Fatty meats, such as hot dogs, sausage, and salami.   · Fried foods.   · Trans fats which  are found in margarine and pre-made cookies, crackers, and baked goods.   · Tropical oils, such as coconut and palm oils.   Read package labels at the store. Do not buy products that use saturated or trans fats or hydrogenated oils. Find foods labeled:  · Low-fat.   · Low-saturated fat.   · Trans-fat-free.   · Low-cholesterol.   FOODS LOWER IN CHOLESTEROL  ·  Fruit.   · Vegetables.   · Beans, peas, and lentils.   · Fish.   · Lean meat, such as chicken (without skin) or ground turkey.   · Grains, such as barley, rice, couscous, bulgur wheat, and pasta.   · Heart-healthy tub margarine.   PREPARING YOUR FOOD  · Broil, bake, steam, or roast foods. Do not aleman food.   · Use non-stick cooking sprays.   · Use lemon or herbs to flavor food instead of using butter or stick margarine.   · Use nonfat yogurt, salsa, or low-fat dressings for salads.   Document Released: 06/18/2013 Document Reviewed: 06/18/2013  ExitCare® Patient Information ©2013 [employer].    Recommend annual flu vaccine.  Next due in Fall, 2015.  If you decide not to have the flu vaccine, recommend good handwashing and staying out of crowds during flu season.        Basic Carbohydrate Counting for Diabetes Mellitus  Carbohydrate counting is a method for keeping track of the amount of carbohydrates you eat. Eating carbohydrates naturally increases the level of sugar (glucose) in your blood, so it is important for you to know the amount that is okay for you to have in every meal. Carbohydrate counting helps keep the level of glucose in your blood within normal limits. The amount of carbohydrates allowed is different for every person. A dietitian can help you calculate the amount that is right for you. Once you know the amount of carbohydrates you can have, you can count the carbohydrates in the foods you want to eat.  Carbohydrates are found in the following foods:  · Grains, such as breads and cereals.  · Dried beans and soy products.  · Starchy vegetables,  "such as potatoes, peas, and corn.  · Fruit and fruit juices.  · Milk and yogurt.  · Sweets and snack foods, such as cake, cookies, candy, chips, soft drinks, and fruit drinks.  CARBOHYDRATE COUNTING  There are two ways to count the carbohydrates in your food. You can use either of the methods or a combination of both.  Reading the \"Nutrition Facts\" on Packaged Food  The \"Nutrition Facts\" is an area that is included on the labels of almost all packaged food and beverages in the [place]. It includes the serving size of that food or beverage and information about the nutrients in each serving of the food, including the grams (g) of carbohydrate per serving.   Decide the number of servings of this food or beverage that you will be able to eat or drink. Multiply that number of servings by the number of grams of carbohydrate that is listed on the label for that serving. The total will be the amount of carbohydrates you will be having when you eat or drink this food or beverage.  Learning Standard Serving Sizes of Food  When you eat food that is not packaged or does not include \"Nutrition Facts\" on the label, you need to measure the servings in order to count the amount of carbohydrates. A serving of most carbohydrate-rich foods contains about 15 g of carbohydrates. The following list includes serving sizes of carbohydrate-rich foods that provide 15 g of carbohydrate per serving:    · 1 slice of bread (1 oz) or 1 six-inch tortilla.    · ½ of a hamburger bun or English muffin.  · 4-6 crackers.  · ¾ cup unsweetened dry cereal.    · ½ cup hot cereal.  ·  cup rice or pasta.    · ½ cup mashed potatoes or ¼ of a large baked potato.  · 1 cup fresh fruit or one small piece of fruit.    · ½ cup canned or frozen fruit or fruit juice.  · 1 cup milk.  ·  cup plain fat-free yogurt or yogurt sweetened with artificial sweeteners.  · ½ cup cooked dried beans or starchy vegetable, such as peas, corn, or potatoes.    Decide the number " of standard-size servings that you will eat. Multiply that number of servings by 15 (the grams of carbohydrates in that serving). For example, if you eat 2 cups of strawberries, you will have eaten 2 servings and 30 g of carbohydrates (2 servings x 15 g = 30 g). For foods such as soups and casseroles, in which more than one food is mixed in, you will need to count the carbohydrates in each food that is included.  EXAMPLE OF CARBOHYDRATE COUNTING  Sample Dinner   · 3 oz chicken breast.  ·  cup of brown rice.  · ½ cup of corn.  · 1 cup milk.    · 1 cup strawberries with sugar-free whipped topping.    Carbohydrate Calculation   Step 1: Identify the foods that contain carbohydrates:   · Rice.    · Corn.    · Milk.    · Strawberries.  Step 2: Calculate the number of servings eaten of each:   · 2 servings of rice.    · 1 serving of corn.    · 1 serving of milk.    · 1 serving of strawberries.  Step 3:  Multiply each of those number of servings by 15 g:   · 2 servings of rice x 15 g = 30 g.    · 1 serving of corn x 15 g = 15 g.    · 1 serving of milk x 15 g = 15 g.    · 1 serving of strawberries x 15 g = 15 g.  Step 4: Add together all of the amounts to find the total grams of carbohydrates eaten: 30 g + 15 g + 15 g + 15 g = 75 g.     This information is not intended to replace advice given to you by your health care provider. Make sure you discuss any questions you have with your health care provider.     Document Released: 12/18/2006 Document Revised: 01/08/2016 Document Reviewed: 11/14/2014  [employer] Interactive Patient Education ©2016 [employer]

## 2017-04-04 NOTE — ASSESSMENT & PLAN NOTE
/84  Chronic condition managed with current medical regimen  Stable per review   Continue with current meds  Followed by Elle Cooper M.D..

## 2017-04-04 NOTE — ASSESSMENT & PLAN NOTE
Patient aware of relationship between weight and health and working on diet  Followed by Elle Cooper M.D..

## 2017-04-04 NOTE — PROGRESS NOTES
CC:   Medicare Annual Wellness Visit    HPI:  Perez is a 62 y.o. here for Medicare Annual Wellness Visit    Patient Active Problem List    Diagnosis Date Noted   • Type 2 diabetes mellitus with hyperlipidemia (CMS-HCC) 02/13/2017   • Dyslipidemia 07/11/2016   • Essential hypertension 07/11/2016   • Type II diabetes mellitus, uncontrolled (CMS-HCC) 12/29/2015   • Heme positive stool 06/08/2015   • History of acute anterior wall myocardial infarction 03/09/2012   • CAD BMS LAD 03/09/2012   • Obesity 03/09/2012     Current Outpatient Prescriptions   Medication Sig Dispense Refill   • SYNJARDY 12.5-1000 MG Tab Take 1 tablet by mouth 2 Times a Day. 60 Tab 3   • VICTOZA 18 MG/3ML Solution Pen-injector injection Inject 0.3 mL as instructed every day. 3 PEN 6   • glucose blood (FREESTYLE LITE) strip Use one strip to check blood sugar twice a day as needed. 200 Strip 3   • FREESTYLE LANCETS Misc Use one to check glucose twice a day 100 Each 3   • metoprolol SR (TOPROL XL) 100 MG TABLET SR 24 HR Take 1 Tab by mouth every day. 90 Tab 3   • moexipril (UNIVASC) 7.5 MG Tab Take 0.5 Tabs by mouth every day. 45 Tab 3   • nitroglycerin (NITROSTAT) 0.4 MG SL Tab Place 1 Tab under tongue as needed. 10 Tab 0   • atorvastatin (LIPITOR) 40 MG TABS Take 1 Tab by mouth every evening. 90 Each 1   • niacin SR (NIASPAN) 500 MG TBCR Take 1 Tab by mouth 3 times a day. 90 Each 6   • [facility] ASPIRIN 81 MG PO TBEC QAM.        No current facility-administered medications for this visit.      Current supplements: no  Chronic narcotic pain medicines: no  Allergies: Review of patient's allergies indicates no known allergies.  Exercise: as raine  Current social contact/activities: Has support of family and friends      Screening:  Depression Screening    Little interest or pleasure in doing things?  0 - not at all  Feeling down, depressed , or hopeless? 0 - not at all  Trouble falling or staying asleep, or sleeping too much?     Feeling tired or having  little energy?     Poor appetite or overeating?     Feeling bad about yourself - or that you are a failure or have let yourself or your family down?    Trouble concentrating on things, such as reading the newspaper or watching television?    Moving or speaking so slowly that other people could have noticed.  Or the opposite - being so fidgety or restless that you have been moving around a lot more than usual?     Thoughts that you would be better off dead, or of hurting yourself?     Patient Health Questionnaire Score:    If depressive symptoms identified deferred to follow up visit unless specifically addressed in assessment and plan.      Screening for Cognitive Impairment    Three Minute Recall (banana, sunrise, fence)  2/3    Draw clock face with all 12 numbers set to the hand to show 10 minures past 11 o'clock  1 5  If cognitive concerns identified deferred to follow up visit unless specifically addressed in assessment and plan.    Fall Risk Assessment    Has the patient had two or more falls in the last year or any fall with injury in the last year?  No  If Fall Risk identified deferred to follow up visit unless specifically addressed in assessment and plan.    Safety Assessment    Throw rugs on floor.  No  Handrails on all stairs.  Yes  Good lighting in all hallways.  Yes  Difficulty hearing.  No  Patient counseled about all safety risks that were identified.    Functional Assessment ADLs    Are there any barriers preventing you from cooking for yourself or meeting nutritional needs?  No.    Are there any barriers preventing you from driving safely or obtaining transportation?  No.    Are there any barriers preventing you from using a telephone or calling for help?  No.    Are there any barriers preventing you from shopping?  No.    Are there any barriers preventing you from taking care of your own finances?  No.    Are there any barriers preventing you from managing your medications?  No.    Are currently  engaing any exercise or physical activity?  No.       Health Maintenance Summary                Annual Wellness Visit Overdue 1955     IMM DTaP/Tdap/Td Vaccine Overdue 3/27/1974     COLONOSCOPY Overdue 3/27/2005     RETINAL SCREENING Overdue 2/15/2015      Done 2/15/2014 told no DM changes.    IMM ZOSTER VACCINE Overdue 3/27/2015     A1C SCREENING Next Due 7/9/2017      Done 1/9/2017 HEMOGLOBIN A1C (A)     Patient has more history with this topic...    DIABETES MONOFILAMENT / LE EXAM Next Due 7/11/2017      Done 7/11/2016 AMB DIABETIC MONOFILAMENT LOWER EXTREMITY EXAM     Patient has more history with this topic...    FASTING LIPID PROFILE Next Due 1/9/2018      Done 1/9/2017 LIPID PANEL (A)     Patient has more history with this topic...    URINE ACR / MICROALBUMIN Next Due 1/9/2018      Done 1/9/2017 MICROALB/CREAT RATIO RAND. UR     Patient has more history with this topic...    SERUM CREATININE Next Due 1/9/2018      Done 1/9/2017 COMP METABOLIC PANEL (A)     Patient has more history with this topic...          Patient Care Team:  Elle Cooper M.D. as PCP - General (Internal Medicine)  Tunde Sung M.D. as Consulting Physician (Cardiology)  Gulshan Mota PA-C as Consulting Physician (Endocrinology)        Social History   Substance Use Topics   • Smoking status: Never Smoker    • Smokeless tobacco: Never Used   • Alcohol Use: No       Family History   Problem Relation Age of Onset   • Heart Disease Mother      CHF   • Diabetes Brother    • Other Father      son does not father's med hx     He  has a past medical history of Overweight and obesity; Pure hypercholesterolemia; Acute MI (CMS-HCC); Diabetes mellitus; Hypertension; and Heme positive stool (6/8/2015).   History reviewed. No pertinent past surgical history.    ROS:    Ostomy or other tubes or amputations: no  Chronic oxygen use no  Last eye exam 2013?, pt requested to sched eye exam for retinal health  : Denies incontinence.   Gait:  Uses no assistive device   Hearing excellent.    Dentition fair    Exam: Blood pressure 142/82, pulse 94, temperature 36.6 °C (97.9 °F), height 1.829 m (6'), weight 129.275 kg (285 lb), SpO2 96 %. Body mass index is 38.64 kg/(m^2).  Alert, oriented in no acute distress.  Eye contact is good, speech goal directed, affect calm      Assessment and Plan. The following treatment and monitoring plan is recommended for all problems as listed below:   History of acute anterior wall myocardial infarction  Followed by cardiology q 6 months  Denies cardiac sxs  Continue with current medications     CAD BMS LAD  Followed by cardiology q 6 months  Denies cardiac sxs  Continue with current medications     Obesity  Patient aware of relationship between weight and health and working on diet  Followed by Elle Cooper M.D..     Type II diabetes mellitus, uncontrolled  Chronic condition managed with current medical regimen  1/9/2017    Glucose 184 (H) 65 - 99 mg/dL Final   A1C 8.1  States home fasting glucose this am 130   Continue with current meds and attention to dietary intake  Followed by Elle Cooper M.D..        Dyslipidemia  Chronic condition managed with current medical regimen  1/9/2017   Cholesterol,Tot 127 100 - 199 mg/dL Final      Triglycerides 90 0 - 149 mg/dL Final     HDL 34 (L) >39 mg/dL Final     VLDL Cholesterol Calc 18 5 - 40 mg/dL Final     LDL 75 0 - 99 mg/dL    Stable per review   Continue with current meds  Followed by Elle Cooper M.D..        Essential hypertension  /84  Chronic condition managed with current medical regimen  Stable per review   Continue with current meds  Followed by Elle Cooper M.D..        Type 2 diabetes mellitus with hyperlipidemia (CMS-HCC)  Chronic condition managed with current medical regimen  Lipids wnl, glucose and A1VC abnormal   Continue with current meds  Followed by Elle Cooper M.D..        Heme positive stool  Pt states no obvious blood in his  stool  States will not have a colonoscopy, discussed reasons to consider having the testing  Order placed in chart today and pt will reconsider  Order placed for FIT, pt agrees and will send stool sample     Encounter for long-term (current) use of insulin (CMS-[facility])  No longer on insulin        Health Care Screening recommendations reviewed with patient today: per Patient Instructions  DPA/Advanced directive: .has NO advanced directive - not interested in additional information    Next office visit for recheck of chronic medical conditions is due with Elle Cooper M.D.5/8/2017

## 2017-04-04 NOTE — ASSESSMENT & PLAN NOTE
Chronic condition managed with current medical regimen  1/9/2017    Glucose 184 (H) 65 - 99 mg/dL Final   A1C 8.1  States home fasting glucose this am 130   Continue with current meds and attention to dietary intake  Followed by Elle Cooper M.D..

## 2017-04-04 NOTE — ASSESSMENT & PLAN NOTE
Chronic condition managed with current medical regimen  Lipids wnl, glucose and A1VC abnormal   Continue with current meds  Followed by Elle Cooper M.D..

## 2017-04-04 NOTE — MR AVS SNAPSHOT
Perez Leone   2017 8:20 AM   Office Visit   MRN: [id number]    Department:  [facility]   Dept Phone:  [phone]    Description:  Male : 1955   Provider:  CYNTHIA Mendosa           Reason for Visit     Annual Exam initial      Allergies as of 2017     No Known Allergies      You were diagnosed with     Medicare annual wellness visit, initial   [370111]  -  Primary     History of acute anterior wall myocardial infarction   [620858]       Dyslipidemia   [135154]       Essential hypertension   [2024933]       Type 2 diabetes mellitus with hyperlipidemia (CMS-HCC)   [3101216]       Heme positive stool   [212374]       Encounter for long-term (current) use of insulin (CMS-[facility])   [V58.67.ICD-9-CM]       Screen for colon cancer   [568514]       Encounter for screening colonoscopy   [203761]         Vital Signs     Blood Pressure Pulse Temperature Height Weight Body Mass Index    142/82 mmHg 94 36.6 °C (97.9 °F) 1.829 m (6') 129.275 kg (285 lb) 38.64 kg/m2    Oxygen Saturation Smoking Status                96% Never Smoker           Basic Information     Date Of Birth Sex Race Ethnicity Preferred Language    1955 Male White Non- English      Your appointments     May 08, 2017 11:00 AM   Established Patient with Elle Cooper M.D.   [facility] [address] [place] [address] [phone]           You will be receiving a confirmation call a few days before your appointment from our automated call confirmation system.            2017 10:40 AM   Established Patient with Gulshan Mota PA-C   [facility] ([facility])    [address]  [facility] [address] [phone]           You will be receiving a confirmation call a few days before your appointment from our automated call confirmation system.              Problem List              ICD-10-CM Priority Class  Noted - Resolved    History of acute anterior wall myocardial infarction I25.2   3/9/2012 - Present    CAD BMS LAD I25.10   3/9/2012 - Present    Obesity E66.9   3/9/2012 - Present    Heme positive stool R19.5   6/8/2015 - Present    Type II diabetes mellitus, uncontrolled (CMS-HCC) E11.65   12/29/2015 - Present    Dyslipidemia E78.5   7/11/2016 - Present    Essential hypertension I10   7/11/2016 - Present    Type 2 diabetes mellitus with hyperlipidemia (CMS-HCC) E11.69, E78.5   2/13/2017 - Present      Health Maintenance        Date Due Completion Dates    IMM DTaP/Tdap/Td Vaccine (1 - Tdap) 3/27/1974 ---    COLONOSCOPY 3/27/2005 ---    RETINAL SCREENING 2/15/2015 2/15/2014 (Done)    Override on 2/15/2014: Done (told no DM changes.)    IMM ZOSTER VACCINE 3/27/2015 ---    A1C SCREENING 7/9/2017 1/9/2017, 10/3/2016, 7/11/2016, 3/14/2016, 12/29/2015, 6/23/2015, 8/15/2014 (Done), 1/13/2014, 10/19/2011, 9/6/2008    Override on 8/15/2014: Done (10.8% ( done as POC))    DIABETES MONOFILAMENT / LE EXAM 7/11/2017 7/11/2016, 12/29/2015    FASTING LIPID PROFILE 1/9/2018 1/9/2017, 3/14/2016, 6/23/2015, 1/13/2014, 10/23/2012    URINE ACR / MICROALBUMIN 1/9/2018 1/9/2017, 3/14/2016, 6/23/2015, 1/13/2014    SERUM CREATININE 1/9/2018 1/9/2017, 10/3/2016, 3/14/2016, 6/23/2015, 1/13/2014, 9/6/2008            Current Immunizations     Influenza Vaccine Quad Inj (Preserved) 10/24/2016, 9/30/2015  5:18 PM    Pneumococcal polysaccharide vaccine (PPSV-23) 3/30/2016      Below and/or attached are the medications your provider expects you to take. Review all of your home medications and newly ordered medications with your provider and/or pharmacist. Follow medication instructions as directed by your provider and/or pharmacist. Please keep your medication list with you and share with your provider. Update the information when medications are discontinued, doses are changed, or new medications (including over-the-counter products) are added;  and carry medication information at all times in the event of emergency situations     Allergies:  No Known Allergies          Medications  Valid as of: April 04, 2017 -  8:46 AM    Generic Name Brand Name Tablet Size Instructions for use    Aspirin (Tablet Delayed Response) ST SANCHEZ ASPIRIN 81 MG QAM.         Atorvastatin Calcium (Tab) LIPITOR 40 MG Take 1 Tab by mouth every evening.        Empagliflozin-Metformin HCl (Tab) SYNJARDY 12.5-1000 MG Take 1 tablet by mouth 2 Times a Day.        Glucose Blood (Strip) glucose blood  Use one strip to check blood sugar twice a day as needed.        Lancets (Misc) FREESTYLE LANCETS  Use one to check glucose twice a day        Liraglutide (Solution Pen-injector) VICTOZA 18 MG/3ML Inject 0.3 mL as instructed every day.        Metoprolol Succinate (TABLET SR 24 HR) TOPROL  MG Take 1 Tab by mouth every day.        Moexipril HCl (Tab) UNIVASC 7.5 MG Take 0.5 Tabs by mouth every day.        Niacin (Antihyperlipidemic) (Tab CR) NIASPAN 500 MG Take 1 Tab by mouth 3 times a day.        Nitroglycerin (SL Tab) NITROSTAT 0.4 MG Place 1 Tab under tongue as needed.        .                 Medicines prescribed today were sent to:     [facility] [address] [place] [address] (S), [place] - [address] [address] [facility] (S) [place] [address]    Phone: [phone] Fax: [phone]    Open 24 Hours?: No      Medication refill instructions:       If your prescription bottle indicates you have medication refills left, it is not necessary to call your provider’s office. Please contact your pharmacy and they will refill your medication.    If your prescription bottle indicates you do not have any refills left, you may request refills at any time through one of the following ways: The online [employer] system (except Urgent Care), by calling your provider’s office, or by asking your pharmacy to contact your provider’s office with a refill request. Medication refills are processed only during regular  business hours and may not be available until the next business day. Your provider may request additional information or to have a follow-up visit with you prior to refilling your medication.   *Please Note: Medication refills are assigned a new Rx number when refilled electronically. Your pharmacy may indicate that no refills were authorized even though a new prescription for the same medication is available at the pharmacy. Please request the medicine by name with the pharmacy before contacting your provider for a refill.        Instructions    Continue with care through Elle Cooper M.D..  Next Medicare Annual Wellness Visit is due in one year.    Continue care with specialists you are seeing for your chronic problems.    Attached is some preventative information for you to read.          Fall Prevention and Home Safety  Falls cause injuries and can affect all age groups. It is possible to prevent falls.   HOW TO PREVENT FALLS  · Wear shoes with rubber soles that do not have an opening for your toes.   · Keep the inside and outside of your house well lit.   · Use night lights throughout your home.   · Remove clutter from floors.   · Clean up floor spills.   · Remove throw rugs or fasten them to the floor with carpet tape.   · Do not place electrical cords across pathways.   · Put grab bars by your tub, shower, and toilet. Do not use towel bars as grab bars.   · Put handrails on both sides of the stairway. Fix loose handrails.   · Do not climb on stools or stepladders, if possible.   · Do not wax your floors.   · Repair uneven or unsafe sidewalks, walkways, or stairs.   · Keep items you use a lot within reach.   · Be aware of pets.   · Keep emergency numbers next to the telephone.   · Put smoke detectors in your home and near bedrooms.   Ask your doctor what other things you can do to prevent falls.  Document Released: 10/14/2010 Document Revised: 06/18/2013 Document Reviewed: 03/19/2013  ExitCare® Patient  Information ©2013 [employer].    Exercise to Stay Healthy      Exercise helps you become and stay healthy.  EXERCISE IDEAS AND TIPS  Choose exercises that:  · You enjoy.   · Fit into your day.   You do not need to exercise really hard to be healthy. You can do exercises at a slow or medium level and stay healthy. You can:  · Stretch before and after working out.   · Try yoga, Pilates, or john chi.   · Lift weights.   · Walk fast, swim, jog, run, climb stairs, bicycle, dance, or rollerskate.   · Take aerobic classes.   Exercises that burn about 150 calories:  · Running 1 ½ miles in 15 minutes.   · Playing volleyball for 45 to 60 minutes.   · Washing and waxing a car for 45 to 60 minutes.   · Playing touch football for 45 minutes.   · Walking 1 ¾ miles in 35 minutes.   · Pushing a stroller 1 ½ miles in 30 minutes.   · Playing basketball for 30 minutes.   · Raking leaves for 30 minutes.   · Bicycling 5 miles in 30 minutes.   · Walking 2 miles in 30 minutes.   · Dancing for 30 minutes.   · Shoveling snow for 15 minutes.   · Swimming laps for 20 minutes.   · Walking up stairs for 15 minutes.   · Bicycling 4 miles in 15 minutes.   · Gardening for 30 to 45 minutes.   · Jumping rope for 15 minutes.   · Washing windows or floors for 45 to 60 minutes.     One suggestion is to start walking for 10 minutes after each meal.  This will help with digestion and help you to metabolize your meal.       For Weight Loss -   Recommend portion sizes with more fruits/vegetables/high fiber foods.  Stay away from white bread/pastas/white rice/white potatoes.  Increase Fluid intake to 6-8 glasses of water daily.   Eliminate soda's (diet and regular) from your fluid intake.      Document Released: 01/20/2012 Document Revised: 03/11/2013 Document Reviewed: 01/20/2012  ExitCare® Patient Information ©2013 [employer].    Fat and Cholesterol Control Diet  Cholesterol is a wax-like substance. It comes from your liver and is found in certain  foods. There is good (HDL) and bad (LDL) cholesterol. Too much cholesterol in your blood can affect your heart. Certain foods can lower or raise your cholesterol. Eat foods that are low in cholesterol.  Saturated and trans fats are bad fats found in foods that will raise your cholesterol. Do not eat foods that are high in saturated and trans fats.  FOODS HIGHER IN SATURATED AND TRANS FATS  · Dairy products, such as whole milk, eggs, cheese, cream, and butter.   · Fatty meats, such as hot dogs, sausage, and salami.   · Fried foods.   · Trans fats which are found in margarine and pre-made cookies, crackers, and baked goods.   · Tropical oils, such as coconut and palm oils.   Read package labels at the store. Do not buy products that use saturated or trans fats or hydrogenated oils. Find foods labeled:  · Low-fat.   · Low-saturated fat.   · Trans-fat-free.   · Low-cholesterol.   FOODS LOWER IN CHOLESTEROL  ·  Fruit.   · Vegetables.   · Beans, peas, and lentils.   · Fish.   · Lean meat, such as chicken (without skin) or ground turkey.   · Grains, such as barley, rice, couscous, bulgur wheat, and pasta.   · Heart-healthy tub margarine.   PREPARING YOUR FOOD  · Broil, bake, steam, or roast foods. Do not aleman food.   · Use non-stick cooking sprays.   · Use lemon or herbs to flavor food instead of using butter or stick margarine.   · Use nonfat yogurt, salsa, or low-fat dressings for salads.   Document Released: 06/18/2013 Document Reviewed: 06/18/2013  ExitCare® Patient Information ©2013 [employer].    Recommend annual flu vaccine.  Next due in Fall, 2015.  If you decide not to have the flu vaccine, recommend good handwashing and staying out of crowds during flu season.        Basic Carbohydrate Counting for Diabetes Mellitus  Carbohydrate counting is a method for keeping track of the amount of carbohydrates you eat. Eating carbohydrates naturally increases the level of sugar (glucose) in your blood, so it is important  "for you to know the amount that is okay for you to have in every meal. Carbohydrate counting helps keep the level of glucose in your blood within normal limits. The amount of carbohydrates allowed is different for every person. A dietitian can help you calculate the amount that is right for you. Once you know the amount of carbohydrates you can have, you can count the carbohydrates in the foods you want to eat.  Carbohydrates are found in the following foods:  · Grains, such as breads and cereals.  · Dried beans and soy products.  · Starchy vegetables, such as potatoes, peas, and corn.  · Fruit and fruit juices.  · Milk and yogurt.  · Sweets and snack foods, such as cake, cookies, candy, chips, soft drinks, and fruit drinks.  CARBOHYDRATE COUNTING  There are two ways to count the carbohydrates in your food. You can use either of the methods or a combination of both.  Reading the \"Nutrition Facts\" on Packaged Food  The \"Nutrition Facts\" is an area that is included on the labels of almost all packaged food and beverages in the [place]. It includes the serving size of that food or beverage and information about the nutrients in each serving of the food, including the grams (g) of carbohydrate per serving.   Decide the number of servings of this food or beverage that you will be able to eat or drink. Multiply that number of servings by the number of grams of carbohydrate that is listed on the label for that serving. The total will be the amount of carbohydrates you will be having when you eat or drink this food or beverage.  Learning Standard Serving Sizes of Food  When you eat food that is not packaged or does not include \"Nutrition Facts\" on the label, you need to measure the servings in order to count the amount of carbohydrates. A serving of most carbohydrate-rich foods contains about 15 g of carbohydrates. The following list includes serving sizes of carbohydrate-rich foods that provide 15 g of carbohydrate " per serving:    · 1 slice of bread (1 oz) or 1 six-inch tortilla.    · ½ of a hamburger bun or English muffin.  · 4-6 crackers.  · ¾ cup unsweetened dry cereal.    · ½ cup hot cereal.  ·  cup rice or pasta.    · ½ cup mashed potatoes or ¼ of a large baked potato.  · 1 cup fresh fruit or one small piece of fruit.    · ½ cup canned or frozen fruit or fruit juice.  · 1 cup milk.  ·  cup plain fat-free yogurt or yogurt sweetened with artificial sweeteners.  · ½ cup cooked dried beans or starchy vegetable, such as peas, corn, or potatoes.    Decide the number of standard-size servings that you will eat. Multiply that number of servings by 15 (the grams of carbohydrates in that serving). For example, if you eat 2 cups of strawberries, you will have eaten 2 servings and 30 g of carbohydrates (2 servings x 15 g = 30 g). For foods such as soups and casseroles, in which more than one food is mixed in, you will need to count the carbohydrates in each food that is included.  EXAMPLE OF CARBOHYDRATE COUNTING  Sample Dinner   · 3 oz chicken breast.  ·  cup of brown rice.  · ½ cup of corn.  · 1 cup milk.    · 1 cup strawberries with sugar-free whipped topping.    Carbohydrate Calculation   Step 1: Identify the foods that contain carbohydrates:   · Rice.    · Corn.    · Milk.    · Strawberries.  Step 2: Calculate the number of servings eaten of each:   · 2 servings of rice.    · 1 serving of corn.    · 1 serving of milk.    · 1 serving of strawberries.  Step 3:  Multiply each of those number of servings by 15 g:   · 2 servings of rice x 15 g = 30 g.    · 1 serving of corn x 15 g = 15 g.    · 1 serving of milk x 15 g = 15 g.    · 1 serving of strawberries x 15 g = 15 g.  Step 4: Add together all of the amounts to find the total grams of carbohydrates eaten: 30 g + 15 g + 15 g + 15 g = 75 g.     This information is not intended to replace advice given to you by your health care provider. Make sure you discuss any questions you have  with your health care provider.     Document Released: 12/18/2006 Document Revised: 01/08/2016 Document Reviewed: 11/14/2014  [employer] Interactive Patient Education ©2016 [employer]            [employer] Access Code: Activation code not generated  Current [employer] Status: Active

## 2017-04-04 NOTE — ASSESSMENT & PLAN NOTE
Pt states no obvious blood in his stool  States will not have a colonoscopy, discussed reasons to consider having the testing  Order placed in chart today and pt will reconsider  Order placed for FIT, pt agrees and will send stool sample

## 2017-04-04 NOTE — ASSESSMENT & PLAN NOTE
Chronic condition managed with current medical regimen  1/9/2017   Cholesterol,Tot 127 100 - 199 mg/dL Final      Triglycerides 90 0 - 149 mg/dL Final     HDL 34 (L) >39 mg/dL Final     VLDL Cholesterol Calc 18 5 - 40 mg/dL Final     LDL 75 0 - 99 mg/dL    Stable per review   Continue with current meds  Followed by Elle Cooper M.D..

## 2017-04-09 ENCOUNTER — HOSPITAL ENCOUNTER (OUTPATIENT)
Facility: MEDICAL CENTER | Age: 62
End: 2017-04-09
Attending: NURSE PRACTITIONER
Payer: MEDICARE

## 2017-04-09 PROCEDURE — 82274 ASSAY TEST FOR BLOOD FECAL: CPT

## 2017-04-12 LAB — HEMOCCULT STL QL IA: POSITIVE

## 2017-04-25 LAB
ALBUMIN SERPL-MCNC: 4 G/DL (ref 3.6–4.8)
ALBUMIN/GLOB SERPL: 1.7 {RATIO} (ref 1.2–2.2)
ALP SERPL-CCNC: 71 IU/L (ref 39–117)
ALT SERPL-CCNC: 17 IU/L (ref 0–44)
AST SERPL-CCNC: 14 IU/L (ref 0–40)
BASOPHILS # BLD AUTO: 0 X10E3/UL (ref 0–0.2)
BASOPHILS NFR BLD AUTO: 0 %
BILIRUB SERPL-MCNC: 0.5 MG/DL (ref 0–1.2)
BUN SERPL-MCNC: 17 MG/DL (ref 8–27)
BUN/CREAT SERPL: 12 (ref 10–24)
CALCIUM SERPL-MCNC: 9.7 MG/DL (ref 8.6–10.2)
CHLORIDE SERPL-SCNC: 104 MMOL/L (ref 96–106)
CHOLEST SERPL-MCNC: 111 MG/DL (ref 100–199)
CO2 SERPL-SCNC: 20 MMOL/L (ref 18–29)
COMMENT 011824: ABNORMAL
CREAT SERPL-MCNC: 1.37 MG/DL (ref 0.76–1.27)
EOSINOPHIL # BLD AUTO: 0.4 X10E3/UL (ref 0–0.4)
EOSINOPHIL NFR BLD AUTO: 5 %
ERYTHROCYTE [DISTWIDTH] IN BLOOD BY AUTOMATED COUNT: 15 % (ref 12.3–15.4)
GLOBULIN SER CALC-MCNC: 2.4 G/DL (ref 1.5–4.5)
GLUCOSE SERPL-MCNC: 134 MG/DL (ref 65–99)
HBA1C MFR BLD: 7.2 % (ref 4.8–5.6)
HCT VFR BLD AUTO: 42.3 % (ref 37.5–51)
HDLC SERPL-MCNC: 34 MG/DL
HGB BLD-MCNC: 13.7 G/DL (ref 12.6–17.7)
IMM GRANULOCYTES # BLD: 0 X10E3/UL (ref 0–0.1)
IMM GRANULOCYTES NFR BLD: 0 %
IMMATURE CELLS  115398: ABNORMAL
LDLC SERPL CALC-MCNC: 57 MG/DL (ref 0–99)
LYMPHOCYTES # BLD AUTO: 2.3 X10E3/UL (ref 0.7–3.1)
LYMPHOCYTES NFR BLD AUTO: 25 %
MCH RBC QN AUTO: 32.2 PG (ref 26.6–33)
MCHC RBC AUTO-ENTMCNC: 32.4 G/DL (ref 31.5–35.7)
MCV RBC AUTO: 100 FL (ref 79–97)
MONOCYTES # BLD AUTO: 1 X10E3/UL (ref 0.1–0.9)
MONOCYTES NFR BLD AUTO: 11 %
MORPHOLOGY BLD-IMP: ABNORMAL
NEUTROPHILS # BLD AUTO: 5.5 X10E3/UL (ref 1.4–7)
NEUTROPHILS NFR BLD AUTO: 59 %
NRBC BLD AUTO-RTO: ABNORMAL %
PLATELET # BLD AUTO: 246 X10E3/UL (ref 150–379)
POTASSIUM SERPL-SCNC: 4.5 MMOL/L (ref 3.5–5.2)
PROT SERPL-MCNC: 6.4 G/DL (ref 6–8.5)
RBC # BLD AUTO: 4.25 X10E6/UL (ref 4.14–5.8)
SODIUM SERPL-SCNC: 142 MMOL/L (ref 134–144)
TRIGL SERPL-MCNC: 99 MG/DL (ref 0–149)
VLDLC SERPL CALC-MCNC: 20 MG/DL (ref 5–40)
WBC # BLD AUTO: 9.3 X10E3/UL (ref 3.4–10.8)

## 2017-05-05 ENCOUNTER — TELEPHONE (OUTPATIENT)
Dept: MEDICAL GROUP | Age: 62
End: 2017-05-05

## 2017-05-05 NOTE — TELEPHONE ENCOUNTER
ESTABLISHED PATIENT PRE-VISIT PLANNING     Note: Patient will not be contacted if there is no indication to call.     1.  Reviewed note from last office visit with PCP and/or other med group provider: Yes    2.  If any orders were placed at last visit, do we have Results/Consult Notes?        •  Labs - Labs ordered, completed and results are in chart.       •  Imaging - Imaging was not ordered at last office visit.       •  Referrals - Referral ordered, patient has NOT been seen.    3.  Immunizations were updated in [facility] using WebIZ?: Yes       •  Web Iz Recommendations: HEPATITIS A  HEPATITIS B TDAP VARICELLA (Chicken Pox)  ZOSTAVAX (Shingles)    4.  Patient is due for the following Health Maintenance Topics:   Health Maintenance Due   Topic Date Due   • IMM DTaP/Tdap/Td Vaccine (1 - Tdap) 03/27/1974   • COLONOSCOPY  03/27/2005   • RETINAL SCREENING  02/15/2015   • IMM ZOSTER VACCINE  03/27/2015       - Patient has completed FLU and PNEUMOVAX (PPSV23) Immunization(s) per WebIZ. Chart has been updated.    5.  Patient was not informed to arrive 15 min prior to their scheduled appointment and bring in their medication bottles.

## 2017-05-08 ENCOUNTER — OFFICE VISIT (OUTPATIENT)
Dept: MEDICAL GROUP | Age: 62
End: 2017-05-08
Payer: MEDICARE

## 2017-05-08 VITALS
DIASTOLIC BLOOD PRESSURE: 82 MMHG | RESPIRATION RATE: 18 BRPM | SYSTOLIC BLOOD PRESSURE: 130 MMHG | BODY MASS INDEX: 40.09 KG/M2 | HEART RATE: 84 BPM | OXYGEN SATURATION: 98 % | HEIGHT: 70 IN | TEMPERATURE: 98.1 F | WEIGHT: 280 LBS

## 2017-05-08 DIAGNOSIS — N28.9 IMPAIRED RENAL FUNCTION: ICD-10-CM

## 2017-05-08 DIAGNOSIS — I10 ESSENTIAL HYPERTENSION: ICD-10-CM

## 2017-05-08 DIAGNOSIS — E78.5 DYSLIPIDEMIA: ICD-10-CM

## 2017-05-08 DIAGNOSIS — R19.5 POSITIVE FIT (FECAL IMMUNOCHEMICAL TEST): ICD-10-CM

## 2017-05-08 PROCEDURE — 1036F TOBACCO NON-USER: CPT | Performed by: INTERNAL MEDICINE

## 2017-05-08 PROCEDURE — 3045F PR MOST RECENT HEMOGLOBIN A1C LEVEL 7.0-9.0%: CPT | Performed by: INTERNAL MEDICINE

## 2017-05-08 PROCEDURE — G8432 DEP SCR NOT DOC, RNG: HCPCS | Performed by: INTERNAL MEDICINE

## 2017-05-08 PROCEDURE — 99214 OFFICE O/P EST MOD 30 MIN: CPT | Performed by: INTERNAL MEDICINE

## 2017-05-08 PROCEDURE — G8419 CALC BMI OUT NRM PARAM NOF/U: HCPCS | Performed by: INTERNAL MEDICINE

## 2017-05-08 PROCEDURE — G8598 ASA/ANTIPLAT THER USED: HCPCS | Performed by: INTERNAL MEDICINE

## 2017-05-08 RX ORDER — GLIPIZIDE 5 MG/1
5 TABLET ORAL 2 TIMES DAILY
Qty: 60 TAB | Refills: 3 | Status: SHIPPED | OUTPATIENT
Start: 2017-05-08 | End: 2017-05-08

## 2017-05-08 ASSESSMENT — PAIN SCALES - GENERAL: PAINLEVEL: NO PAIN

## 2017-05-08 NOTE — PROGRESS NOTES
He is all is a well Subjective:   Perez De Guzman is a 62 y.o. male here today for evaluation and management of:      Dyslipidemia  Patient is taking atorvastatin 40 mg every evening. She deny he denied side effects from taking atorvastatin. LDL at goal. Liver enzymes are within normal.    Results for PEREZ DE GUZMAN (MRN [id number]) as of 5/8/2017 13:12   Ref. Range 4/24/2017 07:26   Cholesterol,Tot Latest Ref Range: 100-199 mg/dL 111   Triglycerides Latest Ref Range: 0-149 mg/dL 99   HDL Latest Ref Range: >39 mg/dL 34 (L)   LDL Latest Ref Range: 0-99 mg/dL 57   VLDL Cholesterol Calc Latest Ref Range: 5-40 mg/dL 20   Comment: Unknown CANCELED       Essential hypertension  Patient is taking moexipril 3.75 mg daily and metoprolol  mg daily. His blood pressure is stable with current regimens. He denies side effects from taking his blood pressure medications.    Impaired renal function  Patient has increasing creatinine from 1.18 to 1.37 and decreased GFR from 60 to 55 on recent blood test on 4/24/17. He started new medication, Synjardy and Victoza for diabetes. We discussed to stop taking Synjardy for now due to impaired renal function. He will take glipizide 5 mg twice a day for now. He has follow-up appointment with endocrinologist on 6/26/17. Patient with a repeat blood test in 8 weeks.    Uncontrolled type 2 diabetes mellitus with stage 3 chronic kidney disease, without long-term current use of insulin (CMS-HCC)  A1c improved with new regimens. However, his kidney function becomes impaired. We discussed to hold Synjardy and will try glipizide temporarily. He will continue Victoza for now. He will follow with endocrinologist next month. He needs to schedule for retinal exam as he is due for eye exam.    Positive FIT (fecal immunochemical test)  Patient has positive fit test on 4/9/17. He denied abdominal pain or bloody bowel movement. He denied taking NSAIDs. He was referred to GI consultant. However,  "patient has not scheduled for appointment. He stated that he never have colonoscopy in his life before. Patient is advised to schedule for GI appointment. He is not anemic on recent blood test on 4/24/17.         Current medicines (including changes today)  Current Outpatient Prescriptions   Medication Sig Dispense Refill   • glipiZIDE (GLUCOTROL) 5 MG Tab Take 1 Tab by mouth 2 times a day. 60 Tab 3   • VICTOZA 18 MG/3ML Solution Pen-injector injection Inject 0.3 mL as instructed every day. 3 PEN 6   • glucose blood (FREESTYLE LITE) strip Use one strip to check blood sugar twice a day as needed. 200 Strip 3   • FREESTYLE LANCETS Misc Use one to check glucose twice a day 100 Each 3   • metoprolol SR (TOPROL XL) 100 MG TABLET SR 24 HR Take 1 Tab by mouth every day. 90 Tab 3   • moexipril (UNIVASC) 7.5 MG Tab Take 0.5 Tabs by mouth every day. 45 Tab 3   • nitroglycerin (NITROSTAT) 0.4 MG SL Tab Place 1 Tab under tongue as needed. 10 Tab 0   • atorvastatin (LIPITOR) 40 MG TABS Take 1 Tab by mouth every evening. 90 Each 1   • niacin SR (NIASPAN) 500 MG TBCR Take 1 Tab by mouth 3 times a day. 90 Each 6   • [facility] ASPIRIN 81 MG PO TBEC QAM.        No current facility-administered medications for this visit.     He  has a past medical history of Overweight and obesity; Pure hypercholesterolemia; Acute MI (CMS-HCC); Diabetes mellitus; Hypertension; and Heme positive stool (6/8/2015).    ROS   No chest pain, no shortness of breath, no abdominal pain       Objective:     Blood pressure 130/82, pulse 84, temperature 36.7 °C (98.1 °F), resp. rate 18, height 1.778 m (5' 10\"), weight 127.007 kg (280 lb), SpO2 98 %. Body mass index is 40.18 kg/(m^2).   Physical Exam:  General: Alert, oriented and no acute distress.  Eye contact is good, speech goal directed, affect calm  HEENT: conjunctiva non-injected, sclera non-icteric.  Oral mucous membranes pink and moist with no lesions.  Pinna normal.   Lungs: Normal respiratory effort, " clear to auscultation bilaterally with good excursion.  CV: regular rate and rhythm. No murmurs.   Abdomen: soft, non distended, nontender, Bowel sound normal.  Ext: no edema, color normal, vascularity normal, temperature normal        Assessment and Plan:   The following treatment plan was discussed     1. Dyslipidemia  - Well-controlled. Continue current regimens. Recheck lab 1-2 weeks before next follow up visit.  - Advised to eat low fat, low carbohydrate and high fiber diet as well as do cardio physical exercise regularly to lose weight.    2. Essential hypertension  - Well-controlled. Continue current regimens. Recheck lab 1-2 weeks before next follow up visit.  - Recommend to monitor blood pressure and heart rate at home.  - COMP METABOLIC PANEL; Future  - MICROALBUMIN CREAT RATIO URINE; Future    3. Impaired renal function  - abnormal impaired renal function on recent blood test. Discussed to avoid NSAIDs. Discussed to increase water intake. We will hold Synjardy for now.   - Will continue Victoza and add Glipizide 5 mg twice a day for DM.  - Will reassess kidney function next month.  - COMP METABOLIC PANEL; Future  - MICROALBUMIN CREAT RATIO URINE; Future    4. Uncontrolled type 2 diabetes mellitus with stage 3 chronic kidney disease, without long-term current use of insulin (CMS-HCC)  - Will decrease the dose of Synjardy from 12.5-1000 mg bid to 5/500 mg bid.    - Will continue Victoza.  - Will reassess kidney function next month.  - glipiZIDE (GLUCOTROL) 5 MG Tab; Take 1 Tab by mouth 2 times a day.  Dispense: 60 Tab; Refill: 3    5. Positive FIT (fecal immunochemical test)  - I reviewed stool fit test with patient again today. He has positive stool fit test on 4/9/17. I referred him to GI. He has not scheduled with GI yet. I reprinted contact information of the GI consultant and advised him to call to make appointment with GI consultant.        Followup: Return in about 8 weeks (around 6/30/2017), or if  symptoms worsen or fail to improve, for impaired renal function, hypertension, diabetes, hyperlipidemia, lab review.      Please note that this dictation was created using voice recognition software. I have made every reasonable attempt to correct obvious errors, but I expect that there may have unintended errors in text, spelling, punctuation, or grammar that I did not discover.

## 2017-05-08 NOTE — ASSESSMENT & PLAN NOTE
A1c improved with new regimens. However, his kidney function becomes impaired. We discussed to hold Synjardy and will try glipizide temporarily. He will continue Victoza for now.  After discussion with endocrinologist, endocrine recommend to cut down the dose of Synjardy from 12.5-1000 mg bid to 5/500 mg bid instead of switch to glipizide. He will follow with endocrinologist next month. He needs to schedule for retinal exam as he is due for eye exam.

## 2017-05-08 NOTE — ASSESSMENT & PLAN NOTE
Patient has positive fit test on 4/9/17. He denied abdominal pain or bloody bowel movement. He denied taking NSAIDs. He was referred to GI consultant. However, patient has not scheduled for appointment. He stated that he never have colonoscopy in his life before. Patient is advised to schedule for GI appointment. He is not anemic on recent blood test on 4/24/17.

## 2017-05-08 NOTE — MR AVS SNAPSHOT
"        Perez Leone   2017 11:00 AM   Office Visit   MRN: [id number]    Department:  [address] [place] [address]   Dept Phone:  [phone]    Description:  Male : 1955   Provider:  Elle Cooper M.D.           Reason for Visit     Diabetes lab reveiw      Allergies as of 2017     No Known Allergies      You were diagnosed with     Dyslipidemia   [964051]       Essential hypertension   [9064945]       Impaired renal function   [634006]       Positive FIT (fecal immunochemical test)   [5063208]       Uncontrolled type 2 diabetes mellitus with stage 3 chronic kidney disease, without long-term current use of insulin (CMS-[facility])   [1419490]         Vital Signs     Blood Pressure Pulse Temperature Respirations Height Weight    130/82 mmHg 84 36.7 °C (98.1 °F) 18 1.778 m (5' 10\") 127.007 kg (280 lb)    Body Mass Index Oxygen Saturation Smoking Status             40.18 kg/m2 98% Never Smoker          Basic Information     Date Of Birth Sex Race Ethnicity Preferred Language    1955 Male White Non- English      Your appointments     2017 10:40 AM   Established Patient with Gulshan Mota PA-C   [facility] ([facility])    [address]  [facility] [address] [phone]           You will be receiving a confirmation call a few days before your appointment from our automated call confirmation system.            2017 11:20 AM   Established Patient with Elle Cooper M.D.   [address] [address] [phone]           You will be receiving a confirmation call a few days before your appointment from our automated call confirmation system.              Problem List              ICD-10-CM Priority Class Noted - Resolved    History of acute anterior wall myocardial infarction I25.2   3/9/2012 - Present    CAD BMS LAD I25.10   3/9/2012 - Present    Obesity E66.9   3/9/2012 " - Present    Heme positive stool R19.5   6/8/2015 - Present    Uncontrolled type 2 diabetes mellitus with stage 3 chronic kidney disease, without long-term current use of insulin (CMS-HCC) E11.22, E11.65, N18.3   12/29/2015 - Present    Dyslipidemia E78.5   7/11/2016 - Present    Essential hypertension I10   7/11/2016 - Present    Type 2 diabetes mellitus with hyperlipidemia (CMS-HCC) E11.69, E78.5   2/13/2017 - Present    Impaired renal function N28.9   5/8/2017 - Present      Health Maintenance        Date Due Completion Dates    IMM DTaP/Tdap/Td Vaccine (1 - Tdap) 3/27/1974 ---    COLONOSCOPY 3/27/2005 ---    RETINAL SCREENING 2/15/2015 2/15/2014 (Done)    Override on 2/15/2014: Done (told no DM changes.)    IMM ZOSTER VACCINE 3/27/2015 ---    DIABETES MONOFILAMENT / LE EXAM 7/11/2017 7/11/2016, 12/29/2015    A1C SCREENING 10/24/2017 4/24/2017, 1/9/2017, 10/3/2016, 7/11/2016, 3/14/2016, 12/29/2015, 6/23/2015, 8/15/2014 (Done), 1/13/2014, 10/19/2011, 9/6/2008    Override on 8/15/2014: Done (10.8% ( done as POC))    URINE ACR / MICROALBUMIN 1/9/2018 1/9/2017, 3/14/2016, 6/23/2015, 1/13/2014    FASTING LIPID PROFILE 4/24/2018 4/24/2017, 1/9/2017, 3/14/2016, 6/23/2015, 1/13/2014, 10/23/2012    SERUM CREATININE 4/24/2018 4/24/2017, 1/9/2017, 10/3/2016, 3/14/2016, 6/23/2015, 1/13/2014, 9/6/2008            Current Immunizations     Influenza Vaccine Quad Inj (Preserved) 10/24/2016, 9/30/2015  5:18 PM, 10/9/2013, 10/7/2011    Pneumococcal polysaccharide vaccine (PPSV-23) 3/30/2016      Below and/or attached are the medications your provider expects you to take. Review all of your home medications and newly ordered medications with your provider and/or pharmacist. Follow medication instructions as directed by your provider and/or pharmacist. Please keep your medication list with you and share with your provider. Update the information when medications are discontinued, doses are changed, or new medications (including  over-the-counter products) are added; and carry medication information at all times in the event of emergency situations     Allergies:  No Known Allergies          Medications  Valid as of: May 08, 2017 - 11:39 AM    Generic Name Brand Name Tablet Size Instructions for use    Aspirin (Tablet Delayed Response) ST SANCHEZ ASPIRIN 81 MG QAM.         Atorvastatin Calcium (Tab) LIPITOR 40 MG Take 1 Tab by mouth every evening.        GlipiZIDE (Tab) GLUCOTROL 5 MG Take 1 Tab by mouth 2 times a day.        Glucose Blood (Strip) glucose blood  Use one strip to check blood sugar twice a day as needed.        Lancets (Misc) FREESTYLE LANCETS  Use one to check glucose twice a day        Liraglutide (Solution Pen-injector) VICTOZA 18 MG/3ML Inject 0.3 mL as instructed every day.        Metoprolol Succinate (TABLET SR 24 HR) TOPROL  MG Take 1 Tab by mouth every day.        Moexipril HCl (Tab) UNIVASC 7.5 MG Take 0.5 Tabs by mouth every day.        Niacin (Antihyperlipidemic) (Tab CR) NIASPAN 500 MG Take 1 Tab by mouth 3 times a day.        Nitroglycerin (SL Tab) NITROSTAT 0.4 MG Place 1 Tab under tongue as needed.        .                 Medicines prescribed today were sent to:     [facility] [address] [place] [address] (S), [place] - [address] [facility] [place] (S) [place] [address]    Phone: [phone] Fax: [phone]    Open 24 Hours?: No      Medication refill instructions:       If your prescription bottle indicates you have medication refills left, it is not necessary to call your provider’s office. Please contact your pharmacy and they will refill your medication.    If your prescription bottle indicates you do not have any refills left, you may request refills at any time through one of the following ways: The online [employer] system (except Urgent Care), by calling your provider’s office, or by asking your pharmacy to contact your provider’s office with a refill request. Medication refills are processed only during  regular business hours and may not be available until the next business day. Your provider may request additional information or to have a follow-up visit with you prior to refilling your medication.   *Please Note: Medication refills are assigned a new Rx number when refilled electronically. Your pharmacy may indicate that no refills were authorized even though a new prescription for the same medication is available at the pharmacy. Please request the medicine by name with the pharmacy before contacting your provider for a refill.        Your To Do List     Future Labs/Procedures Complete By Expires    COMP METABOLIC PANEL  As directed 5/9/2018    MICROALBUMIN CREAT RATIO URINE  As directed 5/9/2018         [employer] Access Code: Activation code not generated  Current [employer] Status: Active

## 2017-05-08 NOTE — Clinical Note
Advised patient to hold Synjardy due to abnormal renal function. Add Glipizide 5 mg bid for now. He will follow with you on 6/26/17. I advised him to repeat renal function on 6/23/17. I will follow him next month as well. Thanks!   Regards, Elle Cooper M.D.

## 2017-05-08 NOTE — ASSESSMENT & PLAN NOTE
Patient is taking atorvastatin 40 mg every evening. She deny he denied side effects from taking atorvastatin. LDL at goal. Liver enzymes are within normal.    Results for EFREN DE GUZMAN (MRN [id number]) as of 5/8/2017 13:12   Ref. Range 4/24/2017 07:26   Cholesterol,Tot Latest Ref Range: 100-199 mg/dL 111   Triglycerides Latest Ref Range: 0-149 mg/dL 99   HDL Latest Ref Range: >39 mg/dL 34 (L)   LDL Latest Ref Range: 0-99 mg/dL 57   VLDL Cholesterol Calc Latest Ref Range: 5-40 mg/dL 20   Comment: Unknown CANCELED

## 2017-05-08 NOTE — ASSESSMENT & PLAN NOTE
Patient is taking moexipril 3.75 mg daily and metoprolol  mg daily. His blood pressure is stable with current regimens. He denies side effects from taking his blood pressure medications.

## 2017-05-08 NOTE — ASSESSMENT & PLAN NOTE
Patient has increasing creatinine from 1.18 to 1.37 and decreased GFR from 60 to 55 on recent blood test on 4/24/17. He started new medication, Synjardy and Victoza for diabetes. We discussed to stop taking Synjardy for now due to impaired renal function. He will take glipizide 5 mg twice a day for now. He has follow-up appointment with endocrinologist on 6/26/17. Patient with a repeat blood test in 8 weeks.

## 2017-06-26 ENCOUNTER — OFFICE VISIT (OUTPATIENT)
Dept: ENDOCRINOLOGY | Facility: MEDICAL CENTER | Age: 62
End: 2017-06-26
Payer: MEDICARE

## 2017-06-26 VITALS
DIASTOLIC BLOOD PRESSURE: 78 MMHG | OXYGEN SATURATION: 92 % | BODY MASS INDEX: 37.11 KG/M2 | HEART RATE: 100 BPM | WEIGHT: 274 LBS | SYSTOLIC BLOOD PRESSURE: 128 MMHG | HEIGHT: 72 IN

## 2017-06-26 DIAGNOSIS — E11.69 TYPE 2 DIABETES MELLITUS WITH HYPERLIPIDEMIA (HCC): ICD-10-CM

## 2017-06-26 DIAGNOSIS — I10 ESSENTIAL HYPERTENSION: ICD-10-CM

## 2017-06-26 DIAGNOSIS — E78.5 TYPE 2 DIABETES MELLITUS WITH HYPERLIPIDEMIA (HCC): ICD-10-CM

## 2017-06-26 LAB
HBA1C MFR BLD: 6.5 % (ref ?–5.8)
INT CON NEG: NORMAL
INT CON POS: NORMAL

## 2017-06-26 PROCEDURE — 99214 OFFICE O/P EST MOD 30 MIN: CPT | Performed by: PHYSICIAN ASSISTANT

## 2017-06-26 PROCEDURE — 83036 HEMOGLOBIN GLYCOSYLATED A1C: CPT | Performed by: PHYSICIAN ASSISTANT

## 2017-06-26 RX ORDER — LIRAGLUTIDE 6 MG/ML
1.8 INJECTION SUBCUTANEOUS DAILY
Qty: 9 PEN | Refills: 3 | Status: SHIPPED | OUTPATIENT
Start: 2017-06-26 | End: 2017-10-05 | Stop reason: SDUPTHER

## 2017-06-26 NOTE — MR AVS SNAPSHOT
"        Perez Leone   2017 10:40 AM   Office Visit   MRN: [id number]    Department:  End[facility]inology [facility]   Dept Phone:  [phone]    Description:  Male : 1955   Provider:  uGlshan Mota PA-C           Allergies as of 2017     No Known Allergies      You were diagnosed with     Type 2 diabetes mellitus with hyperlipidemia (CMS-HCC)   [8699591]       Uncontrolled type 2 diabetes mellitus with stage 3 chronic kidney disease, without long-term current use of insulin (CMS-HCC)   [8378092]       Essential hypertension   [2336635]         Vital Signs     Blood Pressure Pulse Height Weight Body Mass Index Oxygen Saturation    128/78 mmHg 100 1.829 m (6' 0.01\") 124.286 kg (274 lb) 37.15 kg/m2 92%    Smoking Status                   Never Smoker            Basic Information     Date Of Birth Sex Race Ethnicity Preferred Language    1955 Male White Non- English      Your appointments     2017  4:40 PM   Established Patient with Elle Cooper M.D.   [address] [address] [phone]           You will be receiving a confirmation call a few days before your appointment from our automated call confirmation system.            Sep 25, 2017 10:40 AM   Established Patient with Gulshan Mota PA-C   [facility]    [address] [facility] [address]  [facility] [address] [phone]           You will be receiving a confirmation call a few days before your appointment from our automated call confirmation system.              Problem List              ICD-10-CM Priority Class Noted - Resolved    History of acute anterior wall myocardial infarction I25.2   3/9/2012 - Present    CAD BMS LAD I25.10   3/9/2012 - Present    Obesity E66.9   3/9/2012 - Present    Heme positive stool R19.5   2015 - Present    Uncontrolled type 2 diabetes mellitus with stage 3 chronic kidney " disease, without long-term current use of insulin (CMS-HCC) E11.22, E11.65, N18.3   12/29/2015 - Present    Dyslipidemia E78.5   7/11/2016 - Present    Essential hypertension I10   7/11/2016 - Present    Type 2 diabetes mellitus with hyperlipidemia (CMS-HCC) E11.69, E78.5   2/13/2017 - Present    Impaired renal function N28.9   5/8/2017 - Present    Positive FIT (fecal immunochemical test) R19.5   5/8/2017 - Present      Health Maintenance        Date Due Completion Dates    IMM DTaP/Tdap/Td Vaccine (1 - Tdap) 3/27/1974 ---    COLONOSCOPY 3/27/2005 ---    RETINAL SCREENING 2/15/2015 2/15/2014 (Done)    Override on 2/15/2014: Done (told no DM changes.)    IMM ZOSTER VACCINE 3/27/2015 ---    DIABETES MONOFILAMENT / LE EXAM 7/11/2017 7/11/2016, 12/29/2015    A1C SCREENING 10/24/2017 4/24/2017, 1/9/2017, 10/3/2016, 7/11/2016, 3/14/2016, 12/29/2015, 6/23/2015, 8/15/2014 (Done), 1/13/2014, 10/19/2011, 9/6/2008    Override on 8/15/2014: Done (10.8% ( done as POC))    URINE ACR / MICROALBUMIN 1/9/2018 1/9/2017, 3/14/2016, 6/23/2015, 1/13/2014    FASTING LIPID PROFILE 4/24/2018 4/24/2017, 1/9/2017, 3/14/2016, 6/23/2015, 1/13/2014, 10/23/2012    SERUM CREATININE 4/24/2018 4/24/2017, 1/9/2017, 10/3/2016, 3/14/2016, 6/23/2015, 1/13/2014, 9/6/2008            Current Immunizations     Influenza Vaccine Quad Inj (Preserved) 10/24/2016, 9/30/2015  5:18 PM, 10/9/2013, 10/7/2011    Pneumococcal polysaccharide vaccine (PPSV-23) 3/30/2016      Below and/or attached are the medications your provider expects you to take. Review all of your home medications and newly ordered medications with your provider and/or pharmacist. Follow medication instructions as directed by your provider and/or pharmacist. Please keep your medication list with you and share with your provider. Update the information when medications are discontinued, doses are changed, or new medications (including over-the-counter products) are added; and carry medication  information at all times in the event of emergency situations     Allergies:  No Known Allergies          Medications  Valid as of: June 26, 2017 - 11:00 AM    Generic Name Brand Name Tablet Size Instructions for use    Aspirin (Tablet Delayed Response) ST SANCHEZ ASPIRIN 81 MG QAM.         Atorvastatin Calcium (Tab) LIPITOR 40 MG Take 1 Tab by mouth every evening.        Empagliflozin-Metformin HCl (Tab) Empagliflozin-Metformin HCl 5-500 MG Take 1 tablet by mouth 2 Times a Day.        Glucose Blood (Strip) glucose blood  Use one strip to check blood sugar twice a day as needed.        Lancets (Misc) FREESTYLE LANCETS  Use one to check glucose twice a day        Liraglutide (Solution Pen-injector) VICTOZA 18 MG/3ML Inject 0.3 mL as instructed every day.        Metoprolol Succinate (TABLET SR 24 HR) TOPROL  MG Take 1 Tab by mouth every day.        Moexipril HCl (Tab) UNIVASC 7.5 MG Take 0.5 Tabs by mouth every day.        Niacin (Antihyperlipidemic) (Tab CR) NIASPAN 500 MG Take 1 Tab by mouth 3 times a day.        Nitroglycerin (SL Tab) NITROSTAT 0.4 MG Place 1 Tab under tongue as needed.        .                 Medicines prescribed today were sent to:     [facility] [address] [place] [address] (S), [place] [address] [facility] (S) [place] [address]    Phone: [phone] Fax: [phone]    Open 24 Hours?: No      Medication refill instructions:       If your prescription bottle indicates you have medication refills left, it is not necessary to call your provider’s office. Please contact your pharmacy and they will refill your medication.    If your prescription bottle indicates you do not have any refills left, you may request refills at any time through one of the following ways: The online [employer] system (except Urgent Care), by calling your provider’s office, or by asking your pharmacy to contact your provider’s office with a refill request. Medication refills are processed only during regular business  hours and may not be available until the next business day. Your provider may request additional information or to have a follow-up visit with you prior to refilling your medication.   *Please Note: Medication refills are assigned a new Rx number when refilled electronically. Your pharmacy may indicate that no refills were authorized even though a new prescription for the same medication is available at the pharmacy. Please request the medicine by name with the pharmacy before contacting your provider for a refill.           [email or web address] Access Code: Activation code not generated  Current [employer] Status: Active

## 2017-06-26 NOTE — PROGRESS NOTES
Return to office Patient Consult Note  Referred by: Elle Cooper M.D.    Reason for consult: Diabetes Management Type 2    HPI:  Perez Leone is a 62 y.o. old patient who is seeing us today for diabetes care.  This is a pleasant patient with diabetes and I appreciate the opportunity to participate in the care of this patient.    HbA1c in the office today is 6.5    BG Diary:6/26/2017  In the AM: Did not bring  States 100-120    BG Diary: 3/22/17  In the AM: 131, 136, 152, 131, 112, 130, 136, 119, 128  Before meal:  Before Bed: 143, 145, 106, 134, 125, 136, 130, 125    BG Diary: 3/1/17  In the AM: 145, 165, 147, 161, 154, 152, 157  Before meal: 155, 160, 150, 163, 160, 164  Before Bed:144, 167, 202, 114, 181, 175    Weight: He was on 2/13/17 306 pounds and today he is 274    Labs of 1/9/2017 he has a Cr. Of 1.1, HbA1c of 8.1, GFR 66, LDL 75, HDL 34,     He had previously seen Rubén Ambrosio as his Endo for years.  In the last 3 months I have seen him his HbA1c is down to 6.5 and he has lost 32 pounds.        1. Type 2 diabetes mellitus with hyperlipidemia (CMS-HCC)  I STARTED: on 2/13/17  1.   Victoza 1.8 injection at night    2.   Synjardy 12.5/1000 one in AM one in PM  3.   Actos 15mg qday start on 3/1/17    He is doing very well,  He is off insulin has lost 32 pounds in three month his BG numbers are perfect.     His BP is also becoming better and we will have to consider decreasing BP meds.     2. Uncontrolled type 2 diabetes mellitus with stage 3 chronic kidney disease, without long-term current use of insulin (CMS-HCC)  The total time spent seeing this patient today face to face in consultation, and formulating an action plan for this visit was greater than 25 minutes. > Than 50% of this time was spent counseling, discussing problems documented above and below, coordinating care and answering questions by the physician assistant.  We developed a diabetes care plan for this patient today.      3. Essential  hypertension  Doing very well    ROS:   Constitutional: No night sweats.  Eyes:  No visual changes.  Cardiac: No chest pain, No palpitations or racing heart rate.  Resp: No shortness of breath, No cough,   Gi: No Diarrhea    All other systems were reviewed and were/are negative.  The ROS was revised/revisited during this office visit from the patients first office visit with me on 2/13/17. Please review the full ROS during the first office visit.    Past Medical History:  Patient Active Problem List    Diagnosis Date Noted   • Impaired renal function 05/08/2017   • Positive FIT (fecal immunochemical test) 05/08/2017   • Type 2 diabetes mellitus with hyperlipidemia (CMS-HCC) 02/13/2017   • Dyslipidemia 07/11/2016   • Essential hypertension 07/11/2016   • Uncontrolled type 2 diabetes mellitus with stage 3 chronic kidney disease, without long-term current use of insulin (CMS-HCC) 12/29/2015   • Heme positive stool 06/08/2015   • History of acute anterior wall myocardial infarction 03/09/2012   • CAD BMS LAD 03/09/2012   • Obesity 03/09/2012       Past Surgical History:  No past surgical history on file.    Allergies:  Review of patient's allergies indicates no known allergies.    Social History:  Social History     Social History   • Marital Status: Single     Spouse Name: N/A   • Number of Children: N/A   • Years of Education: N/A     Occupational History   • Not on file.     Social History Main Topics   • Smoking status: Never Smoker    • Smokeless tobacco: Never Used   • Alcohol Use: No   • Drug Use: No   • Sexual Activity: No     Other Topics Concern   • Not on file     Social History Narrative    Single    Works at the [employer] as a        Family History:  Family History   Problem Relation Age of Onset   • Heart Disease Mother      CHF   • Diabetes Brother    • Other Father      son does not father's med hx       Medications:    Current outpatient prescriptions:   •   "Empagliflozin-Metformin HCl 5-500 MG Tab, Take 1 tablet by mouth 2 Times a Day., Disp: 60 Tab, Rfl: 11  •  VICTOZA 18 MG/3ML Solution Pen-injector injection, Inject 0.3 mL as instructed every day., Disp: 3 PEN, Rfl: 6  •  glucose blood (FREESTYLE LITE) strip, Use one strip to check blood sugar twice a day as needed., Disp: 200 Strip, Rfl: 3  •  FREESTYLE LANCETS Misc, Use one to check glucose twice a day, Disp: 100 Each, Rfl: 3  •  metoprolol SR (TOPROL XL) 100 MG TABLET SR 24 HR, Take 1 Tab by mouth every day., Disp: 90 Tab, Rfl: 3  •  moexipril (UNIVASC) 7.5 MG Tab, Take 0.5 Tabs by mouth every day., Disp: 45 Tab, Rfl: 3  •  nitroglycerin (NITROSTAT) 0.4 MG SL Tab, Place 1 Tab under tongue as needed., Disp: 10 Tab, Rfl: 0  •  atorvastatin (LIPITOR) 40 MG TABS, Take 1 Tab by mouth every evening., Disp: 90 Each, Rfl: 1  •  niacin SR (NIASPAN) 500 MG TBCR, Take 1 Tab by mouth 3 times a day., Disp: 90 Each, Rfl: 6  •  Psychiatric ASPIRIN 81 MG PO TBEC, QAM. , Disp: , Rfl:         Physical Examination:   Vital signs: /78 mmHg  Pulse 100  Ht 1.829 m (6' 0.01\")  Wt 124.286 kg (274 lb)  BMI 37.15 kg/m2  SpO2 92%  General: No distress, cooperative, well dressed and well nourished.   Eyes: No scleral icterus or discharge, No hyposphagma  ENMT: Normal on external inspection of nose, lips, No nasal drainage   Neck: No abnormal masses on inspection  Resp: Normal effort, Bilateral clear to auscultation, No wheezing, No rales  CVS: Regular rate and rhythm, S1 S2 normal, No murmur. No gallop  Extremities: No edema bilateral extremities  Neuro: Alert and oriented  Skin: No rash, No Ulcers  Psych: Normal mood and affect      Assessment and Plan:    1. Type 2 diabetes mellitus with hyperlipidemia (CMS-HCC)  I STARTED: on 2/13/17  1.   Victoza 1.8 injection at night    2.   Synjardy 12.5/1000 one in AM one in PM  3.   Actos 15mg qday start on 3/1/17    He is doing very well,  He is off insulin has lost 32 pounds in three " month his BG numbers are perfect.     His BP is also becoming better and we will have to consider decreasing BP meds.       2. Uncontrolled type 2 diabetes mellitus with stage 3 chronic kidney disease, without long-term current use of insulin (CMS-[facility])  This is stable and he did new labs today. Seeing PCP next week    He had previously seen Rubén Ambrosio as his Endo for years.  In the last 3 months I have seen him his HbA1c is down to 6.5 and he has lost 32 pounds.        3. Essential hypertension  Doing very well      Return in about 3 months (around 9/26/2017).    Blood glucose log: Check BG in the morning when wake up, before lunch or dinner and before bed.  So three times a day.  Always bring BG diary to the next office visit.     Thank you kindly for allowing me to participate in the diabetes care plan for this patient.    Vish Mota PA-C, BC-ADM  06/26/2017    CC:   Elle Cooper M.D.

## 2017-06-27 LAB
ALBUMIN SERPL-MCNC: 3.8 G/DL (ref 3.6–4.8)
ALBUMIN/CREAT UR: <3.2 MG/G CREAT (ref 0–30)
ALBUMIN/GLOB SERPL: 1.5 {RATIO} (ref 1.2–2.2)
ALP SERPL-CCNC: 82 IU/L (ref 39–117)
ALT SERPL-CCNC: 17 IU/L (ref 0–44)
AST SERPL-CCNC: 13 IU/L (ref 0–40)
BILIRUB SERPL-MCNC: 0.7 MG/DL (ref 0–1.2)
BUN SERPL-MCNC: 19 MG/DL (ref 8–27)
BUN/CREAT SERPL: 15 (ref 10–24)
CALCIUM SERPL-MCNC: 9.4 MG/DL (ref 8.6–10.2)
CHLORIDE SERPL-SCNC: 102 MMOL/L (ref 96–106)
CO2 SERPL-SCNC: 19 MMOL/L (ref 18–29)
CREAT SERPL-MCNC: 1.25 MG/DL (ref 0.76–1.27)
CREAT UR-MCNC: 93.5 MG/DL
GLOBULIN SER CALC-MCNC: 2.5 G/DL (ref 1.5–4.5)
GLUCOSE SERPL-MCNC: 119 MG/DL (ref 65–99)
MICROALBUMIN UR-MCNC: <3 UG/ML
POTASSIUM SERPL-SCNC: 4.4 MMOL/L (ref 3.5–5.2)
PROT SERPL-MCNC: 6.3 G/DL (ref 6–8.5)
SODIUM SERPL-SCNC: 137 MMOL/L (ref 134–144)

## 2017-06-29 ENCOUNTER — TELEPHONE (OUTPATIENT)
Dept: MEDICAL GROUP | Age: 62
End: 2017-06-29

## 2017-06-29 NOTE — TELEPHONE ENCOUNTER
ESTABLISHED PATIENT PRE-VISIT PLANNING     Note: Patient will not be contacted if there is no indication to call.     1.  Reviewed notes from the last few office visits within the medical group: Yes    2.  If any orders were placed at last visit or intended to be done for this visit (i.e. 6 mos follow-up), do we have Results/Consult Notes?        •  Labs - Labs were not ordered at last office visit.       •  Imaging - Imaging was not ordered at last office visit.       •  Referrals - No referrals were ordered at last office visit.    3. Is this appointment scheduled as a Hospital Follow-Up? No    4.  Immunizations were updated in Epic using WebIZ?: No WebIZ record       •  Web Iz Recommendations:     5.  Patient is due for the following Health Maintenance Topics:   Health Maintenance Due   Topic Date Due   • IMM DTaP/Tdap/Td Vaccine (1 - Tdap) 03/27/1974   • COLONOSCOPY  03/27/2005   • RETINAL SCREENING  02/15/2015   • IMM ZOSTER VACCINE  03/27/2015           6.  Patient was NOT informed to arrive 15 min prior to their scheduled appointment and bring in their medication bottles.

## 2017-07-03 ENCOUNTER — OFFICE VISIT (OUTPATIENT)
Dept: MEDICAL GROUP | Age: 62
End: 2017-07-03
Payer: MEDICARE

## 2017-07-03 VITALS
WEIGHT: 275.4 LBS | SYSTOLIC BLOOD PRESSURE: 128 MMHG | BODY MASS INDEX: 37.3 KG/M2 | DIASTOLIC BLOOD PRESSURE: 78 MMHG | HEIGHT: 72 IN | RESPIRATION RATE: 18 BRPM | OXYGEN SATURATION: 96 % | HEART RATE: 85 BPM | TEMPERATURE: 98.8 F

## 2017-07-03 DIAGNOSIS — E78.5 DYSLIPIDEMIA: ICD-10-CM

## 2017-07-03 DIAGNOSIS — R19.5 POSITIVE FIT (FECAL IMMUNOCHEMICAL TEST): ICD-10-CM

## 2017-07-03 DIAGNOSIS — E11.69 TYPE 2 DIABETES MELLITUS WITH HYPERLIPIDEMIA (HCC): ICD-10-CM

## 2017-07-03 DIAGNOSIS — E78.5 TYPE 2 DIABETES MELLITUS WITH HYPERLIPIDEMIA (HCC): ICD-10-CM

## 2017-07-03 DIAGNOSIS — I10 ESSENTIAL HYPERTENSION: ICD-10-CM

## 2017-07-03 PROCEDURE — 99214 OFFICE O/P EST MOD 30 MIN: CPT | Performed by: INTERNAL MEDICINE

## 2017-07-03 NOTE — MR AVS SNAPSHOT
Perez Leone   7/3/2017 4:40 PM   Office Visit   MRN: [id number]    Department:  [address] [place] [address]   Dept Phone:  [phone]    Description:  Male : 1955   Provider:  Elle Cooper M.D.           Reason for Visit     Results labs      Allergies as of 7/3/2017     No Known Allergies      You were diagnosed with     Type 2 diabetes mellitus with hyperlipidemia (CMS-HCC)   [5872747]       Positive FIT (fecal immunochemical test)   [9020549]       Essential hypertension   [7049518]       Dyslipidemia   [185115]         Vital Signs     Blood Pressure Pulse Temperature Respirations Height Weight    128/78 mmHg 85 37.1 °C (98.8 °F) 18 1.829 m (6') 124.921 kg (275 lb 6.4 oz)    Body Mass Index Oxygen Saturation Smoking Status             37.34 kg/m2 96% Never Smoker          Basic Information     Date Of Birth Sex Race Ethnicity Preferred Language    1955 Male White Non- English      Your appointments     Sep 25, 2017 10:40 AM   Established Patient with Gulshan Mota PA-C   [facility] ([facility])    [id number], [address]  [facility] [address] [phone]           You will be receiving a confirmation call a few days before your appointment from our automated call confirmation system.              Problem List              ICD-10-CM Priority Class Noted - Resolved    History of acute anterior wall myocardial infarction I25.2   3/9/2012 - Present    CAD BMS LAD I25.10   3/9/2012 - Present    Obesity E66.9   3/9/2012 - Present    Heme positive stool R19.5   2015 - Present    Uncontrolled type 2 diabetes mellitus with stage 3 chronic kidney disease, without long-term current use of insulin (CMS-HCC) E11.22, E11.65, N18.3   2015 - Present    Dyslipidemia E78.5   2016 - Present    Essential hypertension I10   2016 - Present    Type 2 diabetes mellitus with hyperlipidemia (CMS-HCC) E11.69, E78.5   2017 - Present    Impaired renal function N28.9   5/8/2017 - Present    Positive FIT (fecal immunochemical test) R19.5   5/8/2017 - Present      Health Maintenance        Date Due Completion Dates    IMM DTaP/Tdap/Td Vaccine (1 - Tdap) 3/27/1974 ---    COLONOSCOPY 3/27/2005 ---    RETINAL SCREENING 2/15/2015 2/15/2014 (Done)    Override on 2/15/2014: Done (told no DM changes.)    IMM ZOSTER VACCINE 3/27/2015 ---    DIABETES MONOFILAMENT / LE EXAM 7/11/2017 7/11/2016, 12/29/2015    IMM INFLUENZA (1) 9/1/2017 10/24/2016, 9/30/2015, 10/9/2013, 10/7/2011    A1C SCREENING 12/26/2017 6/26/2017, 4/24/2017, 1/9/2017, 10/3/2016, 7/11/2016, 3/14/2016, 12/29/2015, 6/23/2015, 8/15/2014 (Done), 1/13/2014, 10/19/2011, 9/6/2008    Override on 8/15/2014: Done (10.8% ( done as POC))    FASTING LIPID PROFILE 4/24/2018 4/24/2017, 1/9/2017, 3/14/2016, 6/23/2015, 1/13/2014, 10/23/2012    URINE ACR / MICROALBUMIN 6/26/2018 6/26/2017, 1/9/2017, 3/14/2016, 6/23/2015, 1/13/2014    SERUM CREATININE 6/26/2018 6/26/2017, 4/24/2017, 1/9/2017, 10/3/2016, 3/14/2016, 6/23/2015, 1/13/2014, 9/6/2008            Current Immunizations     Influenza Vaccine Quad Inj (Preserved) 10/24/2016, 9/30/2015  5:18 PM, 10/9/2013, 10/7/2011    Pneumococcal polysaccharide vaccine (PPSV-23) 3/30/2016      Below and/or attached are the medications your provider expects you to take. Review all of your home medications and newly ordered medications with your provider and/or pharmacist. Follow medication instructions as directed by your provider and/or pharmacist. Please keep your medication list with you and share with your provider. Update the information when medications are discontinued, doses are changed, or new medications (including over-the-counter products) are added; and carry medication information at all times in the event of emergency situations     Allergies:  No Known Allergies          Medications  Valid as of: July 03, 2017 -  5:24 PM    Generic Name Brand Name Tablet  Size Instructions for use    Aspirin (Tablet Delayed Response) [facility] ASPIRIN 81 MG QAM.         Atorvastatin Calcium (Tab) LIPITOR 40 MG Take 1 Tab by mouth every evening.        Empagliflozin-Metformin HCl (Tab) Empagliflozin-Metformin HCl 5-500 MG Take 1 tablet by mouth 2 Times a Day.        Glucose Blood (Strip) glucose blood  Use one strip to check blood sugar twice a day as needed.        Lancets (Misc) FREESTYLE LANCETS  Use one to check glucose twice a day        Liraglutide (Solution Pen-injector) VICTOZA 18 MG/3ML Inject 0.3 mL as instructed every day.        Metoprolol Succinate (TABLET SR 24 HR) TOPROL  MG Take 1 Tab by mouth every day.        Moexipril HCl (Tab) UNIVASC 7.5 MG Take 0.5 Tabs by mouth every day.        Niacin (Antihyperlipidemic) (Tab CR) NIASPAN 500 MG Take 1 Tab by mouth 3 times a day.        Nitroglycerin (SL Tab) NITROSTAT 0.4 MG Place 1 Tab under tongue as needed.        .                 Medicines prescribed today were sent to:     [facility] [address] [place] [address] (S), [place] [id number]    [facility] [place] [address]    Phone: [phone] Fax: [phone]    Open 24 Hours?: No      Medication refill instructions:       If your prescription bottle indicates you have medication refills left, it is not necessary to call your provider’s office. Please contact your pharmacy and they will refill your medication.    If your prescription bottle indicates you do not have any refills left, you may request refills at any time through one of the following ways: The online [employer] system (except Urgent Care), by calling your provider’s office, or by asking your pharmacy to contact your provider’s office with a refill request. Medication refills are processed only during regular business hours and may not be available until the next business day. Your provider may request additional information or to have a follow-up visit with you prior to refilling your medication.      *Please Note: Medication refills are assigned a new Rx number when refilled electronically. Your pharmacy may indicate that no refills were authorized even though a new prescription for the same medication is available at the pharmacy. Please request the medicine by name with the pharmacy before contacting your provider for a refill.        Your To Do List     Future Labs/Procedures Complete By Expires    CBC WITH DIFFERENTIAL  As directed 7/4/2018    COMP METABOLIC PANEL  As directed 7/4/2018    HEMOGLOBIN A1C  As directed 7/4/2018    LIPID PROFILE  As directed 7/4/2018      Referral     A referral request has been sent to our patient care coordination department. Please allow 3-5 business days for us to process this request and contact you either by phone or mail. If you do not hear from us by the 5th business day, please call us at [phone].           [employer] Access Code: Activation code not generated  Current [employer] Status: Active

## 2017-07-04 NOTE — ASSESSMENT & PLAN NOTE
Patient is taking metoprolol  mg daily and moexipril 3.75 mg daily. He denies side effects from taking current medications. He follows with cardiologist as well.

## 2017-07-04 NOTE — ASSESSMENT & PLAN NOTE
Patient has positive stool fit test on 4/9/17. He does not have any abdominal pain or bloody bowel movement or dizziness or chest pain or palpitation. He does not take NSAIDs. He has never had colonoscopy in the past. I referred him to GI consultant in January 2017 and he has not scheduled with them.

## 2017-07-04 NOTE — ASSESSMENT & PLAN NOTE
His blood sugar improved with current regimens. Patient is taking Actos or 1.8 mg daily, Synjardy 5/500 mg twice a day. He denies side effects from taking his current medications and denies hypoglycemia. His A1c was 6.5 on 6/26/17. He is overdue for eye exam. He agrees to refer to ophthalmologist.

## 2017-07-04 NOTE — ASSESSMENT & PLAN NOTE
Patient is taking Lipitor 40 mg every evening, and niacin 500 mg 3 times a day as prescribed by his cardiologist. His LDL at goal. He denies side effects from taking Lipitor and niacin. His liver enzymes are within normal. Reviewed recent blood test with patient today.    Results for EFREN DE GUZMAN (MRN [id number]) as of 7/3/2017 18:59   Ref. Range 1/9/2017 07:34 4/24/2017 07:26   Cholesterol,Tot Latest Ref Range: 100-199 mg/dL 127 111   Triglycerides Latest Ref Range: 0-149 mg/dL 90 99   HDL Latest Ref Range: >39 mg/dL 34 (L) 34 (L)   LDL Latest Ref Range: 0-99 mg/dL 75 57   VLDL Cholesterol Calc Latest Ref Range: 5-40 mg/dL 18 20

## 2017-07-04 NOTE — PROGRESS NOTES
Subjective:   Perez De Guzman is a 62 y.o. male here today for evaluation and management of:      Type 2 diabetes mellitus with hyperlipidemia (CMS-[facility])  His blood sugar improved with current regimens. Patient is taking Actos or 1.8 mg daily, Synjardy 5/500 mg twice a day. He denies side effects from taking his current medications and denies hypoglycemia. His A1c was 6.5 on 6/26/17. He is overdue for eye exam. He agrees to refer to ophthalmologist.    Positive FIT (fecal immunochemical test)  Patient has positive stool fit test on 4/9/17. He does not have any abdominal pain or bloody bowel movement or dizziness or chest pain or palpitation. He does not take NSAIDs. He has never had colonoscopy in the past. I referred him to GI consultant in January 2017 and he has not scheduled with them.    Essential hypertension  Patient is taking metoprolol  mg daily and moexipril 3.75 mg daily. He denies side effects from taking current medications. He follows with cardiologist as well.    Dyslipidemia  Patient is taking Lipitor 40 mg every evening, and niacin 500 mg 3 times a day as prescribed by his cardiologist. His LDL at goal. He denies side effects from taking Lipitor and niacin. His liver enzymes are within normal. Reviewed recent blood test with patient today.    Results for PEREZ DE GUZMAN (MRN [id number]) as of 7/3/2017 18:59   Ref. Range 1/9/2017 07:34 4/24/2017 07:26   Cholesterol,Tot Latest Ref Range: 100-199 mg/dL 127 111   Triglycerides Latest Ref Range: 0-149 mg/dL 90 99   HDL Latest Ref Range: >39 mg/dL 34 (L) 34 (L)   LDL Latest Ref Range: 0-99 mg/dL 75 57   VLDL Cholesterol Calc Latest Ref Range: 5-40 mg/dL 18 20          Current medicines (including changes today)  Current Outpatient Prescriptions   Medication Sig Dispense Refill   • Empagliflozin-Metformin HCl 5-500 MG Tab Take 1 tablet by mouth 2 Times a Day. 180 Tab 3   • VICTOZA 18 MG/3ML Solution Pen-injector injection Inject 0.3 mL as instructed  every day. 9 PEN 3   • glucose blood (FREESTYLE LITE) strip Use one strip to check blood sugar twice a day as needed. 200 Strip 3   • FREESTYLE LANCETS Misc Use one to check glucose twice a day 100 Each 3   • metoprolol SR (TOPROL XL) 100 MG TABLET SR 24 HR Take 1 Tab by mouth every day. 90 Tab 3   • moexipril (UNIVASC) 7.5 MG Tab Take 0.5 Tabs by mouth every day. 45 Tab 3   • nitroglycerin (NITROSTAT) 0.4 MG SL Tab Place 1 Tab under tongue as needed. 10 Tab 0   • atorvastatin (LIPITOR) 40 MG TABS Take 1 Tab by mouth every evening. 90 Each 1   • niacin SR (NIASPAN) 500 MG TBCR Take 1 Tab by mouth 3 times a day. 90 Each 6   • [facility] ASPIRIN 81 MG PO TBEC QAM.        No current facility-administered medications for this visit.     He  has a past medical history of Overweight and obesity; Pure hypercholesterolemia; Acute MI (CMS-HCC); Diabetes mellitus; Hypertension; and Heme positive stool (6/8/2015).    ROS   No chest pain, no shortness of breath, no abdominal pain       Objective:     Blood pressure 128/78, pulse 85, temperature 37.1 °C (98.8 °F), resp. rate 18, height 1.829 m (6'), weight 124.921 kg (275 lb 6.4 oz), SpO2 96 %. Body mass index is 37.34 kg/(m^2).   Physical Exam:  General: Alert, oriented and no acute distress.  Eye contact is good, speech goal directed, affect calm  HEENT: conjunctiva non-injected, sclera non-icteric.  Oral mucous membranes pink and moist with no lesions.  Pinna normal.   Lungs: Normal respiratory effort, clear to auscultation bilaterally with good excursion.  CV: regular rate and rhythm. No murmurs.  Abdomen: soft, non distended, nontender, Bowel sound normal.  Ext: no edema, color normal, vascularity normal, temperature normal        Assessment and Plan:   The following treatment plan was discussed     1. Type 2 diabetes mellitus with hyperlipidemia (CMS-HCC)  - Improved. A1c at goal. Continue current regimens. Repeat blood tests in 6 months.  - Referred to ophthalmologist for  retinal exam.  - Counseled to comply with medication and diet.   - Counseled signs and symptoms of hypoglycemia and management of hypoglycemia.   - Recommend to have retinal eye exam once a year.  - Advised to check both feet daily.   - REFERRAL TO OPHTHALMOLOGY  - COMP METABOLIC PANEL; Future  - HEMOGLOBIN A1C; Future    2. Positive FIT (fecal immunochemical test)  - Discussed at length with patient important to follow with GI for heme positive stool test including risk of cancer. He has never had colonoscopy in the past. He agreed to follow with GI for further workup.  - He does not have any signs and symptoms of anemia. Patient is advised to seek urgent medical attention if he noted blood in stool or having abdominal pain or having signs and symptoms of anemia. Patient understands and agrees.   - REFERRAL TO GASTROENTEROLOGY    3. Essential hypertension  - Well-controlled. Continue current regimens. Recheck lab 1-2 weeks before next follow up visit.  - Recommend to monitor blood pressure and heart rate at home.  - CBC WITH DIFFERENTIAL; Future  - COMP METABOLIC PANEL; Future    4. Dyslipidemia  - Well-controlled. Continue current regimens. Recheck lab 1-2 weeks before next follow up visit.  - Advised to eat low fat, low carbohydrate and high fiber diet as well as do cardio physical exercise regularly to lose weight.  - COMP METABOLIC PANEL; Future  - LIPID PROFILE; Future    5. Health Maintenance  - Discussed to have tetanus vaccine, shingles vaccine with patient today. He wants to postpone the vaccines to future.   - Advised to follow with GI for colon cancer screening and further workup for heme-positive stool.    Followup: Return in about 6 months (around 1/3/2018), or if symptoms worsen or fail to improve, for diabetes, hypertension, hyperlipidemia, lab review.      Please note that this dictation was created using voice recognition software. I have made every reasonable attempt to correct obvious errors, but  I expect that there may have unintended errors in text, spelling, punctuation, or grammar that I did not discover.

## 2017-09-25 ENCOUNTER — OFFICE VISIT (OUTPATIENT)
Dept: ENDOCRINOLOGY | Facility: MEDICAL CENTER | Age: 62
End: 2017-09-25
Payer: MEDICARE

## 2017-09-25 VITALS
HEART RATE: 107 BPM | SYSTOLIC BLOOD PRESSURE: 120 MMHG | DIASTOLIC BLOOD PRESSURE: 76 MMHG | WEIGHT: 272 LBS | HEIGHT: 72 IN | BODY MASS INDEX: 36.84 KG/M2 | OXYGEN SATURATION: 97 %

## 2017-09-25 DIAGNOSIS — E11.69 TYPE 2 DIABETES MELLITUS WITH HYPERLIPIDEMIA (HCC): ICD-10-CM

## 2017-09-25 DIAGNOSIS — E78.5 TYPE 2 DIABETES MELLITUS WITH HYPERLIPIDEMIA (HCC): ICD-10-CM

## 2017-09-25 LAB
HBA1C MFR BLD: 6.6 % (ref ?–5.8)
INT CON NEG: NORMAL
INT CON POS: NORMAL

## 2017-09-25 PROCEDURE — 99214 OFFICE O/P EST MOD 30 MIN: CPT | Performed by: PHYSICIAN ASSISTANT

## 2017-09-25 PROCEDURE — 83036 HEMOGLOBIN GLYCOSYLATED A1C: CPT | Performed by: PHYSICIAN ASSISTANT

## 2017-09-25 NOTE — PROGRESS NOTES
Return to office Patient Consult Note  Referred by: Elle Cooper M.D.    Reason for consult: Diabetes Management Type 2    HPI:  Perez Leone is a 62 y.o. old patient who is seeing us today for diabetes care.  This is a pleasant patient with diabetes and I appreciate the opportunity to participate in the care of this patient.    BG Diary:9/25/2017  In the AM: 109, 105, 120, 126, 109, 122, 14  Before meal:  Before Bed: 167, 155, 160, 60, 151, 147, 155     HbA1c in the office 6/26/17  is 6.5  Labs of 1/9/2017 he has a Cr. Of 1.1, HbA1c of 8.1, GFR 66, LDL 75, HDL 34,      BG Diary:6/26/2017  In the AM: Did not bring  States 100-120     BG Diary: 3/22/17  In the AM: 131, 136, 152, 131, 112, 130, 136, 119, 128  Before meal:  Before Bed: 143, 145, 106, 134, 125, 136, 130, 125     BG Diary: 3/1/17  In the AM: 145, 165, 147, 161, 154, 152, 157  Before meal: 155, 160, 150, 163, 160, 164  Before Bed:144, 167, 202, 114, 181, 175     Weight: He was on 2/13/17 306 pounds and today he is 272       1. Uncontrolled type 2 diabetes mellitus with stage 3 chronic kidney disease, without long-term current use of insulin (CMS-[facility])    He had previously seen Rubén Ambrosio as his Endo for years.  In the last 3 months I have seen him his HbA1c is down to 6.5 and again 3 months later and he is 6.6 and has lost 34 pounds.      2. Type 2 diabetes mellitus with hyperlipidemia (CMS-HCC)    I STARTED: on 2/13/17  1.   Victoza 1.8 injection at night    2.   Synjardy 12.5/1000 one in AM one in PM  3.   Actos 15mg qday start on 3/1/17     He is doing very well,  He is off insulin has lost 32 pounds in three month his BG numbers are perfect.      His BP is also becoming better and we will have to consider decreasing BP meds.       ROS:   Constitutional: No night sweats.  Eyes:  No visual changes.  Cardiac: No chest pain, No palpitations or racing heart rate.  Resp: No shortness of breath, No cough,   Gi: No Diarrhea    All other systems were  reviewed and were/are negative.  The ROS was revised/revisited during this office visit from the patients first office visit with me on 2/13/17. Please review the full ROS during the first office visit    Past Medical History:  Patient Active Problem List    Diagnosis Date Noted   • Impaired renal function 05/08/2017   • Positive FIT (fecal immunochemical test) 05/08/2017   • Type 2 diabetes mellitus with hyperlipidemia (CMS-HCC) 02/13/2017   • Dyslipidemia 07/11/2016   • Essential hypertension 07/11/2016   • Uncontrolled type 2 diabetes mellitus with stage 3 chronic kidney disease, without long-term current use of insulin (CMS-HCC) 12/29/2015   • Heme positive stool 06/08/2015   • History of acute anterior wall myocardial infarction 03/09/2012   • CAD BMS LAD 03/09/2012   • Obesity 03/09/2012       Past Surgical History:  No past surgical history on file.    Allergies:  Review of patient's allergies indicates no known allergies.    Social History:  Social History     Social History   • Marital status: Single     Spouse name: N/A   • Number of children: N/A   • Years of education: N/A     Occupational History   • Not on file.     Social History Main Topics   • Smoking status: Never Smoker   • Smokeless tobacco: Never Used   • Alcohol use No   • Drug use: No   • Sexual activity: No     Other Topics Concern   • Not on file     Social History Narrative    Single    Works at the [employer] as a        Family History:  Family History   Problem Relation Age of Onset   • Heart Disease Mother      CHF   • Diabetes Brother    • Other Father      son does not father's med hx       Medications:    Current Outpatient Prescriptions:   •  Canagliflozin-Metformin HCl ER (INVOKAMET XR)  MG TABLET SR 24 HR, Take 1 Tab by mouth 2 Times a Day., Disp: 60 Tab, Rfl: 11  •  Empagliflozin-Metformin HCl 5-500 MG Tab, Take 1 tablet by mouth 2 Times a Day., Disp: 180 Tab, Rfl: 3  •  VICTOZA 18 MG/3ML Solution  Pen-injector injection, Inject 0.3 mL as instructed every day., Disp: 9 PEN, Rfl: 3  •  glucose blood (FREESTYLE LITE) strip, Use one strip to check blood sugar twice a day as needed., Disp: 200 Strip, Rfl: 3  •  FREESTYLE LANCETS Misc, Use one to check glucose twice a day, Disp: 100 Each, Rfl: 3  •  metoprolol SR (TOPROL XL) 100 MG TABLET SR 24 HR, Take 1 Tab by mouth every day., Disp: 90 Tab, Rfl: 3  •  moexipril (UNIVASC) 7.5 MG Tab, Take 0.5 Tabs by mouth every day., Disp: 45 Tab, Rfl: 3  •  nitroglycerin (NITROSTAT) 0.4 MG SL Tab, Place 1 Tab under tongue as needed., Disp: 10 Tab, Rfl: 0  •  atorvastatin (LIPITOR) 40 MG TABS, Take 1 Tab by mouth every evening., Disp: 90 Each, Rfl: 1  •  niacin SR (NIASPAN) 500 MG TBCR, Take 1 Tab by mouth 3 times a day., Disp: 90 Each, Rfl: 6  •  [facility] ASPIRIN 81 MG PO TBEC, QAM. , Disp: , Rfl:         Physical Examination:   Vital signs: /76   Pulse (!) 107   Ht 1.829 m (6')   Wt 123.4 kg (272 lb)   SpO2 97%   BMI 36.89 kg/m²   General: No distress, cooperative, well dressed and well nourished.   Eyes: No scleral icterus or discharge, No hyposphagma  ENMT: Normal on external inspection of nose, lips, No nasal drainage   Neck: No abnormal masses on inspection  Resp: Normal effort, Bilateral clear to auscultation, No wheezing, No rales  CVS: Regular rate and rhythm, S1 S2 normal, No murmur. No gallop  Extremities: No edema bilateral extremities  Neuro: Alert and oriented  Skin: No rash, No Ulcers  Psych: Normal mood and affect      Assessment and Plan:    1. Uncontrolled type 2 diabetes mellitus with stage 3 chronic kidney disease, without long-term current use of insulin (CMS-[facility])    I STARTED: on 2/13/17  1.   Victoza 1.8 injection at night    2.   Synjardy 12.5/1000 one in AM one in PM  3.   Actos 15mg qday start on 3/1/17    2. Type 2 diabetes mellitus with hyperlipidemia (CMS-HCC)      Return in about 3 months (around 12/25/2017).    Blood glucose log: Check  BG in the morning when wake up, before lunch or dinner and before bed.  So three times a day.  Always bring BG diary to the next office visit.       Thank you kindly for allowing me to participate in the diabetes care plan for this patient.    Vish Mota PA-C, BC-[facility]  Board Certified - Advanced Diabetes Management  09/25/17    CC:   Elle Cooper M.D.

## 2017-10-05 RX ORDER — LIRAGLUTIDE 6 MG/ML
1.8 INJECTION SUBCUTANEOUS DAILY
Qty: 9 PEN | Refills: 3 | Status: SHIPPED | OUTPATIENT
Start: 2017-10-05 | End: 2018-08-02 | Stop reason: SDUPTHER

## 2017-10-08 DIAGNOSIS — E11.00 UNCONTROLLED TYPE 2 DIABETES MELLITUS WITH HYPEROSMOLARITY WITHOUT COMA, UNSPECIFIED LONG TERM INSULIN USE STATUS: ICD-10-CM

## 2017-10-09 RX ORDER — LANCETS 28 GAUGE
EACH MISCELLANEOUS
Qty: 100 EACH | Refills: 3 | Status: SHIPPED
Start: 2017-10-09 | End: 2018-05-06 | Stop reason: SDUPTHER

## 2017-10-11 DIAGNOSIS — I25.10 CORONARY ARTERY DISEASE INVOLVING NATIVE CORONARY ARTERY OF NATIVE HEART WITHOUT ANGINA PECTORIS: ICD-10-CM

## 2017-10-11 RX ORDER — NITROGLYCERIN 0.4 MG/1
TABLET SUBLINGUAL
Qty: 25 TAB | Refills: 0 | Status: SHIPPED | OUTPATIENT
Start: 2017-10-11 | End: 2018-09-18 | Stop reason: SDUPTHER

## 2017-11-08 LAB
ALBUMIN SERPL-MCNC: 3.8 G/DL (ref 3.6–4.8)
ALBUMIN/GLOB SERPL: 1.4 {RATIO} (ref 1.2–2.2)
ALP SERPL-CCNC: 91 IU/L (ref 39–117)
ALT SERPL-CCNC: 21 IU/L (ref 0–44)
AST SERPL-CCNC: 19 IU/L (ref 0–40)
BASOPHILS # BLD AUTO: 0.1 X10E3/UL (ref 0–0.2)
BASOPHILS NFR BLD AUTO: 1 %
BILIRUB SERPL-MCNC: 0.5 MG/DL (ref 0–1.2)
BUN SERPL-MCNC: 20 MG/DL (ref 8–27)
BUN/CREAT SERPL: 16 (ref 10–24)
CALCIUM SERPL-MCNC: 9.8 MG/DL (ref 8.6–10.2)
CHLORIDE SERPL-SCNC: 104 MMOL/L (ref 96–106)
CHOLEST SERPL-MCNC: 122 MG/DL (ref 100–199)
CO2 SERPL-SCNC: 21 MMOL/L (ref 18–29)
COMMENT 011824: ABNORMAL
CREAT SERPL-MCNC: 1.28 MG/DL (ref 0.76–1.27)
EOSINOPHIL # BLD AUTO: 0.5 X10E3/UL (ref 0–0.4)
EOSINOPHIL NFR BLD AUTO: 7 %
ERYTHROCYTE [DISTWIDTH] IN BLOOD BY AUTOMATED COUNT: 13.9 % (ref 12.3–15.4)
GFR SERPLBLD CREATININE-BSD FMLA CKD-EPI: 60 ML/MIN/1.73
GFR SERPLBLD CREATININE-BSD FMLA CKD-EPI: 69 ML/MIN/1.73
GLOBULIN SER CALC-MCNC: 2.8 G/DL (ref 1.5–4.5)
GLUCOSE SERPL-MCNC: 132 MG/DL (ref 65–99)
HBA1C MFR BLD: 7.2 % (ref 4.8–5.6)
HCT VFR BLD AUTO: 42.1 % (ref 37.5–51)
HDLC SERPL-MCNC: 39 MG/DL
HGB BLD-MCNC: 14 G/DL (ref 12.6–17.7)
IMM GRANULOCYTES # BLD: 0 X10E3/UL (ref 0–0.1)
IMM GRANULOCYTES NFR BLD: 0 %
IMMATURE CELLS  115398: ABNORMAL
LDLC SERPL CALC-MCNC: 67 MG/DL (ref 0–99)
LYMPHOCYTES # BLD AUTO: 2.1 X10E3/UL (ref 0.7–3.1)
LYMPHOCYTES NFR BLD AUTO: 26 %
MCH RBC QN AUTO: 32 PG (ref 26.6–33)
MCHC RBC AUTO-ENTMCNC: 33.3 G/DL (ref 31.5–35.7)
MCV RBC AUTO: 96 FL (ref 79–97)
MONOCYTES # BLD AUTO: 1 X10E3/UL (ref 0.1–0.9)
MONOCYTES NFR BLD AUTO: 13 %
MORPHOLOGY BLD-IMP: ABNORMAL
NEUTROPHILS # BLD AUTO: 4.3 X10E3/UL (ref 1.4–7)
NEUTROPHILS NFR BLD AUTO: 53 %
NRBC BLD AUTO-RTO: ABNORMAL %
PLATELET # BLD AUTO: 230 X10E3/UL (ref 150–379)
POTASSIUM SERPL-SCNC: 4.6 MMOL/L (ref 3.5–5.2)
PROT SERPL-MCNC: 6.6 G/DL (ref 6–8.5)
RBC # BLD AUTO: 4.38 X10E6/UL (ref 4.14–5.8)
SODIUM SERPL-SCNC: 141 MMOL/L (ref 134–144)
TRIGL SERPL-MCNC: 79 MG/DL (ref 0–149)
VLDLC SERPL CALC-MCNC: 16 MG/DL (ref 5–40)
WBC # BLD AUTO: 8 X10E3/UL (ref 3.4–10.8)

## 2017-12-05 ENCOUNTER — TELEPHONE (OUTPATIENT)
Dept: MEDICAL GROUP | Age: 62
End: 2017-12-05

## 2017-12-06 ENCOUNTER — OFFICE VISIT (OUTPATIENT)
Dept: MEDICAL GROUP | Age: 62
End: 2017-12-06
Payer: MEDICARE

## 2017-12-06 VITALS
WEIGHT: 276 LBS | HEART RATE: 88 BPM | BODY MASS INDEX: 37.38 KG/M2 | HEIGHT: 72 IN | SYSTOLIC BLOOD PRESSURE: 124 MMHG | DIASTOLIC BLOOD PRESSURE: 76 MMHG | TEMPERATURE: 97.9 F | OXYGEN SATURATION: 95 %

## 2017-12-06 DIAGNOSIS — I10 ESSENTIAL HYPERTENSION: ICD-10-CM

## 2017-12-06 DIAGNOSIS — N28.9 IMPAIRED RENAL FUNCTION: ICD-10-CM

## 2017-12-06 DIAGNOSIS — E78.5 DYSLIPIDEMIA: ICD-10-CM

## 2017-12-06 DIAGNOSIS — R19.5 POSITIVE FIT (FECAL IMMUNOCHEMICAL TEST): ICD-10-CM

## 2017-12-06 PROCEDURE — 99214 OFFICE O/P EST MOD 30 MIN: CPT | Performed by: INTERNAL MEDICINE

## 2017-12-06 NOTE — ASSESSMENT & PLAN NOTE
Patient is taking Invokamet  mg twice a day, Victoza 1.8 mg daily. His A1c was 7.2 on 11/7/17. He denies side effects from taking current medications. He has follow-up appointment with endocrinologist on 3/26/18.  His fasting blood sugar was around 120s at home.

## 2017-12-06 NOTE — PROGRESS NOTES
Subjective:   Perez De Guzman is a 62 y.o. male here today for evaluation and management of:      Uncontrolled type 2 diabetes mellitus with stage 3 chronic kidney disease, without long-term current use of insulin (CMS-HCC)  Patient is taking Invokamet  mg twice a day, Victoza 1.8 mg daily. His A1c was 7.2 on 11/7/17. He denies side effects from taking current medications. He has follow-up appointment with endocrinologist on 3/26/18.  His fasting blood sugar was around 120s at home.    Dyslipidemia  He is taking atorvastatin 40 mg every evening, and niacin 500 mg 3 times a day. He denies side effects from taking atorvastatin and niacin. His lipid panel is well controlled with current regimens.    Results for PEREZ DE GUZMAN (MRN [id number]) as of 12/5/2017 20:33   Ref. Range 11/7/2017 07:59   Cholesterol,Tot Latest Ref Range: 100 - 199 mg/dL 122   Triglycerides Latest Ref Range: 0 - 149 mg/dL 79   HDL Latest Ref Range: >39 mg/dL 39 (L)   LDL Latest Ref Range: 0 - 99 mg/dL 67   VLDL Cholesterol Calc Latest Ref Range: 5 - 40 mg/dL 16       Positive FIT (fecal immunochemical test)  Patient has positive stool fit test on 4/9/17. I refer him to GI for further workup. He has not follow-up with GI yet. I placed refer to GI twice already. I discussed with patient that it is important to follow up with GI to evaluate with colonoscopy. I printed out GI consult's contact information for him again.    Essential hypertension  Patient is taking moexipril 3.75 mg daily and metoprolol  mg daily. He denies side effects from taking metoprolol and moexipril. His blood pressure is stable and well-controlled with current regimens..     Obesity  Patient is not compliance with diet and exercise to lose weight. He gained gradually from last visit. Compared to the previous weight recorded, he gained about 4 pound.    Impaired renal function  Patient still has slightly elevated creatinine at 1.28. His GFR within normal. He  "denied taking NSAIDs. He does not have any urinary symptoms currently.         Current medicines (including changes today)  Current Outpatient Prescriptions   Medication Sig Dispense Refill   • FREESTYLE LANCETS Misc USE ONE TO CHECK GLUCOSE TWICE DAILY (DX E11.00) 100 Each 3   • Canagliflozin-Metformin HCl ER (INVOKAMET XR)  MG TABLET SR 24 HR Take 1 Tab by mouth 2 Times a Day. 60 Tab 11   • glucose blood (FREESTYLE LITE) strip Use one strip to check blood sugar twice a day as needed. 200 Strip 3   • metoprolol SR (TOPROL XL) 100 MG TABLET SR 24 HR Take 1 Tab by mouth every day. 90 Tab 3   • moexipril (UNIVASC) 7.5 MG Tab Take 0.5 Tabs by mouth every day. 45 Tab 3   • atorvastatin (LIPITOR) 40 MG TABS Take 1 Tab by mouth every evening. 90 Each 1   • niacin SR (NIASPAN) 500 MG TBCR Take 1 Tab by mouth 3 times a day. 90 Each 6   • [facility] ASPIRIN 81 MG PO TBEC QAM.      • NITROSTAT 0.4 MG SL Tab DISSOLVE ONE TABLET UNDER THE TONGUE EVERY 5 MINUTES AS NEEDED FOR CHEST PAIN.  DO NOT EXCEED A TOTAL OF 3 DOSES IN 15 MINUTES NOW 25 Tab 0   • VICTOZA 18 MG/3ML Solution Pen-injector injection Inject 0.3 mL as instructed every day. 9 PEN 3     No current facility-administered medications for this visit.      He  has a past medical history of Acute MI; Diabetes mellitus; Heme positive stool (6/8/2015); Hypertension; Overweight and obesity; and Pure hypercholesterolemia.    ROS   No chest pain, no shortness of breath, no abdominal pain       Objective:     Blood pressure 124/76, pulse 88, temperature 36.6 °C (97.9 °F), height 1.829 m (6' 0.01\"), weight (!) 125.2 kg (276 lb), SpO2 95 %. Body mass index is 37.42 kg/m².   Physical Exam:  General: Alert, oriented and no acute distress.  Eye contact is good, speech goal directed, affect calm  HEENT: conjunctiva non-injected, sclera non-icteric.  Oral mucous membranes pink and moist with no lesions.  Pinna normal.   Lungs: Normal respiratory effort, clear to auscultation " bilaterally with good excursion.  CV: regular rate and rhythm. No murmurs.   Abdomen: soft, non distended, nontender, Bowel sound normal.  Ext: no edema, color normal, vascularity normal, temperature normal        Assessment and Plan:   The following treatment plan was discussed     1. Uncontrolled type 2 diabetes mellitus with stage 3 chronic kidney disease, without long-term current use of insulin (CMS-[facility])  - Fairly controlled. Continue current regimens. Recheck lab 1-2 weeks before next follow up visit.  - Counseled to comply with medication and diet.   - Counseled signs and symptoms of hypoglycemia and management of hypoglycemia.   - Recommend to have retinal eye exam once a year.  - Advised to check both feet daily.   - COMP METABOLIC PANEL; Future  - MICROALBUMIN CREAT RATIO URINE; Future    2. Dyslipidemia  - Well-controlled. Continue current regimens. Recheck lab 1-2 weeks before next follow up visit.  - COMP METABOLIC PANEL; Future  - LIPID PROFILE; Future    3. Positive FIT (fecal immunochemical test)  - Patient denies abdominal pain or bloody bowel movement. However, he has fit test positive. He has never had colonoscopy in the past.  - I emphasized with patient to follow-up with GI consultant to do colonoscopy or further workup for stool fit test positive.  - I reprinted contact information for GI consultant for him today.   - CBC WITH DIFFERENTIAL; Future  - COMP METABOLIC PANEL; Future    4. Essential hypertension  - Well-controlled. Continue current regimens. Recheck lab 1-2 weeks before next follow up visit.  - Recommend to monitor blood pressure and heart rate at home.  - CBC WITH DIFFERENTIAL; Future  - COMP METABOLIC PANEL; Future    5. Class 2 obesity due to excess calories with serious comorbidity and body mass index (BMI) of 37.0 to 37.9 in adult  - Counseled for healthy diet and regular physical exercise to lose weight.   - Patient identified as having weight management issue.  Appropriate  orders and counseling given.    6. Impaired renal function  - Recommend to increase water intake and avoid taking NSAIDs.  - Discussed to control his blood sugar and blood pressure well.     7. Health Maintenance   - Patient refused to have Tdap and shingles vaccine, even after long discussion of importance of immunization.         Followup: Return in about 6 months (around 6/6/2018), or if symptoms worsen or fail to improve, for hypertension, hyperlipidemia, diabetes, obesity, lab review.      Please note that this dictation was created using voice recognition software. I have made every reasonable attempt to correct obvious errors, but I expect that there may have unintended errors in text, spelling, punctuation, or grammar that I did not discover.

## 2017-12-06 NOTE — ASSESSMENT & PLAN NOTE
Patient has positive stool fit test on 4/9/17. I refer him to GI for further workup. He has not follow-up with GI yet. I placed refer to GI twice already. I discussed with patient that it is important to follow up with GI to evaluate with colonoscopy. I printed out GI consult's contact information for him again.

## 2017-12-06 NOTE — TELEPHONE ENCOUNTER
ESTABLISHED PATIENT PRE-VISIT PLANNING     Note: Patient will not be contacted if there is no indication to call.     1.  Reviewed notes from the last few office visits within the medical group: Yes    2.  If any orders were placed at last visit or intended to be done for this visit (i.e. 6 mos follow-up), do we have Results/Consult Notes?        •  Labs - Labs ordered, completed on 11/7 and results are in chart.   Note: If patient appointment is for lab review and patient did not complete labs, check with provider if OK to reschedule patient until labs completed.       •  Imaging - Imaging was not ordered at last office visit.       •  Referrals - Referral ordered, patient has NOT been seen.    3. Is this appointment scheduled as a Hospital Follow-Up? No    4.  Immunizations were updated in Epic using WebIZ?: Epic matches WebIZ       •  Web Iz Recommendations: TDAP and ZOSTAVAX (Shingles)    5.  Patient is due for the following Health Maintenance Topics:   Health Maintenance Due   Topic Date Due   • IMM DTaP/Tdap/Td Vaccine (1 - Tdap) 03/[birth year]/1974   • COLONOSCOPY  03/27/2005   • IMM ZOSTER VACCINE  03/27/2015   • DIABETES MONOFILAMENT / LE EXAM  07/11/2017   •             6.  Patient was NOT informed to arrive 15 min prior to their scheduled appointment and bring in their medication bottles.

## 2017-12-06 NOTE — ASSESSMENT & PLAN NOTE
Patient still has slightly elevated creatinine at 1.28. His GFR within normal. He denied taking NSAIDs. He does not have any urinary symptoms currently.

## 2017-12-06 NOTE — ASSESSMENT & PLAN NOTE
Patient is taking moexipril 3.75 mg daily and metoprolol  mg daily. He denies side effects from taking metoprolol and moexipril. His blood pressure is stable and well-controlled with current regimens..

## 2017-12-06 NOTE — ASSESSMENT & PLAN NOTE
He is taking atorvastatin 40 mg every evening, and niacin 500 mg 3 times a day. He denies side effects from taking atorvastatin and niacin. His lipid panel is well controlled with current regimens.    Results for EFREN DE GUZMAN (MRN [id number]) as of 12/5/2017 20:33   Ref. Range 11/7/2017 07:59   Cholesterol,Tot Latest Ref Range: 100 - 199 mg/dL 122   Triglycerides Latest Ref Range: 0 - 149 mg/dL 79   HDL Latest Ref Range: >39 mg/dL 39 (L)   LDL Latest Ref Range: 0 - 99 mg/dL 67   VLDL Cholesterol Calc Latest Ref Range: 5 - 40 mg/dL 16

## 2017-12-06 NOTE — ASSESSMENT & PLAN NOTE
Patient is not compliance with diet and exercise to lose weight. He gained gradually from last visit. Compared to the previous weight recorded, he gained about 4 pound.

## 2018-02-21 DIAGNOSIS — I25.2 HISTORY OF ACUTE ANTERIOR WALL MYOCARDIAL INFARCTION: ICD-10-CM

## 2018-02-21 DIAGNOSIS — I10 ESSENTIAL HYPERTENSION: ICD-10-CM

## 2018-02-24 RX ORDER — MOEXIPRIL HYDROCHLORIDE 7.5 MG/1
TABLET, FILM COATED ORAL
Qty: 45 TAB | Refills: 3 | Status: SHIPPED | OUTPATIENT
Start: 2018-02-24 | End: 2018-04-18 | Stop reason: SDUPTHER

## 2018-03-20 RX ORDER — PIOGLITAZONEHYDROCHLORIDE 15 MG/1
TABLET ORAL
Qty: 90 TAB | Refills: 3 | Status: SHIPPED | OUTPATIENT
Start: 2018-03-20 | End: 2018-09-24 | Stop reason: SDUPTHER

## 2018-03-26 ENCOUNTER — OFFICE VISIT (OUTPATIENT)
Dept: ENDOCRINOLOGY | Facility: MEDICAL CENTER | Age: 63
End: 2018-03-26
Payer: MEDICARE

## 2018-03-26 VITALS
DIASTOLIC BLOOD PRESSURE: 74 MMHG | HEART RATE: 98 BPM | SYSTOLIC BLOOD PRESSURE: 108 MMHG | OXYGEN SATURATION: 97 % | HEIGHT: 72 IN | WEIGHT: 288 LBS | BODY MASS INDEX: 39.01 KG/M2

## 2018-03-26 DIAGNOSIS — I25.2 HISTORY OF ACUTE ANTERIOR WALL MYOCARDIAL INFARCTION: ICD-10-CM

## 2018-03-26 LAB
HBA1C MFR BLD: 7.7 % (ref ?–5.8)
INT CON NEG: NEGATIVE
INT CON POS: POSITIVE

## 2018-03-26 PROCEDURE — 83036 HEMOGLOBIN GLYCOSYLATED A1C: CPT | Performed by: PHYSICIAN ASSISTANT

## 2018-03-26 PROCEDURE — 99214 OFFICE O/P EST MOD 30 MIN: CPT | Performed by: PHYSICIAN ASSISTANT

## 2018-03-26 NOTE — PROGRESS NOTES
Return to office Patient Consult Note  Referred by: Elle Copoer M.D.    Reason for consult: Diabetes Management Type 2    HPI:  Perez Leone is a 62 y.o. old patient who is seeing us today for diabetes care.  This is a pleasant patient with diabetes and I appreciate the opportunity to participate in the care of this patient.    BG Diary:3/26/2018  In the AM: 123, 136, 132, 131, 130, 130  Before Bed: 182, 193, 148, 105, 184, 183     BG Diary:9/25/2017  In the AM: 109, 105, 120, 126, 109, 122, 14  Before Bed: 167, 155, 160, 60, 151, 147, 155      HbA1c on 3/26/18 was 7.7  HbA1c in the office 6/26/17  is 6.5  Labs of 1/9/2017 he has a Cr. Of 1.1, HbA1c of 8.1, GFR 66, LDL 75, HDL 34,      BG Diary:6/26/2017  In the AM: Did not bring  States 100-120     BG Diary: 3/22/17  In the AM: 131, 136, 152, 131, 112, 130, 136, 119, 128  Before Bed: 143, 145, 106, 134, 125, 136, 130, 125     BG Diary: 3/1/17  In the AM: 145, 165, 147, 161, 154, 152, 157  Before meal: 155, 160, 150, 163, 160, 164  Before Bed:144, 167, 202, 114, 181, 175     Weight: He was on 2/13/17 306 pounds and today he is 272      1. Uncontrolled type 2 diabetes mellitus with stage 3 chronic kidney disease, without long-term current use of insulin (CMS-[facility])    Now on:  1.   Victoza 1.8 injection at night    2.   Synjardy 12.5/1000 one in AM one in PM  3.   Actos 15mg qday start on 3/1/17     He is doing very well,  He is off insulin has lost 32 pounds in three month his BG numbers are perfect.      His BP is also becoming better and we will have to consider decreasing BP meds.        2. History of acute anterior wall myocardial infarction  HX of MI we need to keep him on Victoza    ROS:   Constitutional: No night sweats.  Eyes:  No visual changes.  Cardiac: No chest pain, No palpitations or racing heart rate.  Resp: No shortness of breath, No cough,   Gi: No Diarrhea    All other systems were reviewed and were/are negative.  The ROS was  revised/revisited during this office visit from the patients first office visit with me on 2/13/17 Please review the full ROS during the first office visit.    Past Medical History:  Patient Active Problem List    Diagnosis Date Noted   • Impaired renal function 05/08/2017   • Positive FIT (fecal immunochemical test) 05/08/2017   • Type 2 diabetes mellitus with hyperlipidemia (CMS-HCC) 02/13/2017   • Dyslipidemia 07/11/2016   • Essential hypertension 07/11/2016   • Uncontrolled type 2 diabetes mellitus with stage 3 chronic kidney disease, without long-term current use of insulin (CMS-HCC) 12/29/2015   • Heme positive stool 06/08/2015   • History of acute anterior wall myocardial infarction 03/09/2012   • CAD BMS LAD 03/09/2012   • Obesity 03/09/2012       Past Surgical History:  No past surgical history on file.    Allergies:  Patient has no known allergies.    Social History:  Social History     Social History   • Marital status: Single     Spouse name: N/A   • Number of children: N/A   • Years of education: N/A     Occupational History   • Not on file.     Social History Main Topics   • Smoking status: Never Smoker   • Smokeless tobacco: Never Used   • Alcohol use No   • Drug use: No   • Sexual activity: No     Other Topics Concern   • Not on file     Social History Narrative    Single    Works at the [employer] as a        Family History:  Family History   Problem Relation Age of Onset   • Heart Disease Mother      CHF   • Diabetes Brother    • Other Father      son does not father's med hx       Medications:    Current Outpatient Prescriptions:   •  pioglitazone (ACTOS) 15 MG Tab, TAKE ONE TABLET BY MOUTH ONCE DAILY, Disp: 90 Tab, Rfl: 3  •  VICTOZA 18 MG/3ML Solution Pen-injector injection, Inject 0.3 mL as instructed every day., Disp: 9 PEN, Rfl: 3  •  Canagliflozin-Metformin HCl ER (INVOKAMET XR)  MG TABLET SR 24 HR, Take 1 Tab by mouth 2 Times a Day., Disp: 60 Tab, Rfl: 11  •   glucose blood (FREESTYLE LITE) strip, Use one strip to check blood sugar twice a day as needed., Disp: 200 Strip, Rfl: 3  •  metoprolol SR (TOPROL XL) 100 MG TABLET SR 24 HR, Take 1 Tab by mouth every day., Disp: 90 Tab, Rfl: 3  •  atorvastatin (LIPITOR) 40 MG TABS, Take 1 Tab by mouth every evening., Disp: 90 Each, Rfl: 1  •  niacin SR (NIASPAN) 500 MG TBCR, Take 1 Tab by mouth 3 times a day., Disp: 90 Each, Rfl: 6  •   [facility] ASPIRIN 81 MG PO TBEC, QAM. , Disp: , Rfl:   •  moexipril (UNIVASC) 7.5 MG Tab, TAKE ONE-HALF TABLET BY MOUTH ONCE DAILY, Disp: 45 Tab, Rfl: 3  •  Insulin Pen Needle 32G X 4 MM Misc, 1 Applicator by Does not apply route 3 times a day., Disp: 100 Each, Rfl: 11  •  Insulin Pen Needle (NOVOFINE PLUS) 32G X 4 MM Misc, 1 Applicator by Does not apply route every day. Using one needle tip with victoza per day, Disp: 100 Each, Rfl: 3  •  Insulin Pen Needle (BD PEN NEEDLE ROSMERY U/F) 32G X 4 MM Misc, 1 Applicator by Does not apply route 3 times a day, with meals., Disp: 100 Each, Rfl: 11  •  NITROSTAT 0.4 MG SL Tab, DISSOLVE ONE TABLET UNDER THE TONGUE EVERY 5 MINUTES AS NEEDED FOR CHEST PAIN.  DO NOT EXCEED A TOTAL OF 3 DOSES IN 15 MINUTES NOW, Disp: 25 Tab, Rfl: 0  •  FREESTYLE LANCETS Misc, USE ONE TO CHECK GLUCOSE TWICE DAILY (DX E11.00), Disp: 100 Each, Rfl: 3        Physical Examination:   Vital signs: /74   Pulse 98   Ht 1.829 m (6')   Wt (!) 130.6 kg (288 lb)   SpO2 97%   BMI 39.06 kg/m²   General: No distress, cooperative, well dressed and well nourished.   Eyes: No scleral icterus or discharge, No hyposphagma  ENMT: Normal on external inspection of nose, lips, No nasal drainage   Neck: No abnormal masses on inspection  Resp: Normal effort, Bilateral clear to auscultation, No wheezing, No rales  CVS: Regular rate and rhythm, S1 S2 normal, No murmur. No gallop  Extremities: No edema bilateral extremities  Neuro: Alert and oriented  Skin: No rash, No Ulcers  Psych: Normal mood and  affect      Assessment and Plan:    1. Uncontrolled type 2 diabetes mellitus with stage 3 chronic kidney disease, without long-term current use of insulin (CMS-[facility])    Now on:  1.   Victoza 1.8 injection at night    2.   Synjardy 12.5/1000 one in AM one in PM  3.   Actos 15mg qday start on 3/1/17    2. History of acute anterior wall myocardial infarction  Needs the Victoza and Jardiance  I dont want to start him on more meds work on georgina eating habits.  We talked about walking daily.      No Follow-up on file.    Blood glucose log: Check BG in the morning when wake up, before lunch or dinner and before bed.  So three times a day.  Always bring BG diary to the next office visit.     Thank you kindly for allowing me to participate in the diabetes care plan for this patient.    Vish Mota PA-C, BC-ADM  Board Certified - Advanced Diabetes Management  03/26/18    CC:   Elle Cooper M.D.

## 2018-04-18 ENCOUNTER — TELEPHONE (OUTPATIENT)
Dept: MEDICAL GROUP | Age: 63
End: 2018-04-18

## 2018-04-18 DIAGNOSIS — I10 ESSENTIAL HYPERTENSION: ICD-10-CM

## 2018-04-18 DIAGNOSIS — I25.2 HISTORY OF ACUTE ANTERIOR WALL MYOCARDIAL INFARCTION: ICD-10-CM

## 2018-04-18 RX ORDER — MOEXIPRIL HYDROCHLORIDE 7.5 MG/1
7.5 TABLET, FILM COATED ORAL DAILY
Qty: 90 TAB | Refills: 3 | Status: SHIPPED | OUTPATIENT
Start: 2018-04-18

## 2018-04-18 NOTE — TELEPHONE ENCOUNTER
1. Caller Name: [facility]                                         Call Back Number: [phone]      Patient approves a detailed voicemail message: N\A    Received document from Pharmacy stating that the patient states that the dose has been changed from 1/2 tablet once a day to one whole tablet once a day.  Could not confirm the change in office visit notes.    Please advise.

## 2018-05-02 LAB
ALBUMIN SERPL-MCNC: 3.9 G/DL (ref 3.6–4.8)
ALBUMIN/CREAT UR: 6.7 MG/G CREAT (ref 0–30)
ALBUMIN/GLOB SERPL: 1.5 {RATIO} (ref 1.2–2.2)
ALP SERPL-CCNC: 98 IU/L (ref 39–117)
ALT SERPL-CCNC: 21 IU/L (ref 0–44)
AST SERPL-CCNC: 15 IU/L (ref 0–40)
BASOPHILS # BLD AUTO: 0.1 X10E3/UL (ref 0–0.2)
BASOPHILS NFR BLD AUTO: 1 %
BILIRUB SERPL-MCNC: 0.6 MG/DL (ref 0–1.2)
BUN SERPL-MCNC: 27 MG/DL (ref 8–27)
BUN/CREAT SERPL: 18 (ref 10–24)
CALCIUM SERPL-MCNC: 9.7 MG/DL (ref 8.6–10.2)
CHLORIDE SERPL-SCNC: 104 MMOL/L (ref 96–106)
CHOLEST SERPL-MCNC: 120 MG/DL (ref 100–199)
CO2 SERPL-SCNC: 22 MMOL/L (ref 18–29)
CREAT SERPL-MCNC: 1.47 MG/DL (ref 0.76–1.27)
CREAT UR-MCNC: 85.7 MG/DL
EOSINOPHIL # BLD AUTO: 0.5 X10E3/UL (ref 0–0.4)
EOSINOPHIL NFR BLD AUTO: 5 %
ERYTHROCYTE [DISTWIDTH] IN BLOOD BY AUTOMATED COUNT: 13.9 % (ref 12.3–15.4)
GFR SERPLBLD CREATININE-BSD FMLA CKD-EPI: 50 ML/MIN/1.73
GFR SERPLBLD CREATININE-BSD FMLA CKD-EPI: 58 ML/MIN/1.73
GLOBULIN SER CALC-MCNC: 2.6 G/DL (ref 1.5–4.5)
GLUCOSE SERPL-MCNC: 148 MG/DL (ref 65–99)
HCT VFR BLD AUTO: 40.7 % (ref 37.5–51)
HDLC SERPL-MCNC: 38 MG/DL
HGB BLD-MCNC: 13.9 G/DL (ref 13–17.7)
IMM GRANULOCYTES # BLD: 0 X10E3/UL (ref 0–0.1)
IMM GRANULOCYTES NFR BLD: 0 %
IMMATURE CELLS  115398: ABNORMAL
LABORATORY COMMENT REPORT: ABNORMAL
LDLC SERPL CALC-MCNC: 62 MG/DL (ref 0–99)
LYMPHOCYTES # BLD AUTO: 2.8 X10E3/UL (ref 0.7–3.1)
LYMPHOCYTES NFR BLD AUTO: 31 %
MCH RBC QN AUTO: 32.6 PG (ref 26.6–33)
MCHC RBC AUTO-ENTMCNC: 34.2 G/DL (ref 31.5–35.7)
MCV RBC AUTO: 96 FL (ref 79–97)
MICROALBUMIN UR-MCNC: 5.7 UG/ML
MONOCYTES # BLD AUTO: 1 X10E3/UL (ref 0.1–0.9)
MONOCYTES NFR BLD AUTO: 11 %
MORPHOLOGY BLD-IMP: ABNORMAL
NEUTROPHILS # BLD AUTO: 4.6 X10E3/UL (ref 1.4–7)
NEUTROPHILS NFR BLD AUTO: 52 %
NRBC BLD AUTO-RTO: ABNORMAL %
PLATELET # BLD AUTO: 233 X10E3/UL (ref 150–379)
POTASSIUM SERPL-SCNC: 4.8 MMOL/L (ref 3.5–5.2)
PROT SERPL-MCNC: 6.5 G/DL (ref 6–8.5)
RBC # BLD AUTO: 4.26 X10E6/UL (ref 4.14–5.8)
SODIUM SERPL-SCNC: 140 MMOL/L (ref 134–144)
TRIGL SERPL-MCNC: 102 MG/DL (ref 0–149)
VLDLC SERPL CALC-MCNC: 20 MG/DL (ref 5–40)
WBC # BLD AUTO: 9 X10E3/UL (ref 3.4–10.8)

## 2018-05-07 ENCOUNTER — OFFICE VISIT (OUTPATIENT)
Dept: MEDICAL GROUP | Age: 63
End: 2018-05-07
Payer: MEDICARE

## 2018-05-07 VITALS
HEIGHT: 72 IN | OXYGEN SATURATION: 95 % | WEIGHT: 294 LBS | TEMPERATURE: 97.8 F | SYSTOLIC BLOOD PRESSURE: 116 MMHG | DIASTOLIC BLOOD PRESSURE: 72 MMHG | HEART RATE: 89 BPM | BODY MASS INDEX: 39.82 KG/M2

## 2018-05-07 DIAGNOSIS — E66.01 CLASS 2 SEVERE OBESITY DUE TO EXCESS CALORIES WITH SERIOUS COMORBIDITY AND BODY MASS INDEX (BMI) OF 39.0 TO 39.9 IN ADULT (HCC): ICD-10-CM

## 2018-05-07 DIAGNOSIS — E78.5 DYSLIPIDEMIA: ICD-10-CM

## 2018-05-07 DIAGNOSIS — I10 ESSENTIAL HYPERTENSION: ICD-10-CM

## 2018-05-07 DIAGNOSIS — Z12.11 COLON CANCER SCREENING: ICD-10-CM

## 2018-05-07 DIAGNOSIS — N28.9 IMPAIRED RENAL FUNCTION: ICD-10-CM

## 2018-05-07 PROCEDURE — 99214 OFFICE O/P EST MOD 30 MIN: CPT | Performed by: INTERNAL MEDICINE

## 2018-05-07 RX ORDER — LANCETS 28 GAUGE
EACH MISCELLANEOUS
Qty: 100 EACH | Refills: 3 | Status: SHIPPED
Start: 2018-05-07 | End: 2019-02-13 | Stop reason: SDUPTHER

## 2018-05-07 ASSESSMENT — PATIENT HEALTH QUESTIONNAIRE - PHQ9: CLINICAL INTERPRETATION OF PHQ2 SCORE: 0

## 2018-05-07 NOTE — ASSESSMENT & PLAN NOTE
Patient stated that he take metoprolol  mg daily and moexipril 7.5 mg daily as instructed. His blood pressure is stable and well-controlled. She denies side effects from taking both blood pressure medications.

## 2018-05-07 NOTE — ASSESSMENT & PLAN NOTE
Patient stated that he does not have any regular physical exercise. He is also noncompliance with diet. We discussed to do cardio exercise 3-5 times a week. We also discussed healthy diet and diabetes diet to lose weight.

## 2018-05-07 NOTE — ASSESSMENT & PLAN NOTE
Patient is taking atorvastatin 40 mg every evening, and niacin 500 mg 3 times a day. He denies side effects from taking atorvastatin and niacin. His LDL at goal. He has normal liver enzymes on recent blood tests. I reviewed his blood test results with him in clinic today.    Results for EFREN DE GUZMAN (MRN [id number]) as of 5/7/2018 13:28   Ref. Range 4/30/2018 00:00   Cholesterol,Tot Latest Ref Range: 100 - 199 mg/dL 120   Triglycerides Latest Ref Range: 0 - 149 mg/dL 102   HDL Latest Ref Range: >39 mg/dL 38 (L)   LDL Latest Ref Range: 0 - 99 mg/dL 62   VLDL Cholesterol Calc Latest Ref Range: 5 - 40 mg/dL 20

## 2018-05-07 NOTE — PROGRESS NOTES
Subjective:   Perez De Guzman is a 63 y.o. male here today for evaluation and management of:      Dyslipidemia  Patient is taking atorvastatin 40 mg every evening, and niacin 500 mg 3 times a day. He denies side effects from taking atorvastatin and niacin. His LDL at goal. He has normal liver enzymes on recent blood tests. I reviewed his blood test results with him in clinic today.    Results for PEREZ DE GUZMAN (MRN [id number]) as of 5/7/2018 13:28   Ref. Range 4/30/2018 00:00   Cholesterol,Tot Latest Ref Range: 100 - 199 mg/dL 120   Triglycerides Latest Ref Range: 0 - 149 mg/dL 102   HDL Latest Ref Range: >39 mg/dL 38 (L)   LDL Latest Ref Range: 0 - 99 mg/dL 62   VLDL Cholesterol Calc Latest Ref Range: 5 - 40 mg/dL 20       Essential hypertension  Patient stated that he take metoprolol  mg daily and moexipril 7.5 mg daily as instructed. His blood pressure is stable and well-controlled. She denies side effects from taking both blood pressure medications.    Impaired renal function  He still has elevated creatinine with low GFR. Patient has chronic kidney disease stage III. He denied taking over-the-counter NSAIDs. We discussed to avoid kidney offending agents, to drink water 40-60 ounces of water per day regularly to keep well-hydrated and controlled diabetes to prevent worsening kidney function. Patient does not want to refer to nephrologist yet.    Results for PEREZ DE GUZMAN (MRN [id number]) as of 5/7/2018 13:28   Ref. Range 11/7/2017 07:59 3/26/2018 10:40 4/30/2018 00:00   Bun Latest Ref Range: 8 - 27 mg/dL 20  27   Creatinine Latest Ref Range: 0.76 - 1.27 mg/dL 1.28 (H)  1.47 (H)   GFR If  Latest Ref Range: >59 mL/min/1.73 69  58 (L)   GFR If Non  Latest Ref Range: >59 mL/min/1.73 60  50 (L)       Obesity  Patient stated that he does not have any regular physical exercise. He is also noncompliance with diet. We discussed to do cardio exercise 3-5 times a week. We also  discussed healthy diet and diabetes diet to lose weight.    Uncontrolled type 2 diabetes mellitus with stage 3 chronic kidney disease, without long-term current use of insulin (CMS-HCC)  Patient follows with endocrinologist for diabetes control. He stated that he takes all his diabetes medication as instructed. He denies hypoglycemia at home. A1c was 7.7 on 3/26/18. He has follow-up appointment with endocrinologist in September 2018.         Current medicines (including changes today)  Current Outpatient Prescriptions   Medication Sig Dispense Refill   • FREESTYLE LANCETS Misc Use one to check blood sugar once a day or any time when he has low sugar symptoms. 100 Each 3   • moexipril (UNIVASC) 7.5 MG Tab Take 1 Tab by mouth every day. 90 Tab 3   • pioglitazone (ACTOS) 15 MG Tab TAKE ONE TABLET BY MOUTH ONCE DAILY 90 Tab 3   • Insulin Pen Needle 32G X 4 MM Misc 1 Applicator by Does not apply route 3 times a day. 100 Each 11   • Insulin Pen Needle (NOVOFINE PLUS) 32G X 4 MM Misc 1 Applicator by Does not apply route every day. Using one needle tip with victoza per day 100 Each 3   • Insulin Pen Needle (BD PEN NEEDLE ROSMERY U/F) 32G X 4 MM Misc 1 Applicator by Does not apply route 3 times a day, with meals. 100 Each 11   • NITROSTAT 0.4 MG SL Tab DISSOLVE ONE TABLET UNDER THE TONGUE EVERY 5 MINUTES AS NEEDED FOR CHEST PAIN.  DO NOT EXCEED A TOTAL OF 3 DOSES IN 15 MINUTES NOW 25 Tab 0   • VICTOZA 18 MG/3ML Solution Pen-injector injection Inject 0.3 mL as instructed every day. 9 PEN 3   • Canagliflozin-Metformin HCl ER (INVOKAMET XR)  MG TABLET SR 24 HR Take 1 Tab by mouth 2 Times a Day. 60 Tab 11   • glucose blood (FREESTYLE LITE) strip Use one strip to check blood sugar twice a day as needed. 200 Strip 3   • metoprolol SR (TOPROL XL) 100 MG TABLET SR 24 HR Take 1 Tab by mouth every day. 90 Tab 3   • atorvastatin (LIPITOR) 40 MG TABS Take 1 Tab by mouth every evening. 90 Each 1   • niacin SR (NIASPAN) 500 MG TBCR  Take 1 Tab by mouth 3 times a day. 90 Each 6   • [facility] ASPIRIN 81 MG PO TBEC QAM.        No current facility-administered medications for this visit.      He  has a past medical history of Acute MI (HCC); Diabetes mellitus; Heme positive stool (6/8/2015); Hypertension; Overweight and obesity; and Pure hypercholesterolemia.    ROS   No chest pain, no shortness of breath, no abdominal pain       Objective:     Blood pressure 116/72, pulse 89, temperature 36.6 °C (97.8 °F), height 1.829 m (6'), weight (!) 133.4 kg (294 lb), SpO2 95 %. Body mass index is 39.87 kg/m².   Physical Exam:  General: Alert, oriented and no acute distress.  Eye contact is good, speech goal directed, affect calm  HEENT: conjunctiva non-injected, sclera non-icteric.  Oral mucous membranes pink and moist with no lesions.  Pinna normal.   Lungs: Normal respiratory effort, clear to auscultation bilaterally with good excursion.  CV: regular rate and rhythm. No murmurs.   Abdomen: soft, non distended, nontender, Bowel sound normal.  Ext: no edema, color normal, vascularity normal, temperature normal    Diabetes foot exam:  Monofilament testing with a 10 gram force: sensation: intact bilaterally  Visual Inspection: Feet without maceration, ulcers, or fissures.  Pedal pulses: intact bilaterally        Assessment and Plan:   The following treatment plan was discussed     1. Dyslipidemia  - Well-controlled. Continue atorvastatin 40 mg every evening, and niacin 500 mg 3 times a day.  - Advised to eat low fat, low carbohydrate and high fiber diet as well as do cardio physical exercise regularly.   - COMP METABOLIC PANEL; Future  - LIPID PROFILE; Future    2. Essential hypertension  - Well-controlled. Continue current regimens. Recheck lab 1-2 weeks before next follow up visit.  - Recommend to monitor blood pressure and heart rate at home.  - COMP METABOLIC PANEL; Future    3. Impaired renal function  - Recommend to avoid NSAIDs. Recommend to increase  water intake. Continue moexipril 7.5 mg daily.  - COMP METABOLIC PANEL; Future    4. Uncontrolled type 2 diabetes mellitus with stage 3 chronic kidney disease, without long-term current use of insulin ([facility])  - A1c is not at goal yet. Patient will continue Invokamet  mg one tablet twice a day, Actos 15 mg one tablet once a day and Victoza 1.8 mg daily.   - Counseled to comply with medication and diet.   - Counseled signs and symptoms of hypoglycemia and management of hypoglycemia.   - Recommend to have retinal eye exam once a year.  - Advised to check both feet daily.   - COMP METABOLIC PANEL; Future  - MICROALBUMIN CREAT RATIO URINE; Future  - Diabetic Monofilament Lower Extremity Exam    5. Class 2 severe obesity due to excess calories with serious comorbidity and body mass index (BMI) of 39.0 to 39.9 in adult ([facility])  - Noncompliance with diet and exercise.  - Counseled for healthy diet and regular physical exercise to lose weight.   - Patient identified as having weight management issue.  Appropriate orders and counseling given.    6. Colon cancer screening  - Patient has positive stool fit test last year, and I referred him to GI consultant in July 2017. Patient has not scheduled with GI consultant yet. I placed a new referral again and advised patient to schedule with GI consult for colonoscopy.  - REFERRAL TO GASTROENTEROLOGY        Followup: Return in about 6 months (around 11/7/2018), or if symptoms worsen or fail to improve, for diabetes, hypertension, hyperlipidemia, impaired kidney function, lab review.      Please note that this dictation was created using voice recognition software. I have made every reasonable attempt to correct obvious errors, but I expect that there may have unintended errors in text, spelling, punctuation, or grammar that I did not discover.

## 2018-05-07 NOTE — ASSESSMENT & PLAN NOTE
He still has elevated creatinine with low GFR. Patient has chronic kidney disease stage III. He denied taking over-the-counter NSAIDs. We discussed to avoid kidney offending agents, to drink water 40-60 ounces of water per day regularly to keep well-hydrated and controlled diabetes to prevent worsening kidney function. Patient does not want to refer to nephrologist yet.    Results for EFREN DE GUZMAN (MRN [id number]) as of 5/7/2018 13:28   Ref. Range 11/7/2017 07:59 3/26/2018 10:40 4/30/2018 00:00   Bun Latest Ref Range: 8 - 27 mg/dL 20  27   Creatinine Latest Ref Range: 0.76 - 1.27 mg/dL 1.28 (H)  1.47 (H)   GFR If  Latest Ref Range: >59 mL/min/1.73 69  58 (L)   GFR If Non  Latest Ref Range: >59 mL/min/1.73 60  50 (L)

## 2018-05-07 NOTE — ASSESSMENT & PLAN NOTE
Patient follows with endocrinologist for diabetes control. He stated that he takes all his diabetes medication as instructed. He denies hypoglycemia at home. A1c was 7.7 on 3/26/18. He has follow-up appointment with endocrinologist in September 2018.

## 2018-05-09 RX ORDER — BLOOD-GLUCOSE METER
KIT MISCELLANEOUS
Qty: 150 STRIP | Refills: 5 | Status: SHIPPED
Start: 2018-05-09 | End: 2018-05-09

## 2018-05-14 RX ORDER — LIRAGLUTIDE 6 MG/ML
INJECTION SUBCUTANEOUS
Qty: 9 PEN | Refills: 3 | Status: SHIPPED | OUTPATIENT
Start: 2018-05-14 | End: 2018-09-24

## 2018-07-05 DIAGNOSIS — I10 ESSENTIAL HYPERTENSION: ICD-10-CM

## 2018-07-05 DIAGNOSIS — I25.2 HISTORY OF ACUTE ANTERIOR WALL MYOCARDIAL INFARCTION: ICD-10-CM

## 2018-07-05 RX ORDER — METOPROLOL SUCCINATE 100 MG/1
TABLET, EXTENDED RELEASE ORAL
Qty: 90 TAB | Refills: 3 | Status: SHIPPED | OUTPATIENT
Start: 2018-07-05 | End: 2019-12-27

## 2018-07-13 RX ORDER — BLOOD-GLUCOSE METER
KIT MISCELLANEOUS
Qty: 100 STRIP | Refills: 3 | Status: SHIPPED
Start: 2018-07-13 | End: 2019-02-13 | Stop reason: SDUPTHER

## 2018-07-13 NOTE — TELEPHONE ENCOUNTER
Prescription faxed to:    [facility] [id number]  [place] - [address] [facility] [address]  [facility] [place] [address]  Phone: [phone] Fax: [phone]  .

## 2018-07-17 VITALS — DIASTOLIC BLOOD PRESSURE: 87 MMHG | SYSTOLIC BLOOD PRESSURE: 125 MMHG

## 2018-07-17 LAB
ANION GAP SERPL CALC-SCNC: 9 MMOL/L (ref 5–15)
BASOPHILS # BLD AUTO: 0.04 X10^3/UL (ref 0–0.1)
BASOPHILS NFR BLD AUTO: 0 % (ref 0–1)
CALCIUM SERPL-MCNC: 9.5 MG/DL (ref 8.5–10.1)
CHLORIDE SERPL-SCNC: 108 MMOL/L (ref 98–107)
CREAT SERPL-MCNC: 1.56 MG/DL (ref 0.7–1.3)
EOSINOPHIL # BLD AUTO: 0.27 X10^3/UL (ref 0–0.4)
EOSINOPHIL NFR BLD AUTO: 3 % (ref 1–7)
ERYTHROCYTE [DISTWIDTH] IN BLOOD BY AUTOMATED COUNT: 14.5 % (ref 9.4–14.8)
LYMPHOCYTES # BLD AUTO: 2.01 X10^3/UL (ref 1–3.4)
LYMPHOCYTES NFR BLD AUTO: 22 % (ref 22–44)
MCH RBC QN AUTO: 32.4 PG (ref 27.5–34.5)
MCHC RBC AUTO-ENTMCNC: 33.6 G/DL (ref 33.2–36.2)
MCV RBC AUTO: 96.6 FL (ref 81–97)
MD: NO
MONOCYTES # BLD AUTO: 0.88 X10^3/UL (ref 0.2–0.8)
MONOCYTES NFR BLD AUTO: 10 % (ref 2–9)
NEUTROPHILS # BLD AUTO: 5.86 X10^3/UL (ref 1.8–6.8)
NEUTROPHILS NFR BLD AUTO: 65 % (ref 42–75)
PLATELET # BLD AUTO: 235 X10^3/UL (ref 130–400)
PMV BLD AUTO: 8.8 FL (ref 7.4–10.4)
RBC # BLD AUTO: 4.35 X10^6/UL (ref 4.38–5.82)

## 2018-07-19 ENCOUNTER — HOSPITAL ENCOUNTER (INPATIENT)
Dept: HOSPITAL 8 - CACL | Age: 63
LOS: 1 days | Discharge: HOME | DRG: 226 | End: 2018-07-20
Attending: INTERNAL MEDICINE | Admitting: INTERNAL MEDICINE
Payer: MEDICARE

## 2018-07-19 VITALS — DIASTOLIC BLOOD PRESSURE: 92 MMHG | SYSTOLIC BLOOD PRESSURE: 147 MMHG

## 2018-07-19 VITALS — WEIGHT: 290.61 LBS | HEIGHT: 72 IN | BODY MASS INDEX: 39.36 KG/M2

## 2018-07-19 VITALS — SYSTOLIC BLOOD PRESSURE: 126 MMHG | DIASTOLIC BLOOD PRESSURE: 72 MMHG

## 2018-07-19 DIAGNOSIS — N17.0: ICD-10-CM

## 2018-07-19 DIAGNOSIS — I10: ICD-10-CM

## 2018-07-19 DIAGNOSIS — E11.9: ICD-10-CM

## 2018-07-19 DIAGNOSIS — I25.10: ICD-10-CM

## 2018-07-19 DIAGNOSIS — I25.5: Primary | ICD-10-CM

## 2018-07-19 PROCEDURE — C1779 LEAD, PMKR, TRANSVENOUS VDD: HCPCS

## 2018-07-19 PROCEDURE — 71045 X-RAY EXAM CHEST 1 VIEW: CPT

## 2018-07-19 PROCEDURE — C1895 LEAD, AICD, ENDO DUAL COIL: HCPCS

## 2018-07-19 PROCEDURE — 93005 ELECTROCARDIOGRAM TRACING: CPT

## 2018-07-19 PROCEDURE — 02H63KZ INSERTION OF DEFIBRILLATOR LEAD INTO RIGHT ATRIUM, PERCUTANEOUS APPROACH: ICD-10-PCS | Performed by: INTERNAL MEDICINE

## 2018-07-19 PROCEDURE — 33249 INSJ/RPLCMT DEFIB W/LEAD(S): CPT

## 2018-07-19 PROCEDURE — 99157 MOD SED OTHER PHYS/QHP EA: CPT

## 2018-07-19 PROCEDURE — 36415 COLL VENOUS BLD VENIPUNCTURE: CPT

## 2018-07-19 PROCEDURE — C1892 INTRO/SHEATH,FIXED,PEEL-AWAY: HCPCS

## 2018-07-19 PROCEDURE — 80048 BASIC METABOLIC PNL TOTAL CA: CPT

## 2018-07-19 PROCEDURE — 85025 COMPLETE CBC W/AUTO DIFF WBC: CPT

## 2018-07-19 PROCEDURE — 99156 MOD SED OTH PHYS/QHP 5/>YRS: CPT

## 2018-07-19 PROCEDURE — C1721 AICD, DUAL CHAMBER: HCPCS

## 2018-07-19 PROCEDURE — 0JH608Z INSERTION OF DEFIBRILLATOR GENERATOR INTO CHEST SUBCUTANEOUS TISSUE AND FASCIA, OPEN APPROACH: ICD-10-PCS | Performed by: INTERNAL MEDICINE

## 2018-07-19 PROCEDURE — 71046 X-RAY EXAM CHEST 2 VIEWS: CPT

## 2018-07-19 PROCEDURE — 02HK3KZ INSERTION OF DEFIBRILLATOR LEAD INTO RIGHT VENTRICLE, PERCUTANEOUS APPROACH: ICD-10-PCS | Performed by: INTERNAL MEDICINE

## 2018-07-19 RX ADMIN — SODIUM CHLORIDE SCH MLS/HR: 0.9 INJECTION, SOLUTION INTRAVENOUS at 19:41

## 2018-07-19 RX ADMIN — SODIUM CHLORIDE SCH MLS/HR: 0.9 INJECTION, SOLUTION INTRAVENOUS at 13:38

## 2018-07-19 RX ADMIN — CEFAZOLIN SODIUM SCH MLS/HR: 1 SOLUTION INTRAVENOUS at 20:40

## 2018-07-19 RX ADMIN — BUDESONIDE AND FORMOTEROL FUMARATE DIHYDRATE SCH TAB: 160; 4.5 AEROSOL RESPIRATORY (INHALATION) at 21:00

## 2018-07-19 RX ADMIN — SODIUM CHLORIDE, PRESERVATIVE FREE SCH ML: 5 INJECTION INTRAVENOUS at 20:39

## 2018-07-20 VITALS — DIASTOLIC BLOOD PRESSURE: 82 MMHG | SYSTOLIC BLOOD PRESSURE: 125 MMHG

## 2018-07-20 VITALS — SYSTOLIC BLOOD PRESSURE: 135 MMHG | DIASTOLIC BLOOD PRESSURE: 72 MMHG

## 2018-07-20 RX ADMIN — SODIUM CHLORIDE, PRESERVATIVE FREE SCH ML: 5 INJECTION INTRAVENOUS at 09:00

## 2018-07-20 RX ADMIN — SODIUM CHLORIDE SCH MLS/HR: 0.9 INJECTION, SOLUTION INTRAVENOUS at 09:13

## 2018-07-20 RX ADMIN — CEFAZOLIN SODIUM SCH MLS/HR: 1 SOLUTION INTRAVENOUS at 05:00

## 2018-07-20 RX ADMIN — CEFAZOLIN SODIUM SCH MLS/HR: 1 SOLUTION INTRAVENOUS at 11:54

## 2018-07-20 RX ADMIN — BUDESONIDE AND FORMOTEROL FUMARATE DIHYDRATE SCH TAB: 160; 4.5 AEROSOL RESPIRATORY (INHALATION) at 09:00

## 2018-07-20 RX ADMIN — SODIUM CHLORIDE SCH MLS/HR: 0.9 INJECTION, SOLUTION INTRAVENOUS at 03:29

## 2018-07-30 RX ORDER — CANAGLIFLOZIN AND METFORMIN HYDROCHLORIDE 50; 500 MG/1; MG/1
TABLET, FILM COATED, EXTENDED RELEASE ORAL
Qty: 90 TAB | Refills: 3 | Status: SHIPPED | OUTPATIENT
Start: 2018-07-30 | End: 2018-09-24 | Stop reason: SDUPTHER

## 2018-07-30 NOTE — TELEPHONE ENCOUNTER
Was the patient seen in the last year in this department? Yes    Does patient have an active prescription for medications requested? Yes    Received Request Via: Patient     INVOKAMET XR  MG TABLET SR 24 HR  TAKE ONE TABLET BY MOUTH TWICE DAILY

## 2018-08-02 DIAGNOSIS — E11.65 TYPE 2 DIABETES MELLITUS WITH HYPERGLYCEMIA, WITHOUT LONG-TERM CURRENT USE OF INSULIN (HCC): ICD-10-CM

## 2018-08-02 RX ORDER — LIRAGLUTIDE 6 MG/ML
1.8 INJECTION SUBCUTANEOUS DAILY
Qty: 9 PEN | Refills: 0 | Status: SHIPPED | OUTPATIENT
Start: 2018-08-02 | End: 2018-09-24 | Stop reason: SDUPTHER

## 2018-08-02 NOTE — TELEPHONE ENCOUNTER
Was the patient seen in the last year in this department? Yes    Does patient have an active prescription for medications requested? No     Received Request Via: [facility]

## 2018-08-06 ENCOUNTER — OFFICE VISIT (OUTPATIENT)
Dept: MEDICAL GROUP | Age: 63
End: 2018-08-06
Payer: MEDICARE

## 2018-08-06 VITALS
SYSTOLIC BLOOD PRESSURE: 126 MMHG | OXYGEN SATURATION: 94 % | RESPIRATION RATE: 12 BRPM | HEIGHT: 72 IN | WEIGHT: 294.8 LBS | TEMPERATURE: 98.6 F | BODY MASS INDEX: 39.93 KG/M2 | HEART RATE: 86 BPM | DIASTOLIC BLOOD PRESSURE: 86 MMHG

## 2018-08-06 DIAGNOSIS — R19.5 POSITIVE FIT (FECAL IMMUNOCHEMICAL TEST): ICD-10-CM

## 2018-08-06 DIAGNOSIS — E11.22 TYPE 2 DIABETES MELLITUS WITH STAGE 3 CHRONIC KIDNEY DISEASE, WITHOUT LONG-TERM CURRENT USE OF INSULIN (HCC): ICD-10-CM

## 2018-08-06 DIAGNOSIS — Z95.818 PRESENCE OF OTHER CARDIAC IMPLANTS AND GRAFTS: ICD-10-CM

## 2018-08-06 DIAGNOSIS — N18.30 TYPE 2 DIABETES MELLITUS WITH STAGE 3 CHRONIC KIDNEY DISEASE, WITHOUT LONG-TERM CURRENT USE OF INSULIN (HCC): ICD-10-CM

## 2018-08-06 DIAGNOSIS — E78.5 DYSLIPIDEMIA: ICD-10-CM

## 2018-08-06 DIAGNOSIS — I10 ESSENTIAL HYPERTENSION: ICD-10-CM

## 2018-08-06 DIAGNOSIS — Z09 HOSPITAL DISCHARGE FOLLOW-UP: Primary | ICD-10-CM

## 2018-08-06 PROBLEM — Z95.0 PACEMAKER: Status: ACTIVE | Noted: 2018-08-06

## 2018-08-06 PROCEDURE — 99203 OFFICE O/P NEW LOW 30 MIN: CPT | Mod: 25 | Performed by: FAMILY MEDICINE

## 2018-08-06 PROCEDURE — 99358 PROLONG SERVICE W/O CONTACT: CPT | Performed by: FAMILY MEDICINE

## 2018-08-06 ASSESSMENT — ACTIVITIES OF DAILY LIVING (ADL): BATHING_REQUIRES_ASSISTANCE: 0

## 2018-08-06 ASSESSMENT — ENCOUNTER SYMPTOMS: GENERAL WELL-BEING: FAIR

## 2018-08-06 ASSESSMENT — PATIENT HEALTH QUESTIONNAIRE - PHQ9: CLINICAL INTERPRETATION OF PHQ2 SCORE: 0

## 2018-08-06 NOTE — Clinical Note
Please call [facility] to request actual DC summary related to patient's recent hospitalization.

## 2018-08-06 NOTE — LETTER
[employer]  Elle Cooper M.D.  [address] [facility] [address] [facility] [address]  [address]  [place] [address]  Fax: [phone]   Authorization for Release/Disclosure of   Protected Health Information   Name: PEREZ DE GUMZAN : 1955 SSN: [id number]   Address: [address] [place], [place] [address] [address] Phone:    [phone] (home)    I authorize the entity listed below to release/disclose the PHI below to:   [employer]/Elle Cooper M.D. and Chely Vanessa M.D.   Provider or Entity Name:  [facility] [place]   Phone:      Fax:     Reason for request: continuity of care   Information to be released:    [  ] LAST COLONOSCOPY,  including any PATH REPORT and follow-up  [  ] LAST FIT/COLOGUARD RESULT [  ] LAST DEXA  [  ] LAST MAMMOGRAM  [  ] LAST PAP  [  ] LAST LABS [  ] RETINA EXAM REPORT  [  ] IMMUNIZATION RECORDS  [XxX  ] Release all info      [  ] Check here and initial the line next to each item to release ALL health information INCLUDING  _____ Care and treatment for drug and / or alcohol abuse  _____ HIV testing, infection status, or AIDS  _____ Genetic Testing    DATES OF SERVICE OR TIME PERIOD TO BE DISCLOSED: _____________  I understand and acknowledge that:  * This Authorization may be revoked at any time by you in writing, except if your health information has already been used or disclosed.  * Your health information that will be used or disclosed as a result of you signing this authorization could be re-disclosed by the recipient. If this occurs, your re-disclosed health information may no longer be protected by State or Federal laws.  * You may refuse to sign this Authorization. Your refusal will not affect your ability to obtain treatment.  * This Authorization becomes effective upon signing and will  on (date) __________.      If no date is indicated, this Authorization will  one (1) year from the signature date.    Name: Perez De Guzman    Signature:   Date:     2018       PLEASE FAX REQUESTED  RECORDS BACK TO: [phone]

## 2018-08-08 PROBLEM — E11.29 TYPE 2 DIABETES MELLITUS WITH KIDNEY COMPLICATION, WITH LONG-TERM CURRENT USE OF INSULIN (HCC): Status: ACTIVE | Noted: 2017-02-13

## 2018-08-08 PROBLEM — E11.22 CKD STAGE 3 DUE TO TYPE 2 DIABETES MELLITUS (HCC): Status: ACTIVE | Noted: 2017-05-08

## 2018-08-08 PROBLEM — N18.30 TYPE 2 DIABETES MELLITUS WITH STAGE 3 CHRONIC KIDNEY DISEASE, WITHOUT LONG-TERM CURRENT USE OF INSULIN (HCC): Status: ACTIVE | Noted: 2017-02-13

## 2018-08-08 PROBLEM — Z95.818 PRESENCE OF OTHER CARDIAC IMPLANTS AND GRAFTS: Status: ACTIVE | Noted: 2018-08-06

## 2018-08-08 PROBLEM — Z79.4 TYPE 2 DIABETES MELLITUS WITH KIDNEY COMPLICATION, WITH LONG-TERM CURRENT USE OF INSULIN (HCC): Status: ACTIVE | Noted: 2017-02-13

## 2018-08-08 PROBLEM — E11.22 TYPE 2 DIABETES MELLITUS WITH STAGE 3 CHRONIC KIDNEY DISEASE, WITHOUT LONG-TERM CURRENT USE OF INSULIN (HCC): Status: ACTIVE | Noted: 2017-02-13

## 2018-08-08 PROBLEM — N18.30 CKD STAGE 3 DUE TO TYPE 2 DIABETES MELLITUS (HCC): Status: ACTIVE | Noted: 2017-05-08

## 2018-08-08 NOTE — PROGRESS NOTES
Subjective:   CC: Hospital discharge follow-up    HPI:     Perez Leone is a 63 y.o. Male with history of hypertension, hyperlipidemia, controlled type 2 diabetes, cardiovascular disease, obesity, CKD stage III who was recently admitted to the [facility] for cardiac implant by Dr. Nielson.  Records were not available for review at the time of the visit.  Patient is a poor historian and was not able to disclose information regarding the device as well as the hospital course.  He, however, is doing well after discharge.  The wound on left anterior chest are healing well.  He denies fever, chill, dizziness, chest pain, chest palpitation, shortness of breath, dyspnea on exertion, pedal edema.  Patient has appointment to follow-up with Dr. Nielson in a few weeks.    Patient is taking Lipitor 40 mg daily for hyperlipidemia.  He is tolerating the device well, denies any side effects.  Patient also takes Invokamet, Actos, and Victoza.  His last A1c was 7.7.  He also takes Moexipril, metoprolol for hypertension.    Per chart review, he had history of positive FIT.  He was referred to gastroenterology by PCP multiple times, however, patient does not seem to be aware of this.  He denies fever, chills, abdominal pain, hematochezia, melena, unexplained weight loss.    Current medicines (including changes today)  Current Outpatient Prescriptions   Medication Sig Dispense Refill   • VICTOZA 18 MG/3ML Solution Pen-injector injection Inject 0.3 mL as instructed every day. 9 PEN 0   • INVOKAMET XR  MG TABLET SR 24 HR TAKE ONE TABLET BY MOUTH TWICE DAILY 90 Tab 3   • FREESTYLE LITE strip USE 1 STRIP TO CHECK GLUCOSE TWICE DAILY AND AS NEEDED FOR  SIGNS  AND  SYMPTOMS  OF  HIGH  OR  LOW  BLOOD  SUGAR  *E11.22,  E11.65,  N19.3 100 Strip 3   • metoprolol SR (TOPROL XL) 100 MG TABLET SR 24 HR TAKE ONE TABLET BY MOUTH ONCE DAILY 90 Tab 3   • VICTOZA 18 MG/3ML Solution Pen-injector injection INJECT 0.3 ML (1.8 MG) ONCE DAILY  AS DIRECTED 9 PEN 3   • Blood Glucose Monitoring Suppl Supplies Misc Test strips order: Test strips for Freestyle Lite meter. Sig: use twice a day and prn ssx high or low sugar 100 Each 3   • FREESTYLE LANCETS [facility] Use one to check blood sugar once a day or any time when he has low sugar symptoms. 100 Each 3   • moexipril (UNIVASC) 7.5 MG Tab Take 1 Tab by mouth every day. 90 Tab 3   • pioglitazone (ACTOS) 15 MG Tab TAKE ONE TABLET BY MOUTH ONCE DAILY 90 Tab 3   • Insulin Pen Needle 32G X 4 MM Misc 1 Applicator by Does not apply route 3 times a day. 100 Each 11   • Insulin Pen Needle (NOVOFINE PLUS) 32G X 4 MM Misc 1 Applicator by Does not apply route every day. Using one needle tip with victoza per day 100 Each 3   • Insulin Pen Needle (BD PEN NEEDLE ROSMERY U/F) 32G X 4 MM Misc 1 Applicator by Does not apply route 3 times a day, with meals. 100 Each 11   • NITROSTAT 0.4 MG SL Tab DISSOLVE ONE TABLET UNDER THE TONGUE EVERY 5 MINUTES AS NEEDED FOR CHEST PAIN.  DO NOT EXCEED A TOTAL OF 3 DOSES IN 15 MINUTES NOW 25 Tab 0   • atorvastatin (LIPITOR) 40 MG TABS Take 1 Tab by mouth every evening. 90 Each 1   • niacin SR (NIASPAN) 500 MG TBCR Take 1 Tab by mouth 3 times a day. 90 Each 6   • [facility] ASPIRIN 81 MG PO TBEC QAM.        No current facility-administered medications for this visit.      He  has a past medical history of Acute MI (HCC); Diabetes mellitus; Heme positive stool (6/8/2015); Hypertension; Overweight and obesity; and Pure hypercholesterolemia.    I personally reviewed patient's problem list, allergies, medications, family hx, social hx with patient and update [facility].     REVIEW OF SYSTEMS:  CONSTITUTIONAL:  Denies night sweats, fatigue, malaise, lethargy, fever or chills.  RESPIRATORY:  Denies cough, wheeze, hemoptysis, or shortness of breath.  CARDIOVASCULAR:  Denies chest pains, palpitations, pedal edema     Objective:     Blood pressure 126/86, pulse 86, temperature 37 °C (98.6 °F), resp. rate 12,  height 1.829 m (6'), weight (!) 133.7 kg (294 lb 12.8 oz), SpO2 94 %. Body mass index is 39.98 kg/m².    Physical Exam:  Constitutional: awake, alert, in no distress, obese.  Skin: Warm, dry, good turgor, no rashes, bruises, ulcers in visible areas.  -7 cm horizontal scar on left anterior chest with minimal surrounding erythema, negative tenderness or discharge from the wound.  Cardiac implant palpable underneath the scar.  Neck: Trachea midline, no masses, no thyromegaly. No cervical or supraclavicular lymphadenopathy  Respiratory: Unlabored respiratory effort, lungs clear to auscultation, no wheezes, no rales.  Cardiovascular: Normal S1, S2, no murmur, no pedal edema.  Abdomen: Soft, non-tender to palpation, active BS, no hernia, no hepatosplenomegaly, negative rebound or guarding.   Psych: Oriented x3, affect and mood wnl, intact judgement and insight.       Assessment and Plan:   The following treatment plan was discussed    1. Hospital discharge follow-up  2. Presence of other cardiac implants and grafts  Patient is a 63-year-old male with history of type 2 diabetes, hypertension, hyperlipidemia, CKD stage III, obesity, and CAD, status post PCI in September 2013.  Echocardiogram done in October 2013 was notable for EF of 45%, aortic root dilation (4.4 cm), mild atrial insufficiency, apical hypokinesis. Repeat echocardiogram in November 2017 was notable for EF of 30-35% without significant valvular problems.  Last echocardiogram in April 2018 was notable for mildly dilated left ventricle with moderately reduced EF of 30-35%, grade 1 diastolic dysfunction, mild MR, right ventricle was normal in size, function, and pressure.  Patient also had multiple cardiac stress tests from 2013 to 2017.  All did not reveal ischemia.  Patient was recently admitted to [place] for cardiac implantation.  Documentation was not available for review at the time of the visit.  Patient is poor historian.  Patient is unclear the type  of device inserted and the hospital course (possible AICD per chart review).  However, he reports doing well after discharge.  Left anterior chest surgical site appears to heal well on exam, negative evidence of wound infection.  Patient has appointment to follow-up with his cardiologist in a few weeks.  Patient continues to take all his medication as directed.  Plans:  -We will request DC summary from [place]  -Patient is advised to follow-up with PCP as instructed    3. Essential hypertension  Chronic, currently taking Moexipril and metoprolol, tolerating medication well, denies any side effects, blood pressure is 128/86 today.  Plans:  - continue Moexipril and metoprolol as directed  - Pt was counseled on dietary modification, weight loss, smoking cessation, and avoidance of excessive alcohol consumption.    - Recommended moderate intensity exercise at least 30 minutes per day x 5 days per week.    4. Dyslipidemia  Chronic, tolerating Lipitor 40 mg well, denies any side effects, will continue.    5. Positive FIT (fecal immunochemical test)  Per chart review, it appears that patient has positive FIT test.  He will referred to gastroenterology for consultation by PCP, however, patient is not aware of this.  Plans:  -Patient was advised to call GI consultant for appointment.  Phone numbers provided    7. Type 2 diabetes mellitus with stage 3 chronic kidney disease, without long-term current use of insulin (HCC)  Chronic, last A1c was 7.7, currently taking Victoza, Ivokamet, and Actos.  Denies hypoglycemia, concerning lesion on his feet, symptoms of polyneuropathy.  Patient is currently working with endocrinologist for diabetes.  Plans:  -Patient was advised to continue all diabetic medication  -Follow-up with endocrinologist at by directed  - Discussed dietary modification, exercise, weight loss  - Annual eye exam  - Regular foot exam  - Discussed prevention and management of hypoglycemia    Prolonged services  documentation:   I personally reviewed patient’s medical records after office visit with me on 8/6/2018  Date of service: 8/8/2018  Total time spent: 45 minutes.     Chely Vanessa M.D.      Followup: Return for As needed.    Please note that this dictation was created using voice recognition software. I have made every reasonable attempt to correct obvious errors, but I expect that there are errors of grammar and possibly content that I did not discover before finalizing the note.

## 2018-09-18 DIAGNOSIS — I25.10 CORONARY ARTERY DISEASE INVOLVING NATIVE CORONARY ARTERY OF NATIVE HEART WITHOUT ANGINA PECTORIS: ICD-10-CM

## 2018-09-20 RX ORDER — NITROGLYCERIN 0.4 MG/1
TABLET SUBLINGUAL
Qty: 30 TAB | Refills: 6 | Status: SHIPPED | OUTPATIENT
Start: 2018-09-20 | End: 2019-10-14 | Stop reason: SDUPTHER

## 2018-09-24 ENCOUNTER — OFFICE VISIT (OUTPATIENT)
Dept: ENDOCRINOLOGY | Facility: MEDICAL CENTER | Age: 63
End: 2018-09-24
Payer: MEDICARE

## 2018-09-24 VITALS
OXYGEN SATURATION: 98 % | DIASTOLIC BLOOD PRESSURE: 74 MMHG | BODY MASS INDEX: 40.09 KG/M2 | HEART RATE: 114 BPM | WEIGHT: 296 LBS | SYSTOLIC BLOOD PRESSURE: 100 MMHG | HEIGHT: 72 IN

## 2018-09-24 DIAGNOSIS — N18.30 TYPE 2 DIABETES MELLITUS WITH STAGE 3 CHRONIC KIDNEY DISEASE, WITHOUT LONG-TERM CURRENT USE OF INSULIN (HCC): ICD-10-CM

## 2018-09-24 DIAGNOSIS — E11.22 TYPE 2 DIABETES MELLITUS WITH STAGE 3 CHRONIC KIDNEY DISEASE, WITHOUT LONG-TERM CURRENT USE OF INSULIN (HCC): ICD-10-CM

## 2018-09-24 DIAGNOSIS — E11.65 TYPE 2 DIABETES MELLITUS WITH HYPERGLYCEMIA, WITHOUT LONG-TERM CURRENT USE OF INSULIN (HCC): ICD-10-CM

## 2018-09-24 LAB
HBA1C MFR BLD: 7.2 % (ref ?–5.8)
INT CON NEG: NORMAL
INT CON POS: NORMAL

## 2018-09-24 PROCEDURE — 83036 HEMOGLOBIN GLYCOSYLATED A1C: CPT | Performed by: PHYSICIAN ASSISTANT

## 2018-09-24 PROCEDURE — 99214 OFFICE O/P EST MOD 30 MIN: CPT | Performed by: PHYSICIAN ASSISTANT

## 2018-09-24 RX ORDER — LIRAGLUTIDE 6 MG/ML
1.8 INJECTION SUBCUTANEOUS DAILY
Qty: 9 PEN | Refills: 4 | Status: SHIPPED | OUTPATIENT
Start: 2018-09-24 | End: 2019-05-06 | Stop reason: SDUPTHER

## 2018-09-24 RX ORDER — PIOGLITAZONEHYDROCHLORIDE 15 MG/1
15 TABLET ORAL DAILY
Qty: 90 TAB | Refills: 3 | Status: SHIPPED | OUTPATIENT
Start: 2018-09-24 | End: 2019-05-22 | Stop reason: SDUPTHER

## 2018-09-24 NOTE — PROGRESS NOTES
Return to office Patient Consult Note  Referred by: Elle Cooper M.D.    Reason for consult: Diabetes Management Type 2    HPI:  Perez Leone is a 63 y.o. old patient who is seeing us today for diabetes care.  This is a pleasant patient with diabetes and I appreciate the opportunity to participate in the care of this patient.    Pacer placed two months ago.      HbA1c on 9/24/18 7.2  HbA1c on 3/26/18 was 7.7  HbA1c 0n 6/26/17  is 6.5  Labs of 1/9/2017 he has a Cr. Of 1.1, HbA1c of 8.1, GFR 66, LDL 75, HDL 34,     BG Diary:9/24/2018  In the AM:  doing very well     BG Diary:3/26/2018  In the AM: 123, 136, 132, 131, 130, 130  Before Bed: 182, 193, 148, 105, 184, 183     Weight: He was on 2/13/17 306 pounds and today he is 272      1. Type 2 diabetes mellitus with stage 3 chronic kidney disease, without long-term current use of insulin ([facility])    Now on:  1.   Victoza 1.8 injection at night    2.   Synjardy 12.5/1000 one in AM one in PM  3.   Actos 15mg qday start on 3/1/17     He is doing very well,  He is off insulin has lost 32 pounds in three month his BG numbers are perfect.      His BP is also becoming better and we will have to consider decreasing BP meds.          ROS:   Constitutional: No night sweats.  Eyes:  No visual changes.  Cardiac: No chest pain, No palpitations or racing heart rate.  Resp: No shortness of breath, No cough,   Gi: No Diarrhea    All other systems were reviewed and were/are negative.  The ROS was revised/revisited during this office visit from the patients first office visit with me on 2/13/17 Please review the full ROS during the first office visit.    Past Medical History:  Patient Active Problem List    Diagnosis Date Noted   • Presence of other cardiac implants and grafts 08/06/2018   • CKD stage 3 due to type 2 diabetes mellitus (HCC) 05/08/2017   • Positive FIT (fecal immunochemical test) 05/08/2017   • Type 2 diabetes mellitus with stage 3 chronic kidney disease, without  long-term current use of insulin (HCC) 02/13/2017   • Dyslipidemia 07/11/2016   • Essential hypertension 07/11/2016   • Heme positive stool 06/08/2015   • History of acute anterior wall myocardial infarction 03/09/2012   • CAD BMS LAD 03/09/2012   • Obesity 03/09/2012       Past Surgical History:  No past surgical history on file.    Allergies:  Patient has no known allergies.    Social History:  Social History     Social History   • Marital status: Single     Spouse name: N/A   • Number of children: N/A   • Years of education: N/A     Occupational History   • Not on file.     Social History Main Topics   • Smoking status: Never Smoker   • Smokeless tobacco: Never Used   • Alcohol use No   • Drug use: No   • Sexual activity: No     Other Topics Concern   • Not on file     Social History Narrative    Single    Works at [employer] as a        Family History:  Family History   Problem Relation Age of Onset   • Heart Disease Mother         CHF   • Diabetes Brother    • Other Father         son does not father's med hx       Medications:    Current Outpatient Prescriptions:   •  VICTOZA 18 MG/3ML Solution Pen-injector injection, Inject 0.3 mL as instructed every day., Disp: 9 PEN, Rfl: 4  •  pioglitazone (ACTOS) 15 MG Tab, Take 1 Tab by mouth every day., Disp: 90 Tab, Rfl: 3  •  Canagliflozin-Metformin HCl ER (INVOKAMET XR)  MG TABLET SR 24 HR, Take 1 Tab by mouth 2 Times a Day. TWICE DAILY, Disp: 60 Tab, Rfl: 11  •  FREESTYLE LITE strip, USE 1 STRIP TO CHECK GLUCOSE TWICE DAILY AND AS NEEDED FOR  SIGNS  AND  SYMPTOMS  OF  HIGH  OR  LOW  BLOOD  SUGAR  *E11.22,  E11.65,  N19.3, Disp: 100 Strip, Rfl: 3  •  metoprolol SR (TOPROL XL) 100 MG TABLET SR 24 HR, TAKE ONE TABLET BY MOUTH ONCE DAILY, Disp: 90 Tab, Rfl: 3  •  Blood Glucose Monitoring Suppl Supplies Misc, Test strips order: Test strips for Freestyle Lite meter. Sig: use twice a day and prn ssx high or low sugar, Disp: 100 Each,  "Rfl: 3  •  FREESTYLE LANCETS Misc, Use one to check blood sugar once a day or any time when he has low sugar symptoms., Disp: 100 Each, Rfl: 3  •  moexipril (UNIVASC) 7.5 MG Tab, Take 1 Tab by mouth every day., Disp: 90 Tab, Rfl: 3  •  Insulin Pen Needle (NOVOFINE PLUS) 32G X 4 MM Misc, 1 Applicator by Does not apply route every day. Using one needle tip with victoza per day, Disp: 100 Each, Rfl: 3  •  Insulin Pen Needle (BD PEN NEEDLE ROSMERY U/F) 32G X 4 MM Misc, 1 Applicator by Does not apply route 3 times a day, with meals., Disp: 100 Each, Rfl: 11  •  atorvastatin (LIPITOR) 40 MG TABS, Take 1 Tab by mouth every evening., Disp: 90 Each, Rfl: 1  •  niacin SR (NIASPAN) 500 MG TBCR, Take 1 Tab by mouth 3 times a day., Disp: 90 Each, Rfl: 6  •  [facility] ASPIRIN 81 MG PO TBEC, QAM. , Disp: , Rfl:   •  nitroglycerin (NITROSTAT) 0.4 MG SL Tab, DISSOLVE ONE TABLET UNDER THE TONGUE EVERY 5 MINUTES AS NEEDED FOR CHEST PAIN.  DO NOT EXCEED A TOTAL OF 3 DOSES IN 15 MINUTES NOW, Disp: 30 Tab, Rfl: 6        Physical Examination:   Vital signs: /74 (BP Location: Left arm, Patient Position: Sitting, BP Cuff Size: Large adult)   Pulse (!) 114   Ht 1.829 m (6' 0.01\")   Wt (!) 134.3 kg (296 lb)   SpO2 98%   BMI 40.14 kg/m²   General: No distress, cooperative, well dressed and well nourished.   Eyes: No scleral icterus or discharge, No hyposphagma  ENMT: Normal on external inspection of nose, lips, No nasal drainage   Neck: No abnormal masses on inspection  Resp: Normal effort, Bilateral clear to auscultation, No wheezing, No rales  CVS: Regular rate and rhythm, S1 S2 normal, No murmur. No gallop  Extremities: No edema bilateral extremities  Neuro: Alert and oriented  Skin: No rash, No Ulcers  Psych: Normal mood and affect      Assessment and Plan:    1. Type 2 diabetes mellitus with stage 3 chronic kidney disease, without long-term current use of insulin (HCC)    Now on:  1.   Victoza 1.8 injection at night    2. "   Synjardy/Invokamet 12.5/1000 one in AM one in PM  3.   Actos 15mg qday start on 3/1/17    Return in about 1 year (around 9/24/2019).    Blood glucose log: Check BG in the morning when wake up, before lunch or dinner and before bed.  So three times a day.  Always bring BG diary to the next office visit.     Thank you kindly for allowing me to participate in the diabetes care plan for this patient.    Vish Mota PA-C, [facility]  Board Certified - Advanced Diabetes Management  09/24/18    CC:   Elle Cooper M.D.

## 2018-09-24 NOTE — PATIENT INSTRUCTIONS
Now on:  1.   Victoza 1.8 injection at night    2.   Synjardy/Invokamet 12.5/1000 one in AM one in PM  3.   Actos 15mg qday start on 3/1/17

## 2018-12-08 NOTE — PATIENT INSTRUCTIONS
[facility]   [address] [place] [id number]  Phone: [phone]  Fax: [phone]   Private car

## 2018-12-10 ENCOUNTER — OFFICE VISIT (OUTPATIENT)
Dept: MEDICAL GROUP | Age: 63
End: 2018-12-10
Payer: MEDICARE

## 2018-12-10 VITALS
DIASTOLIC BLOOD PRESSURE: 68 MMHG | BODY MASS INDEX: 40.52 KG/M2 | WEIGHT: 299.2 LBS | HEIGHT: 72 IN | OXYGEN SATURATION: 95 % | HEART RATE: 96 BPM | SYSTOLIC BLOOD PRESSURE: 110 MMHG | TEMPERATURE: 97.5 F

## 2018-12-10 DIAGNOSIS — E11.22 TYPE 2 DIABETES MELLITUS WITH STAGE 3 CHRONIC KIDNEY DISEASE, WITHOUT LONG-TERM CURRENT USE OF INSULIN (HCC): ICD-10-CM

## 2018-12-10 DIAGNOSIS — Z95.818 PRESENCE OF OTHER CARDIAC IMPLANTS AND GRAFTS: ICD-10-CM

## 2018-12-10 DIAGNOSIS — E78.5 DYSLIPIDEMIA: ICD-10-CM

## 2018-12-10 DIAGNOSIS — I10 ESSENTIAL HYPERTENSION: ICD-10-CM

## 2018-12-10 DIAGNOSIS — R19.5 POSITIVE FIT (FECAL IMMUNOCHEMICAL TEST): ICD-10-CM

## 2018-12-10 DIAGNOSIS — N18.30 TYPE 2 DIABETES MELLITUS WITH STAGE 3 CHRONIC KIDNEY DISEASE, WITHOUT LONG-TERM CURRENT USE OF INSULIN (HCC): ICD-10-CM

## 2018-12-10 PROCEDURE — 99214 OFFICE O/P EST MOD 30 MIN: CPT | Performed by: INTERNAL MEDICINE

## 2018-12-10 NOTE — ASSESSMENT & PLAN NOTE
Patient has ICD placed on 7/19/18.  He was seen by his cardiologist Dr. Sung as [place] on 8/9/18.  Patient stated that he has follow-up appointment with cardiologist on 12/31/18.  He denied chest pain or shortness of breath.  He is taking aspirin 81 mg daily, metoprolol  mg daily and lisinopril 7.5 mg daily.  He also takes atorvastatin 40 mg every evening.

## 2018-12-10 NOTE — ASSESSMENT & PLAN NOTE
Patient is taking metoprolol  mg daily and moexipril 7.5 mg daily.  She denies side effects from taking metoprolol and moexipril.  His blood pressure and pulse are stable and well controlled.

## 2018-12-10 NOTE — ASSESSMENT & PLAN NOTE
Patient has positive stool fit test on 4/9/17.  I referred him to GI consultant twice.  He stated that his brother with visit him next year so that he can ask his brother for transportation for colonoscopy.  He declined to do colonoscopy for this year.  I reprinted the contact information of GI consultant for patient.  Patient is advised to call to schedule with GI consultant when he is ready.  He denies abdominal pain or blood in stool or taking over-the-counter NSAIDs.

## 2018-12-10 NOTE — ASSESSMENT & PLAN NOTE
Patient reported that he is taking Invokamet XR  mg twice daily, Victoza 1.8 mg daily and Actos 15 mg daily as prescribed by endocrinologist.  He denies side effects from taking his diabetes medications and denied hypoglycemia at home.  His last eye exam was on 10/15/18.  Last A1c was 7.2 on 9/24/18.

## 2018-12-10 NOTE — ASSESSMENT & PLAN NOTE
Patient is taking atorvastatin 40 mg every evening and niacin 500 mg 3 tablets nightly as instructed by cardiologist.  He denies side effects from taking atorvastatin and niacin.  He has normal liver enzymes in the previous blood test.  His cholesterol is stable and well controlled.  I discussed his previous blood test with him in clinic today.  I ordered blood tests for him in previous visit but he has not done yet.  Patient will repeat the blood work in 3 months before follow-up.    Results for GEGE EFREN NAIR (MRN [id number]) as of 12/10/2018 12:22   Ref. Range 4/30/2018 00:00   Cholesterol,Tot Latest Ref Range: 100 - 199 mg/dL 120   Triglycerides Latest Ref Range: 0 - 149 mg/dL 102   HDL Latest Ref Range: >39 mg/dL 38 (L)   LDL Latest Ref Range: 0 - 99 mg/dL 62   VLDL Cholesterol Calc Latest Ref Range: 5 - 40 mg/dL 20

## 2018-12-10 NOTE — PROGRESS NOTES
Subjective:   Perez Leone is a 63 y.o. male here today for evaluation and management of:      Type 2 diabetes mellitus with stage 3 chronic kidney disease, without long-term current use of insulin ([facility])  Patient reported that he is taking Invokamet XR  mg twice daily, Victoza 1.8 mg daily and Actos 15 mg daily as prescribed by endocrinologist.  He denies side effects from taking his diabetes medications and denied hypoglycemia at home.  His last eye exam was on 10/15/18.  Last A1c was 7.2 on 9/24/18.    Presence of other cardiac implants and grafts  Patient has ICD placed on 7/19/18.  He was seen by his cardiologist Dr. Sung as [place] on 8/9/18.  Patient stated that he has follow-up appointment with cardiologist on 12/31/18.  He denied chest pain or shortness of breath.  He is taking aspirin 81 mg daily, metoprolol  mg daily and lisinopril 7.5 mg daily.  He also takes atorvastatin 40 mg every evening.    Positive FIT (fecal immunochemical test)  Patient has positive stool fit test on 4/9/17.  I referred him to GI consultant twice.  He stated that his brother with visit him next year so that he can ask his brother for transportation for colonoscopy.  He declined to do colonoscopy for this year.  I reprinted the contact information of GI consultant for patient.  Patient is advised to call to schedule with GI consultant when he is ready.  He denies abdominal pain or blood in stool or taking over-the-counter NSAIDs.    Essential hypertension  Patient is taking metoprolol  mg daily and moexipril 7.5 mg daily.  She denies side effects from taking metoprolol and moexipril.  His blood pressure and pulse are stable and well controlled.    Dyslipidemia  Patient is taking atorvastatin 40 mg every evening and niacin 500 mg 3 tablets nightly as instructed by cardiologist.  He denies side effects from taking atorvastatin and niacin.  He has normal liver enzymes in the previous blood test.  His  cholesterol is stable and well controlled.  I discussed his previous blood test with him in clinic today.  I ordered blood tests for him in previous visit but he has not done yet.  Patient will repeat the blood work in 3 months before follow-up.    Results for EFREN DE GUZMAN (MRN [id number]) as of 12/10/2018 12:22   Ref. Range 4/30/2018 00:00   Cholesterol,Tot Latest Ref Range: 100 - 199 mg/dL 120   Triglycerides Latest Ref Range: 0 - 149 mg/dL 102   HDL Latest Ref Range: >39 mg/dL 38 (L)   LDL Latest Ref Range: 0 - 99 mg/dL 62   VLDL Cholesterol Calc Latest Ref Range: 5 - 40 mg/dL 20          Current medicines (including changes today)  Current Outpatient Prescriptions   Medication Sig Dispense Refill   • VICTOZA 18 MG/3ML Solution Pen-injector injection Inject 0.3 mL as instructed every day. 9 PEN 4   • pioglitazone (ACTOS) 15 MG Tab Take 1 Tab by mouth every day. 90 Tab 3   • Canagliflozin-Metformin HCl ER (INVOKAMET XR)  MG TABLET SR 24 HR Take 1 Tab by mouth 2 Times a Day. TWICE DAILY 60 Tab 11   • nitroglycerin (NITROSTAT) 0.4 MG SL Tab DISSOLVE ONE TABLET UNDER THE TONGUE EVERY 5 MINUTES AS NEEDED FOR CHEST PAIN.  DO NOT EXCEED A TOTAL OF 3 DOSES IN 15 MINUTES NOW 30 Tab 6   • FREESTYLE LITE strip USE 1 STRIP TO CHECK GLUCOSE TWICE DAILY AND AS NEEDED FOR  SIGNS  AND  SYMPTOMS  OF  HIGH  OR  LOW  BLOOD  SUGAR  *E11.22,  E11.65,  N19.3 100 Strip 3   • metoprolol SR (TOPROL XL) 100 MG TABLET SR 24 HR TAKE ONE TABLET BY MOUTH ONCE DAILY 90 Tab 3   • Blood Glucose Monitoring Suppl Supplies Misc Test strips order: Test strips for Freestyle Lite meter. Sig: use twice a day and prn ssx high or low sugar 100 Each 3   • FREESTYLE LANCETS [facility] Use one to check blood sugar once a day or any time when he has low sugar symptoms. 100 Each 3   • moexipril (UNIVASC) 7.5 MG Tab Take 1 Tab by mouth every day. 90 Tab 3   • Insulin Pen Needle (NOVOFINE PLUS) 32G X 4 MM Misc 1 Applicator by Does not apply route every day.  Using one needle tip with victoza per day 100 Each 3   • Insulin Pen Needle (BD PEN NEEDLE ROSMERY U/F) 32G X 4 MM Misc 1 Applicator by Does not apply route 3 times a day, with meals. 100 Each 11   • atorvastatin (LIPITOR) 40 MG TABS Take 1 Tab by mouth every evening. 90 Each 1   • niacin SR (NIASPAN) 500 MG TBCR Take 1 Tab by mouth 3 times a day. 90 Each 6   • [facility] ASPIRIN 81 MG PO TBEC QAM.        No current facility-administered medications for this visit.      He  has a past medical history of Acute MI (HCC); Diabetes mellitus; Heme positive stool (6/8/2015); Hypertension; Overweight and obesity; and Pure hypercholesterolemia.    ROS   No chest pain, no shortness of breath, no abdominal pain       Objective:     Blood pressure 110/68, pulse 96, temperature 36.4 °C (97.5 °F), temperature source Temporal, height 1.829 m (6'), weight (!) 135.7 kg (299 lb 3.2 oz), SpO2 95 %. Body mass index is 40.58 kg/m².   Physical Exam:  General: Alert, oriented and no acute distress.  Eye contact is good, speech goal directed, affect calm  HEENT: conjunctiva non-injected, sclera non-icteric.  Oral mucous membranes pink and moist with no lesions.  Pinna normal.   Lungs: Normal respiratory effort, clear to auscultation bilaterally with good excursion.  CV: regular rate and rhythm. No murmurs.   Abdomen: soft, non distended, nontender, Bowel sound normal.  Ext: no edema, color normal, vascularity normal, temperature normal        Assessment and Plan:   The following treatment plan was discussed     1. Type 2 diabetes mellitus with stage 3 chronic kidney disease, without long-term current use of insulin (HCC)  - Well-controlled. Continue current regimens as prescribed by the endocrinologist. Recheck lab 1-2 weeks before next follow up visit.  - Counseled to comply with medication and diet.   - Counseled signs and symptoms of hypoglycemia and management of hypoglycemia.   - Recommend to have retinal eye exam once a year.  - Advised  to check both feet daily.  - HEMOGLOBIN A1C; Future    2. Presence of other cardiac implants and grafts  - Stable.  Continue current regimens.  Advised to follow with cardiologist as scheduled on 12/31/18.    3. Essential hypertension  - Well-controlled. Continue current regimens as discussed in HPI.  Please see HPI for discussion.  Recheck lab 1-2 weeks before next follow up visit.  - Reviewed the risks and benefits as well as potential side effects of medications with patient.  - Discussed to eat low-sodium diet and encouraged to do regular physical exercise.  - Recommend to monitor blood pressure and heart rate at home.    4. Dyslipidemia  - Well-controlled. Continue current regimens, atorvastatin 40 mg every evening. Recheck lab 1-2 weeks before next follow up visit.  - Reviewed the risks and benefits of treatment and potential side effects of medication.  - Advised to eat low fat, low carbohydrate and high fiber diet as well as do cardio physical exercise regularly.    5. Positive FIT (fecal immunochemical test)  - I discussed with patient to follow-up with GI for further workup on positive fit test.  Patient declined to see GI this year.  He states that he will schedule with GI next year when his brother visits him.  I reprinted contact information of GI consultant for patient.  He is advised to schedule with GI consultant as soon as he can.  Patient is advised to avoid taking NSAIDs.  Patient denies abdominal pain or anorexia or nausea or blood in stool or black tarry stool.  Patient is advised to seek urgent medical attention if he has bloody bowel movement.      Followup: Return in about 6 months (around 6/10/2019), or if symptoms worsen or fail to improve, for Diabetes, Hypertension, Hyperlipidemia, Lab review.      Please note that this dictation was created using voice recognition software. I have made every reasonable attempt to correct obvious errors, but I expect that there may have unintended errors  in text, spelling, punctuation, or grammar that I did not discover.

## 2019-02-13 RX ORDER — LANCETS 28 GAUGE
EACH MISCELLANEOUS
Qty: 100 EACH | Refills: 0 | Status: SHIPPED | OUTPATIENT
Start: 2019-02-13 | End: 2019-04-04 | Stop reason: SDUPTHER

## 2019-02-13 RX ORDER — BLOOD-GLUCOSE METER
KIT MISCELLANEOUS
Qty: 100 STRIP | Refills: 3 | Status: SHIPPED | OUTPATIENT
Start: 2019-02-13 | End: 2019-10-06 | Stop reason: SDUPTHER

## 2019-02-27 NOTE — TELEPHONE ENCOUNTER
Patient needs a Prescription for Canagliflozin-Metformin HCl ER (INVOKAMET XR)  MG TABLET SR 24 HR sent to his [facility] he is out.

## 2019-03-06 ENCOUNTER — TELEPHONE (OUTPATIENT)
Dept: ENDOCRINOLOGY | Facility: MEDICAL CENTER | Age: 64
End: 2019-03-06

## 2019-03-06 NOTE — TELEPHONE ENCOUNTER
DOCUMENTATION OF PAR STATUS:    1. Name of Medication & Dose: Invokamet XR 50/500    2. Name of Prescription Coverage Company & phone #:   [employer] Part D at [phone]    3. Date Prior Auth Submitted: 3/6/19    4. What information was given to obtain insurance decision? Chart notes    5. Prior Auth Status? Pending    6. Action Taken: Pharmacy/Patient Notified: yes

## 2019-03-07 NOTE — TELEPHONE ENCOUNTER
FINAL PRIOR AUTHORIZATION STATUS:    1.  Name of Medication & Dose: Invokamet XR 50/500    2. Prior Auth Status (if approved--length of approval):  Approved    3. Action Taken: Pharmacy/Patient Notified: yes    Approval received by covermymeds. Awaiting for approval letter in the mail.

## 2019-04-04 DIAGNOSIS — E11.22 TYPE 2 DIABETES MELLITUS WITH STAGE 3 CHRONIC KIDNEY DISEASE, WITHOUT LONG-TERM CURRENT USE OF INSULIN (HCC): ICD-10-CM

## 2019-04-04 DIAGNOSIS — N18.30 TYPE 2 DIABETES MELLITUS WITH STAGE 3 CHRONIC KIDNEY DISEASE, WITHOUT LONG-TERM CURRENT USE OF INSULIN (HCC): ICD-10-CM

## 2019-04-04 RX ORDER — LANCETS 28 GAUGE
EACH MISCELLANEOUS
Qty: 100 EACH | Refills: 3 | Status: SHIPPED
Start: 2019-04-04 | End: 2020-02-29

## 2019-05-06 DIAGNOSIS — E11.65 TYPE 2 DIABETES MELLITUS WITH HYPERGLYCEMIA, WITHOUT LONG-TERM CURRENT USE OF INSULIN (HCC): ICD-10-CM

## 2019-05-06 RX ORDER — LIRAGLUTIDE 6 MG/ML
INJECTION SUBCUTANEOUS
Qty: 9 PEN | Refills: 3 | Status: SHIPPED | OUTPATIENT
Start: 2019-05-06 | End: 2019-08-25 | Stop reason: SDUPTHER

## 2019-05-06 RX ORDER — LIRAGLUTIDE 6 MG/ML
INJECTION SUBCUTANEOUS
Qty: 9 PEN | Refills: 3 | Status: SHIPPED | OUTPATIENT
Start: 2019-05-06 | End: 2019-05-22

## 2019-05-06 NOTE — TELEPHONE ENCOUNTER
Was the patient seen in the last year in this department? Yes OCT 2018    Does patient have an active prescription for medications requested? No     Received Request Via: Pharmacy     VICTOZA 18 MG/3ML Solution Pen-injector injection  INJECT 0.3 ML SUBCUTANEOUSLY ONCE DAILY

## 2019-05-06 NOTE — TELEPHONE ENCOUNTER
Was the patient seen in the last year in this department? Yes    Does patient have an active prescription for medications requested? No     Received Request Via: Pharmacy     VICTOZA 18 MG/3ML Solution Pen-injector injection    INJECT SUBCUTANEOUSLY 0.3 ML(1.8MG) ONCE DAILY AS DIRECTED

## 2019-05-15 LAB
ALBUMIN SERPL-MCNC: 4.1 G/DL (ref 3.6–4.8)
ALBUMIN/CREAT UR: 8.7 MG/G CREAT (ref 0–30)
ALBUMIN/GLOB SERPL: 1.5 {RATIO} (ref 1.2–2.2)
ALP SERPL-CCNC: 106 IU/L (ref 39–117)
ALT SERPL-CCNC: 20 IU/L (ref 0–44)
AST SERPL-CCNC: 18 IU/L (ref 0–40)
BILIRUB SERPL-MCNC: 0.7 MG/DL (ref 0–1.2)
BUN SERPL-MCNC: 18 MG/DL (ref 8–27)
BUN/CREAT SERPL: 11 (ref 10–24)
CALCIUM SERPL-MCNC: 9.6 MG/DL (ref 8.6–10.2)
CHLORIDE SERPL-SCNC: 105 MMOL/L (ref 96–106)
CHOLEST SERPL-MCNC: 136 MG/DL (ref 100–199)
CO2 SERPL-SCNC: 17 MMOL/L (ref 20–29)
CREAT SERPL-MCNC: 1.6 MG/DL (ref 0.76–1.27)
CREAT UR-MCNC: 90.6 MG/DL
GLOBULIN SER CALC-MCNC: 2.7 G/DL (ref 1.5–4.5)
GLUCOSE SERPL-MCNC: 160 MG/DL (ref 65–99)
HBA1C MFR BLD: 8.3 % (ref 4.8–5.6)
HDLC SERPL-MCNC: 41 MG/DL
LABORATORY COMMENT REPORT: NORMAL
LDLC SERPL CALC-MCNC: 75 MG/DL (ref 0–99)
MICROALBUMIN UR-MCNC: 7.9 UG/ML
POTASSIUM SERPL-SCNC: 4.8 MMOL/L (ref 3.5–5.2)
PROT SERPL-MCNC: 6.8 G/DL (ref 6–8.5)
SODIUM SERPL-SCNC: 140 MMOL/L (ref 134–144)
TRIGL SERPL-MCNC: 102 MG/DL (ref 0–149)
VLDLC SERPL CALC-MCNC: 20 MG/DL (ref 5–40)

## 2019-05-22 ENCOUNTER — OFFICE VISIT (OUTPATIENT)
Dept: MEDICAL GROUP | Age: 64
End: 2019-05-22
Payer: MEDICARE

## 2019-05-22 VITALS
HEART RATE: 101 BPM | WEIGHT: 311.2 LBS | OXYGEN SATURATION: 95 % | DIASTOLIC BLOOD PRESSURE: 68 MMHG | BODY MASS INDEX: 42.15 KG/M2 | HEIGHT: 72 IN | SYSTOLIC BLOOD PRESSURE: 112 MMHG | TEMPERATURE: 98.6 F

## 2019-05-22 DIAGNOSIS — E78.5 DYSLIPIDEMIA: ICD-10-CM

## 2019-05-22 DIAGNOSIS — E11.22 CKD STAGE 3 DUE TO TYPE 2 DIABETES MELLITUS (HCC): ICD-10-CM

## 2019-05-22 DIAGNOSIS — R19.5 POSITIVE FIT (FECAL IMMUNOCHEMICAL TEST): ICD-10-CM

## 2019-05-22 DIAGNOSIS — E66.01 MORBID OBESITY WITH BMI OF 40.0-44.9, ADULT (HCC): ICD-10-CM

## 2019-05-22 DIAGNOSIS — Z11.59 NEED FOR HEPATITIS C SCREENING TEST: ICD-10-CM

## 2019-05-22 DIAGNOSIS — N18.30 TYPE 2 DIABETES MELLITUS WITH STAGE 3 CHRONIC KIDNEY DISEASE, WITHOUT LONG-TERM CURRENT USE OF INSULIN (HCC): ICD-10-CM

## 2019-05-22 DIAGNOSIS — N18.30 CKD STAGE 3 DUE TO TYPE 2 DIABETES MELLITUS (HCC): ICD-10-CM

## 2019-05-22 DIAGNOSIS — Z11.59 NEED FOR HEPATITIS B SCREENING TEST: ICD-10-CM

## 2019-05-22 DIAGNOSIS — I10 ESSENTIAL HYPERTENSION: ICD-10-CM

## 2019-05-22 DIAGNOSIS — E11.22 TYPE 2 DIABETES MELLITUS WITH STAGE 3 CHRONIC KIDNEY DISEASE, WITHOUT LONG-TERM CURRENT USE OF INSULIN (HCC): ICD-10-CM

## 2019-05-22 PROCEDURE — 99214 OFFICE O/P EST MOD 30 MIN: CPT | Performed by: INTERNAL MEDICINE

## 2019-05-22 RX ORDER — PIOGLITAZONEHYDROCHLORIDE 30 MG/1
30 TABLET ORAL DAILY
Qty: 100 TAB | Refills: 3 | Status: SHIPPED | OUTPATIENT
Start: 2019-05-22 | End: 2020-02-05 | Stop reason: SDUPTHER

## 2019-05-22 ASSESSMENT — PATIENT HEALTH QUESTIONNAIRE - PHQ9: CLINICAL INTERPRETATION OF PHQ2 SCORE: 0

## 2019-05-22 ASSESSMENT — ENCOUNTER SYMPTOMS: GENERAL WELL-BEING: GOOD

## 2019-05-22 ASSESSMENT — ACTIVITIES OF DAILY LIVING (ADL): BATHING_REQUIRES_ASSISTANCE: 0

## 2019-05-22 ASSESSMENT — PAIN SCALES - GENERAL: PAINLEVEL: NO PAIN

## 2019-05-22 NOTE — PROGRESS NOTES
Subjective:   Perez De Guzman is a 64 y.o. male here today for evaluation and management of:      Type 2 diabetes mellitus with stage 3 chronic kidney disease, without long-term current use of insulin (HCC)  Chronic, poorly controlled. Recent blood work indicated increased A1c from 7.2 (9/24/18) to 8.3 (5/14/19) in addition to decreased GFR. He reports compliancy with pioglitazone 15 mg QD, victoza 18mg/3ml injection QD, and canagliflozin-metformin HCL ER  mg BID. He states that he has been eating a low carbohydrate and sugar diet. Denies leg edema. He does regularly drink soda. He has an appointment on 09/24/19 with Vish Mota (endocrinology).     Results for PEREZ DE GUZMAN (MRN [id number]) as of 5/22/2019 11:07   Ref. Range 11/7/2017 07:59 3/26/2018 10:40 9/24/2018 10:40 5/14/2019 07:42   Glycohemoglobin Latest Ref Range: 4.8 - 5.6 % 7.2 (H) 7.7 7.2 8.3 (H)       Results for PEREZ DE GUZMAN (MRN [id number]) as of 5/22/2019 11:07   Ref. Range 11/7/2017 07:59 4/30/2018 00:00 5/14/2019 07:42   GFR If Non  Latest Ref Range: >59 mL/min/1.73 60 50 (L) 45 (L)       Essential hypertension  Chronic. He reports compliancy with Metoprolol  mg QD and Moexipril 7.5 mg QD. He denies any side effects from these medications. BP in clinic today is 112/68.  He follows with Dr. Sung, cardiologist regularly.  He states that he has appointment with his cardiologist in June 2019.    Dyslipidemia  Chronic. Patient reports compliancy with niacin 5000 mg TID and atorvastatin 40 mg QD. He denies any side effects from medications. I reviewed recent blood work with him today.  His LDL cholesterol is stable and well controlled.    Results for PEREZ DE GUZMAN (MRN [id number]) as of 5/22/2019 11:07   Ref. Range 4/30/2018 00:00 5/14/2019 07:42   Cholesterol,Tot Latest Ref Range: 100 - 199 mg/dL 120 136   Triglycerides Latest Ref Range: 0 - 149 mg/dL 102 102   HDL Latest Ref Range: >39 mg/dL 38 (L) 41   LDL  Latest Ref Range: 0 - 99 mg/dL 62 75   VLDL Cholesterol Calc Latest Ref Range: 5 - 40 mg/dL 20 20       Positive FIT (fecal immunochemical test)  Patient had positive FIT test and I previously placed a referral to GI consultant for a colonoscopy. Patient states the referral was never processed and he has not been seen by GI. He denies abdominal pain, hematochezia, or melena.    Need for hepatitis B screening test and hepatitis C screening test  Patient denies previous blood transfusion, recreational drug use, and reports unknown familial history.  Patient was born in 1955.    CKD stage 3 due to type 2 diabetes mellitus (HCC)  Patient has chronic kidney disease stage III and his recent blood test on 5/14/2019 showed increase in creatinine from 1.47 to 1.6 and slightly decrease in GFR from 50 to 45.  Patient has poor control in his diabetes.  He also drinks soda every day.  Patient denied taking over-the-counter NSAIDs.  Patient denies urinary symptoms or flank pain.    Current medicines (including changes today)  Current Outpatient Prescriptions   Medication Sig Dispense Refill   • pioglitazone (ACTOS) 30 MG Tab Take 1 Tab by mouth every day. 100 Tab 3   • VICTOZA 18 MG/3ML Solution Pen-injector injection INJECT SUBCUTANEOUSLY 0.3 ML(1.8MG) ONCE DAILY AS DIRECTED 9 PEN 3   • FREESTYLE LANCETS Misc USE 1  TO CHECK GLUCOSE ONCE DAILY FOR  LOW  SUGAR  SYMPTOMS  *E11.22* 100 Each 3   • Canagliflozin-Metformin HCl ER (INVOKAMET XR)  MG TABLET SR 24 HR Take 1 Tab by mouth 2 Times a Day. TWICE DAILY 90 Tab 3   • FREESTYLE LITE strip USE 1 STRIP TO CHECK GLUCOSE TWICE DAILY AND AS NEEDED FOR  SIGNS  AND  SYMPTOMS  OF  HIGH  OR  LOW  BLOOD  SUGAR  *E11.22, E11.65, N19.3* 100 Strip 3   • nitroglycerin (NITROSTAT) 0.4 MG SL Tab DISSOLVE ONE TABLET UNDER THE TONGUE EVERY 5 MINUTES AS NEEDED FOR CHEST PAIN.  DO NOT EXCEED A TOTAL OF 3 DOSES IN 15 MINUTES NOW 30 Tab 6   • metoprolol SR (TOPROL XL) 100 MG TABLET SR 24 HR TAKE  ONE TABLET BY MOUTH ONCE DAILY 90 Tab 3   • Blood Glucose Monitoring Suppl Supplies Misc Test strips order: Test strips for Freestyle Lite meter. Sig: use twice a day and prn ssx high or low sugar 100 Each 3   • moexipril (UNIVASC) 7.5 MG Tab Take 1 Tab by mouth every day. 90 Tab 3   • Insulin Pen Needle (NOVOFINE PLUS) 32G X 4 MM Misc 1 Applicator by Does not apply route every day. Using one needle tip with victoza per day 100 Each 3   • atorvastatin (LIPITOR) 40 MG TABS Take 1 Tab by mouth every evening. 90 Each 1   • niacin SR (NIASPAN) 500 MG TBCR Take 1 Tab by mouth 3 times a day. 90 Each 6   • [facility] ASPIRIN 81 MG PO TBEC QAM.        No current facility-administered medications for this visit.      He  has a past medical history of Acute MI (HCC); Diabetes mellitus; Heme positive stool (6/8/2015); Hypertension; Overweight and obesity; and Pure hypercholesterolemia.    ROS   No chest pain, no shortness of breath, no abdominal pain       Objective:     /68 (BP Location: Right arm, Patient Position: Sitting, BP Cuff Size: Adult long)   Pulse (!) 101   Temp 37 °C (98.6 °F) (Temporal)   Ht 1.829 m (6')   Wt (!) 141.2 kg (311 lb 3.2 oz)   SpO2 95%  Body mass index is 42.21 kg/m².   Physical Exam:  General: Alert, oriented and no acute distress.  Eye contact is good, speech goal directed, affect calm  HEENT: conjunctiva non-injected, sclera non-icteric.  Oral mucous membranes pink and moist with no lesions.  Pinna normal.   Lungs: Normal respiratory effort, clear to auscultation bilaterally with good excursion.  CV: regular rate and rhythm. No murmurs.   Abdomen: soft, non distended, nontender, Bowel sound normal.  Ext: no edema, color normal, vascularity normal, temperature normal  Musculoskeletal exam: moving all extremities freely      Assessment and Plan:   The following treatment plan was discussed     1. Type 2 diabetes mellitus with stage 3 chronic kidney disease, without long-term current use of  insulin (HCC)  - Poorly controlled with A1c of 8.3. I increased pioglitaonze 15 mg QD to 30 mg QD. He should continue victoza 18mg/3ml injection QD and canagliflozi-metformin HCL ER  mg QD.   - He will be seen by Vish Mota (endocrinology) 9/24/19.   - Advised to eat low fat, low carbohydrate and low sugar diet as well as do cardio physical exercise regularly. Patient encouraged to stop drinking soda and increase water intake. Patient was highly encouraged to lose weight.  - Patient has upcoming eye appointment 10/2019.  - Counseled to comply with medication and diet.   - Counseled signs and symptoms of hypoglycemia and management of hypoglycemia.   - Recommend to have retinal eye exam once a year.  - Advised to check both feet daily.  - pioglitazone (ACTOS) 30 MG Tab; Take 1 Tab by mouth every day.  Dispense: 100 Tab; Refill: 3  - CBC WITH DIFFERENTIAL; Future  - MICROALBUMIN CREAT RATIO URINE; Future    2. Essential hypertension  - Well controlled. Patient to continue Metioprolol  mg QD and Moexipril 7.5 mg QD.  - Advised to eat low-sodium diet, avoid soda and increase water intake.  - Recommend to monitor blood pressure and heart rate at home.  - CBC WITH DIFFERENTIAL; Future  - Comp Metabolic Panel; Future    3. Dyslipidemia  - Well-controlled.  Patient to continue niacin 5000 mg QD and atorvastatin 40 mg QD.   - Patient currently weights 311 lbs, would like him to work on losing weight with ideal weight of 200 lbs.  - Advised to eat low fat, low carbohydrate and low sugar diet as well as do cardio physical exercise regularly. Patient encouraged to stop drinking soda and increase water intake. Patient was highly encouraged to lose weight.  - Comp Metabolic Panel; Future  - Lipid Profile; Future    4. Positive FIT (fecal immunochemical test)  - Previous positive FIT with referral to GI for colonoscopy. Patient encouraged to follow up with GI for colonoscopy. No current melena, hematochezia,  abdominal pain.  I placed the referral to GI consultant again and I provided contact information of GI consultant to patient today.  - REFERRAL TO GASTROENTEROLOGY    5. CKD stage 3 due to type 2 diabetes mellitus (HCC)  - Patient encouraged to lose weight, regulate diet, and increase water intake.  Patient is advised to avoid NSAIDs.  - Patient is advised to control blood pressure and diabetes better to prevent worsening kidney function.    6. Need for hepatitis B screening test  - No history of recreational drug use, blood transfusion, and unknown familial history. Patient agreeable to screening and potential hepatitis B vaccine.  - HEPATITIS B SURFACE ANTIGEN; Future    7. Need for hepatitis C screening test  - Patient needs screening test for HepC.  - HEP C VIRUS ANTIBODY; Future    8. Morbid obesity with BMI of 40.0-44.9, adult (HCC)  - Patient identified as having weight management issue.  Appropriate orders and counseling given. Patient 311 lbs with recent weight gain, patient highly encouraged to lose weight with diet modification and increased exercise.    Health Maintenance reviewed. Order for colonoscopy placed, see above.    Follow up: Return in about 6 months (around 11/22/2019), or if symptoms worsen or fail to improve, for Hypertension, Hyperlipidemia, Diabetes, CKD, Lab review.    I, Alejandra Martinez (Jorgeibjeannette), am scribing for, and in the presence of, Elle Cooper M.D..  ?   Electronically signed by: Alejandra Martinez (Vira), 5/22/2019  ?  I, Elle Cooper M.D., personally performed the services described in this documentation, as scribed by Alejandra Martinez in my presence, and it is both accurate and complete.    Please note that this dictation was created using voice recognition software. I have made every reasonable attempt to correct obvious errors, but I expect that there may have unintended errors in text, spelling, punctuation, or grammar that I did not discover.

## 2019-07-08 RX ORDER — PEN NEEDLE, DIABETIC 32GX 5/32"
NEEDLE, DISPOSABLE MISCELLANEOUS
Qty: 100 EACH | Refills: 11 | Status: SHIPPED | OUTPATIENT
Start: 2019-07-08 | End: 2020-08-31

## 2019-08-26 RX ORDER — LIRAGLUTIDE 6 MG/ML
INJECTION SUBCUTANEOUS
Qty: 9 PEN | Refills: 3 | Status: SHIPPED | OUTPATIENT
Start: 2019-08-26 | End: 2020-01-06

## 2019-08-26 NOTE — TELEPHONE ENCOUNTER
Was the patient seen in the last year in this department? Yes    Does patient have an active prescription for medications requested? No     Received Request Via: Pharmacy       Last seen 09/24/2018

## 2019-09-24 ENCOUNTER — OFFICE VISIT (OUTPATIENT)
Dept: ENDOCRINOLOGY | Facility: MEDICAL CENTER | Age: 64
End: 2019-09-24
Payer: MEDICARE

## 2019-09-24 VITALS
HEART RATE: 95 BPM | OXYGEN SATURATION: 98 % | BODY MASS INDEX: 41.17 KG/M2 | WEIGHT: 304 LBS | SYSTOLIC BLOOD PRESSURE: 118 MMHG | HEIGHT: 72 IN | DIASTOLIC BLOOD PRESSURE: 60 MMHG

## 2019-09-24 DIAGNOSIS — E11.22 TYPE 2 DIABETES MELLITUS WITH STAGE 3 CHRONIC KIDNEY DISEASE, WITHOUT LONG-TERM CURRENT USE OF INSULIN (HCC): ICD-10-CM

## 2019-09-24 DIAGNOSIS — I10 ESSENTIAL HYPERTENSION: ICD-10-CM

## 2019-09-24 DIAGNOSIS — N18.30 TYPE 2 DIABETES MELLITUS WITH STAGE 3 CHRONIC KIDNEY DISEASE, WITHOUT LONG-TERM CURRENT USE OF INSULIN (HCC): ICD-10-CM

## 2019-09-24 LAB
HBA1C MFR BLD: 6.9 % (ref 0–5.6)
INT CON NEG: NEGATIVE
INT CON POS: POSITIVE

## 2019-09-24 PROCEDURE — 83036 HEMOGLOBIN GLYCOSYLATED A1C: CPT | Performed by: PHYSICIAN ASSISTANT

## 2019-09-24 PROCEDURE — 99214 OFFICE O/P EST MOD 30 MIN: CPT | Performed by: PHYSICIAN ASSISTANT

## 2019-09-24 NOTE — PROGRESS NOTES
Return to office Patient Consult Note  Referred by: Elle Cooper M.D.    Reason for consult: Diabetes Management Type 2    HPI:  Perez Leone is a 64 y.o. old patient who is seeing us today for diabetes care.  This is a pleasant patient with diabetes and I appreciate the opportunity to participate in the care of this patient.    Labs of 9/24/2019 HbA1c is 6.9   HbA1c on 9/24/18 7.2  HbA1c on 3/26/18 was 7.7  HbA1c 0n 6/26/17  is 6.5  Labs of 1/9/2017 he has a Cr. Of 1.1, HbA1c of 8.1, GFR 66, LDL 75, HDL 34,     BG Diary:9/24/2019  In the AM:  129, 126, 126, 127, 113, 111  Before Bed: 183, 169, 167, 155, 169, 183, 163    BG Diary:3/26/2018  In the AM: 123, 136, 132, 131, 130, 130  Before Bed: 182, 193, 148, 105, 184, 183      Weight: He was on 2/13/17 306 pounds and today he is 272    1. Type 2 diabetes mellitus with stage 3 chronic kidney disease, without long-term current use of insulin ([facility])    Now on:  1.   Victoza 1.8 injection at night    2.   Synjardy 12.5/1000 one in AM one in PM  3.   Actos 15mg qday start on 3/1/17    2. Essential hypertension  This is stable today and no new changes are needed or required in today's visit      ROS:   Constitutional: No night sweats.  Eyes:  No visual changes.  Cardiac: No chest pain, No palpitations or racing heart rate.  Resp: No shortness of breath, No cough,   Gi: No Diarrhea    All other systems were reviewed and were/are negative.      Past Medical History:  Patient Active Problem List    Diagnosis Date Noted   • Presence of other cardiac implants and grafts 08/06/2018   • CKD stage 3 due to type 2 diabetes mellitus (HCC) 05/08/2017   • Positive FIT (fecal immunochemical test) 05/08/2017   • Type 2 diabetes mellitus with stage 3 chronic kidney disease, without long-term current use of insulin ([facility]) 02/13/2017   • Dyslipidemia 07/11/2016   • Essential hypertension 07/11/2016   • Heme positive stool 06/08/2015   • History of acute anterior wall myocardial  infarction 03/09/2012   • CAD BMS LAD 03/09/2012   • Morbid obesity with BMI of 40.0-44.9, adult ([facility]) 03/09/2012       Past Surgical History:  History reviewed. No pertinent surgical history.    Allergies:  Patient has no known allergies.    Social History:  Social History     Socioeconomic History   • Marital status: Single     Spouse name: Not on file   • Number of children: Not on file   • Years of education: Not on file   • Highest education level: Not on file   Occupational History   • Not on file   Social Needs   • Financial resource strain: Not on file   • Food insecurity:     Worry: Not on file     Inability: Not on file   • Transportation needs:     Medical: Not on file     Non-medical: Not on file   Tobacco Use   • Smoking status: Never Smoker   • Smokeless tobacco: Never Used   Substance and Sexual Activity   • Alcohol use: No     Alcohol/week: 0.0 oz   • Drug use: No   • Sexual activity: Never     Partners: Female   Lifestyle   • Physical activity:     Days per week: Not on file     Minutes per session: Not on file   • Stress: Not on file   Relationships   • Social connections:     Talks on phone: Not on file     Gets together: Not on file     Attends Sikhism service: Not on file     Active member of club or organization: Not on file     Attends meetings of clubs or organizations: Not on file     Relationship status: Not on file   • Intimate partner violence:     Fear of current or ex partner: Not on file     Emotionally abused: Not on file     Physically abused: Not on file     Forced sexual activity: Not on file   Other Topics Concern   • Not on file   Social History Narrative    Single    Works at the [employer] as a        Family History:  Family History   Problem Relation Age of Onset   • Heart Disease Mother         CHF   • Diabetes Brother    • Other Father         son does not father's med hx       Medications:    Current Outpatient Medications:   •  VICTOZA 18 MG/3ML  Solution Pen-injector injection, INJECT SUBCUTANEOUSLY 1.8 MG ONCE DAILY AS DIRECTED, Disp: 9 PEN, Rfl: 3  •  RELION PEN NEEDLES, USE NEEDLE THREE TIMES DAILY WITH INSULIN WITH MEALS, Disp: 100 Each, Rfl: 11  •  pioglitazone (ACTOS) 30 MG Tab, Take 1 Tab by mouth every day., Disp: 100 Tab, Rfl: 3  •  FREESTYLE LANCETS [facility] [place] USE 1  TO CHECK GLUCOSE ONCE DAILY FOR  LOW  SUGAR  SYMPTOMS  *E11.22*, Disp: 100 Each, Rfl: 3  •  Canagliflozin-Metformin HCl ER (INVOKAMET XR)  MG TABLET SR 24 HR, Take 1 Tab by mouth 2 Times a Day. TWICE DAILY, Disp: 90 Tab, Rfl: 3  •  FREESTYLE LITE strip, USE 1 STRIP TO CHECK GLUCOSE TWICE DAILY AND AS NEEDED FOR  SIGNS  AND  SYMPTOMS  OF  HIGH  OR  LOW  BLOOD  SUGAR  *E11.22, E11.65, N19.3*, Disp: 100 Strip, Rfl: 3  •  nitroglycerin (NITROSTAT) 0.4 MG SL Tab, DISSOLVE ONE TABLET UNDER THE TONGUE EVERY 5 MINUTES AS NEEDED FOR CHEST PAIN.  DO NOT EXCEED A TOTAL OF 3 DOSES IN 15 MINUTES NOW, Disp: 30 Tab, Rfl: 6  •  metoprolol SR (TOPROL XL) 100 MG TABLET SR 24 HR, TAKE ONE TABLET BY MOUTH ONCE DAILY, Disp: 90 Tab, Rfl: 3  •  Blood Glucose Monitoring Suppl Supplies Misc, Test strips order: Test strips for Freestyle Lite meter. Sig: use twice a day and prn ssx high or low sugar, Disp: 100 Each, Rfl: 3  •  moexipril (UNIVASC) 7.5 MG Tab, Take 1 Tab by mouth every day., Disp: 90 Tab, Rfl: 3  •  atorvastatin (LIPITOR) 40 MG TABS, Take 1 Tab by mouth every evening., Disp: 90 Each, Rfl: 1  •  niacin SR (NIASPAN) 500 MG TBCR, Take 1 Tab by mouth 3 times a day., Disp: 90 Each, Rfl: 6  •  [facility] ASPIRIN 81 MG PO TBEC, QAM. , Disp: , Rfl:         Physical Examination:   Vital signs: /60 (BP Location: Left arm, Patient Position: Sitting)   Pulse 95   Ht 1.829 m (6')   Wt (!) 137.9 kg (304 lb)   SpO2 98%   BMI 41.23 kg/m²   General: No distress, cooperative, well dressed and well nourished.   Eyes: No scleral icterus or discharge, No hyposphagma  ENMT: Normal on external inspection of  nose, lips, No nasal drainage   Neck: No abnormal masses on inspection  Resp: Normal effort, Bilateral clear to auscultation, No wheezing, No rales  CVS: Regular rate and rhythm, S1 S2 normal, No murmur. No gallop  Extremities: No edema bilateral extremities  Neuro: Alert and oriented  Skin: No rash, No Ulcers  Psych: Normal mood and affect      Assessment and Plan:    1. Type 2 diabetes mellitus with stage 3 chronic kidney disease, without long-term current use of insulin (HCC)    Now on:  1.   Victoza 1.8 injection at night    2.   Synjardy 12.5/1000 one in AM one in PM  3.   Actos 15mg qday start on 3/1/17    2. Essential hypertension  This is stable today and no new changes are needed or required in today's visit    No follow-ups on file.    Thank you kindly for allowing me to participate in the diabetes care plan for this patient.    Vish Mota PA-C, BC-ADM  Board Certified - Advanced Diabetes Management  09/24/19    CC:   Elle Cooper M.D.

## 2019-09-26 RX ORDER — CANAGLIFLOZIN AND METFORMIN HYDROCHLORIDE 50; 500 MG/1; MG/1
TABLET, FILM COATED, EXTENDED RELEASE ORAL
Qty: 60 TAB | Refills: 11 | Status: SHIPPED | OUTPATIENT
Start: 2019-09-26 | End: 2020-02-05

## 2019-09-26 NOTE — TELEPHONE ENCOUNTER
Was the patient seen in the last year in this department? Yes    Does patient have an active prescription for medications requested? No     Received Request Via: Pharmacy     Last seen 09/24/2019

## 2019-10-06 DIAGNOSIS — E11.22 TYPE 2 DIABETES MELLITUS WITH STAGE 3 CHRONIC KIDNEY DISEASE, WITHOUT LONG-TERM CURRENT USE OF INSULIN (HCC): ICD-10-CM

## 2019-10-06 DIAGNOSIS — N18.30 TYPE 2 DIABETES MELLITUS WITH STAGE 3 CHRONIC KIDNEY DISEASE, WITHOUT LONG-TERM CURRENT USE OF INSULIN (HCC): ICD-10-CM

## 2019-10-08 RX ORDER — BLOOD-GLUCOSE METER
KIT MISCELLANEOUS
Qty: 100 STRIP | Refills: 3 | Status: SHIPPED
Start: 2019-10-08 | End: 2019-10-09

## 2019-10-09 DIAGNOSIS — N18.30 TYPE 2 DIABETES MELLITUS WITH STAGE 3 CHRONIC KIDNEY DISEASE, WITHOUT LONG-TERM CURRENT USE OF INSULIN (HCC): ICD-10-CM

## 2019-10-09 DIAGNOSIS — E11.22 TYPE 2 DIABETES MELLITUS WITH STAGE 3 CHRONIC KIDNEY DISEASE, WITHOUT LONG-TERM CURRENT USE OF INSULIN (HCC): ICD-10-CM

## 2019-10-09 RX ORDER — BLOOD-GLUCOSE METER
KIT MISCELLANEOUS
Qty: 100 STRIP | Refills: 3 | Status: SHIPPED
Start: 2019-10-09 | End: 2020-03-10

## 2019-10-14 DIAGNOSIS — I25.10 CORONARY ARTERY DISEASE INVOLVING NATIVE CORONARY ARTERY OF NATIVE HEART WITHOUT ANGINA PECTORIS: ICD-10-CM

## 2019-10-14 RX ORDER — NITROGLYCERIN 0.4 MG/1
TABLET SUBLINGUAL
Qty: 30 TAB | Refills: 8 | Status: SHIPPED | OUTPATIENT
Start: 2019-10-14 | End: 2022-05-09

## 2019-11-12 LAB
ALBUMIN SERPL-MCNC: 3.8 G/DL (ref 3.6–4.8)
ALBUMIN/GLOB SERPL: 1.4 {RATIO} (ref 1.2–2.2)
ALP SERPL-CCNC: 91 IU/L (ref 39–117)
ALT SERPL-CCNC: 21 IU/L (ref 0–44)
AST SERPL-CCNC: 17 IU/L (ref 0–40)
BASOPHILS # BLD AUTO: 0.1 X10E3/UL (ref 0–0.2)
BASOPHILS NFR BLD AUTO: 1 %
BILIRUB SERPL-MCNC: 0.6 MG/DL (ref 0–1.2)
BUN SERPL-MCNC: 19 MG/DL (ref 8–27)
BUN/CREAT SERPL: 12 (ref 10–24)
CALCIUM SERPL-MCNC: 9.5 MG/DL (ref 8.6–10.2)
CHLORIDE SERPL-SCNC: 107 MMOL/L (ref 96–106)
CHOLEST SERPL-MCNC: 129 MG/DL (ref 100–199)
CO2 SERPL-SCNC: 17 MMOL/L (ref 20–29)
CREAT SERPL-MCNC: 1.53 MG/DL (ref 0.76–1.27)
EOSINOPHIL # BLD AUTO: 0.5 X10E3/UL (ref 0–0.4)
EOSINOPHIL NFR BLD AUTO: 7 %
ERYTHROCYTE [DISTWIDTH] IN BLOOD BY AUTOMATED COUNT: 14.4 % (ref 12.3–15.4)
GLOBULIN SER CALC-MCNC: 2.7 G/DL (ref 1.5–4.5)
GLUCOSE SERPL-MCNC: 137 MG/DL (ref 65–99)
HBV SURFACE AG SERPL QL IA: NEGATIVE
HCT VFR BLD AUTO: 40.5 % (ref 37.5–51)
HCV AB S/CO SERPL IA: <0.1 S/CO RATIO (ref 0–0.9)
HDLC SERPL-MCNC: 40 MG/DL
HGB BLD-MCNC: 13.6 G/DL (ref 13–17.7)
IMM GRANULOCYTES # BLD AUTO: 0 X10E3/UL (ref 0–0.1)
IMM GRANULOCYTES NFR BLD AUTO: 0 %
IMMATURE CELLS  115398: ABNORMAL
LABORATORY COMMENT REPORT: NORMAL
LDLC SERPL CALC-MCNC: 70 MG/DL (ref 0–99)
LYMPHOCYTES # BLD AUTO: 1.8 X10E3/UL (ref 0.7–3.1)
LYMPHOCYTES NFR BLD AUTO: 24 %
MCH RBC QN AUTO: 32.5 PG (ref 26.6–33)
MCHC RBC AUTO-ENTMCNC: 33.6 G/DL (ref 31.5–35.7)
MCV RBC AUTO: 97 FL (ref 79–97)
MICROALBUMIN UR-MCNC: 17.3 UG/ML
MONOCYTES # BLD AUTO: 0.8 X10E3/UL (ref 0.1–0.9)
MONOCYTES NFR BLD AUTO: 11 %
MORPHOLOGY BLD-IMP: ABNORMAL
NEUTROPHILS # BLD AUTO: 4.4 X10E3/UL (ref 1.4–7)
NEUTROPHILS NFR BLD AUTO: 57 %
NRBC BLD AUTO-RTO: ABNORMAL %
PLATELET # BLD AUTO: 230 X10E3/UL (ref 150–450)
POTASSIUM SERPL-SCNC: 4.8 MMOL/L (ref 3.5–5.2)
PROT SERPL-MCNC: 6.5 G/DL (ref 6–8.5)
RBC # BLD AUTO: 4.18 X10E6/UL (ref 4.14–5.8)
SODIUM SERPL-SCNC: 140 MMOL/L (ref 134–144)
TRIGL SERPL-MCNC: 97 MG/DL (ref 0–149)
VLDLC SERPL CALC-MCNC: 19 MG/DL (ref 5–40)
WBC # BLD AUTO: 7.7 X10E3/UL (ref 3.4–10.8)

## 2019-12-04 ENCOUNTER — OFFICE VISIT (OUTPATIENT)
Dept: MEDICAL GROUP | Age: 64
End: 2019-12-04
Payer: MEDICARE

## 2019-12-04 VITALS
WEIGHT: 304.2 LBS | OXYGEN SATURATION: 95 % | TEMPERATURE: 97.3 F | HEIGHT: 72 IN | DIASTOLIC BLOOD PRESSURE: 56 MMHG | SYSTOLIC BLOOD PRESSURE: 102 MMHG | HEART RATE: 96 BPM | BODY MASS INDEX: 41.2 KG/M2

## 2019-12-04 DIAGNOSIS — N18.30 TYPE 2 DIABETES MELLITUS WITH STAGE 3 CHRONIC KIDNEY DISEASE, WITHOUT LONG-TERM CURRENT USE OF INSULIN (HCC): ICD-10-CM

## 2019-12-04 DIAGNOSIS — I10 ESSENTIAL HYPERTENSION: ICD-10-CM

## 2019-12-04 DIAGNOSIS — R19.5 POSITIVE FIT (FECAL IMMUNOCHEMICAL TEST): ICD-10-CM

## 2019-12-04 DIAGNOSIS — E11.22 TYPE 2 DIABETES MELLITUS WITH STAGE 3 CHRONIC KIDNEY DISEASE, WITHOUT LONG-TERM CURRENT USE OF INSULIN (HCC): ICD-10-CM

## 2019-12-04 DIAGNOSIS — N18.30 CKD STAGE 3 DUE TO TYPE 2 DIABETES MELLITUS (HCC): ICD-10-CM

## 2019-12-04 DIAGNOSIS — E78.5 DYSLIPIDEMIA: ICD-10-CM

## 2019-12-04 DIAGNOSIS — E11.22 CKD STAGE 3 DUE TO TYPE 2 DIABETES MELLITUS (HCC): ICD-10-CM

## 2019-12-04 PROCEDURE — 99214 OFFICE O/P EST MOD 30 MIN: CPT | Performed by: INTERNAL MEDICINE

## 2019-12-04 ASSESSMENT — PAIN SCALES - GENERAL: PAINLEVEL: NO PAIN

## 2019-12-04 NOTE — PROGRESS NOTES
Subjective:   Perez Leone is a 64 y.o. male here today for evaluation and management of:    Dyslipidemia  Chronic. Patient reports compliancy with atorvastatin 40 mg QN and niacin 5000 mg TID and denies any associated side effects. Today we reviewed blood work from 11/11/19 which was stable with tot 129; tri 97; HDL 40; LDL 70. He denies any chest pain or claudication. He does not regularly exercise.    I reviewed recent blood work with the patient in clinic today.   Ref. Range 5/14/2019 07:42 11/11/2019 04:52   Cholesterol,Tot Latest Ref Range: 100 - 199 mg/dL 136 129   Triglycerides Latest Ref Range: 0 - 149 mg/dL 102 97   HDL Latest Ref Range: >39 mg/dL 41 40   LDL Latest Ref Range: 0 - 99 mg/dL 75 70   VLDL Cholesterol Calc Latest Ref Range: 5 - 40 mg/dL 20 19     Essential hypertension  Chronic. Patient reports compliancy with metoprolol  mg QD and moexipril 7.5 mg QD and denies any associated side effects. BP in clinic today is normal at 102/56. He denies any chest pain, headaches, lightheadedness, or lower extremity edema.    Type 2 diabetes mellitus with stage 3 chronic kidney disease, without long-term current use of insulin (HCC)  Chronic. Patient reports compliancy with pioglitazone 30 mg QD, victoza 1.8 mg injection QN, and canagliflozin-metformin HCL ER  mg BID and denies any associated side effects. He was seen by Vish Mota PA-C (Endocrinology) on 9/24/19, no changes made at that time. Today we reviewed blood work from 11/11/19 which showed improved A1c of 6.9. He denies any peripheral neuropathy, polyuria, polydipsia, or fatigue.     I reviewed recent blood work with the patient in clinic today.   Ref. Range 9/24/2018 10:40 5/14/2019 07:42 9/24/2019 10:55   Glucose Latest Ref Range: 65 - 99 mg/dL  160 (H)    Glycohemoglobin Latest Ref Range: 0.0 - 5.6 % 7.2 8.3 (H) 6.9 (A)     CKD stage 3 due to type 2 diabetes mellitus (HCC)  Chronic. Today we reviewed blood work from 11/11/19  which remained abnormal with creatinine 1.53, GFR 47. He is followed by Endocrinology and has improved control of his diabetes. He denies use of over the counter NSAID's. He denies any urinary symptoms or flank pain.    I reviewed recent blood work with the patient in clinic today.   Ref. Range 4/30/2018 00:00 5/14/2019 07:42 11/11/2019 04:52   Creatinine Latest Ref Range: 0.76 - 1.27 mg/dL 1.47 (H) 1.60 (H) 1.53 (H)   GFR If Non  Latest Ref Range: >59 mL/min/1.73 50 (L) 45 (L) 47 (L)   Bun-Creatinine Ratio Latest Ref Range: 10 - 24  18 11 12     Positive FIT (fecal immunochemical test)  Patient has recent positive FIT test, which we have discussed at previous appointments. I have previously referred him to GI consultant for a colonoscopy, however he has not yet scheduled. I have reprinted the GI referral for him today. Patient denies any abdominal pain, hematochezia, or melena. He denies familial history of colon cancer.      Current medicines (including changes today)  Current Outpatient Medications   Medication Sig Dispense Refill   • nitroglycerin (NITROSTAT) 0.4 MG SL Tab DISSOLVE ONE TABLET UNDER THE TONGUE EVERY 5 MINUTES AS NEEDED FOR CHEST PAIN.  DO NOT EXCEED A TOTAL OF 3 DOSES IN 15 MINUTES 30 Tab 8   • FREESTYLE LITE strip USE 1 STRIP TO CHECK GLUCOSE TWICE DAILY AS NEEDED FOR SIGNS AND SYMPTOMS OF HIGH OR LOW SUGAR *E11.22* 100 Strip 3   • INVOKAMET XR  MG TABLET SR 24 HR TAKE 1 TABLET BY MOUTH TWICE DAILY 60 Tab 11   • VICTOZA 18 MG/3ML Solution Pen-injector injection INJECT SUBCUTANEOUSLY 1.8 MG ONCE DAILY AS DIRECTED 9 PEN 3   • RELION PEN NEEDLES USE NEEDLE THREE TIMES DAILY WITH INSULIN WITH MEALS 100 Each 11   • pioglitazone (ACTOS) 30 MG Tab Take 1 Tab by mouth every day. 100 Tab 3   • FREESTYLE LANCETS Misc USE 1  TO CHECK GLUCOSE ONCE DAILY FOR  LOW  SUGAR  SYMPTOMS  *E11.22* 100 Each 3   • metoprolol SR (TOPROL XL) 100 MG TABLET SR 24 HR TAKE ONE TABLET BY MOUTH ONCE  DAILY 90 Tab 3   • Blood Glucose Monitoring Suppl Supplies Misc Test strips order: Test strips for Freestyle Lite meter. Sig: use twice a day and prn ssx high or low sugar 100 Each 3   • moexipril (UNIVASC) 7.5 MG Tab Take 1 Tab by mouth every day. 90 Tab 3   • atorvastatin (LIPITOR) 40 MG TABS Take 1 Tab by mouth every evening. 90 Each 1   • niacin SR (NIASPAN) 500 MG TBCR Take 1 Tab by mouth 3 times a day. 90 Each 6   • [facility] ASPIRIN 81 MG PO TBEC QAM.        No current facility-administered medications for this visit.      He  has a past medical history of Acute MI (HCC), Diabetes mellitus, Heme positive stool (6/8/2015), Hypertension, Overweight and obesity, and Pure hypercholesterolemia.    ROS   No chest pain, no shortness of breath, no abdominal pain       Objective:     /56 (BP Location: Left arm, Patient Position: Sitting, BP Cuff Size: Adult long)   Pulse 96   Temp 36.3 °C (97.3 °F) (Temporal)   Ht 1.829 m (6')   Wt (!) 138 kg (304 lb 3.2 oz)   SpO2 95%  Body mass index is 41.26 kg/m².   Physical Exam:  General: Alert, oriented and no acute distress.  Eye contact is good, speech goal directed, affect calm  HEENT: conjunctiva non-injected, sclera non-icteric.  Oral mucous membranes pink and moist with no lesions.  Pinna normal.   Lungs: Normal respiratory effort, clear to auscultation bilaterally with good excursion.  CV: regular rate and rhythm. No murmurs.   Abdomen: soft, non distended, nontender, Bowel sound normal.  Ext: no edema, color normal, vascularity normal, temperature normal  Musculoskeletal exam: moving all extremities freely    Diabetic Foot Exam:  Monofilament testing with a 10 gram force: sensation: decreased bilaterally  Visual Inspection: Feet without maceration, ulcers, or fissures.  Dry skin and callosity on both feet.  Pedal pulses: intact bilaterally      Assessment and Plan:   The following treatment plan was discussed:    1. Dyslipidemia  - Well-controlled. Continue  current regimen, atorvastatin 40 mg QN and niacin 5000 mg TID. I reviewed potential risks, benefits, and side effects of these medications with the patient in clinic today.  - Advised to eat low fat, low carbohydrate and high fiber diet as well as do cardio physical exercise regularly.   - Plan to continue to monitor with blood work, which will be ordered and reviewed by his new PCP.    2. Essential hypertension  - Well-controlled. Continue current regimen, metoprolol  mg QD and moexipril 7.5 mg QD. I reviewed potential risks, benefits, and side effects of these medications with the patient in clinic today.  - Discussed to eat low-sodium diet and encouraged to do regular physical exercise.  - Recommend to monitor blood pressure and heart rate at home.  - Plan to continue to monitor with blood work, which will be ordered and reviewed by his new PCP.    3. Type 2 diabetes mellitus with stage 3 chronic kidney disease, without long-term current use of insulin ([facility])  - A1c improved to 6.9 from 8.3. Patient advised to continue current regimen of pioglitazone 30 mg QD, victoza 1.8 mg injection QN, and canagliflozin-meftorm HCL ER  mg BID. I reviewed potential risks, benefits, and side effects of these medications with the patient in clinic today.  - Counseled to comply with medication and diet.   - Counseled signs and symptoms of hypoglycemia and management of hypoglycemia.   - Recommend to have retinal eye exam once a year.  - Advised to check both feet daily. Diabetic monofilament foot exam completed today, see above.  - Plan to continue to monitor with blood work, which will be ordered and reviewed by his new PCP.  - Diabetic Monofilament Lower Extremity Exam    4. CKD stage 3 due to type 2 diabetes mellitus (HCC)  - Chronic, stable.  Continue Moexipril 7.5 mg daily.  - Patient encouraged to lose weight, regulate diet, and increase water intake. Patient is advised to avoid NSAID's and other nephrotoxins.  -  Patient is advised to continue to control blood pressure and diabetes to prevent worsening kidney function.  - Plan to continue to monitor with blood work, which will be ordered and reviewed by his new PCP.    5. Positive FIT (fecal immunochemical test)  - Previous positive FIT. I have placed multiple previous referrals to GI for colonoscopy, however patient has not yet completed. I once again printed the referral for the patient. I informed the patient that it is imperative he follow up with GI for colonoscopy due to potential risk for colon cancer.   - He denies melena, hematochezia, abdominal pain. Denies familial history of colon cancer.    6. Health Maintenance   - Patient is due for AWV which he is encouraged to schedule with his new PCP.  - Colonoscopy due, see above (#5).  - Patient has elected to postpone Hep B vaccine after discussion of potential risks, benefits, and side effects.  - Patient was advised to inquire about the shingles vaccine at their local pharmacy due to clinic shortage. I reviewed the potential risks, benefits, and side effects with the patient.    Follow up: Return in about 3 months (around 3/4/2020), or if symptoms worsen or fail to improve, for Hypertension, Hyperlipidemia, Diabetes, CKD, Obesity.    I, Alejandra Martinez (Scribe), am scribing for, and in the presence of, Elle Cooper M.D.. Electronically signed by: Alejandra Martinez (Vira), 12/04/2019. I, Elle Cooper M.D., personally performed the services described in this documentation, as scribed by Alejandra Martinez in my presence, and it is both accurate and complete.    Please note that this dictation was created using voice recognition software. I have made every reasonable attempt to correct obvious errors, but I expect that there may have unintended errors in text, spelling, punctuation, or grammar that I did not discover.

## 2019-12-26 DIAGNOSIS — I10 ESSENTIAL HYPERTENSION: ICD-10-CM

## 2019-12-26 DIAGNOSIS — I25.2 HISTORY OF ACUTE ANTERIOR WALL MYOCARDIAL INFARCTION: ICD-10-CM

## 2019-12-30 RX ORDER — METOPROLOL SUCCINATE 100 MG/1
100 TABLET, EXTENDED RELEASE ORAL DAILY
Qty: 90 TAB | Refills: 0 | Status: SHIPPED | OUTPATIENT
Start: 2019-12-30 | End: 2020-06-23

## 2020-02-05 ENCOUNTER — OFFICE VISIT (OUTPATIENT)
Dept: ENDOCRINOLOGY | Facility: MEDICAL CENTER | Age: 65
End: 2020-02-05
Payer: MEDICARE

## 2020-02-05 VITALS
DIASTOLIC BLOOD PRESSURE: 60 MMHG | BODY MASS INDEX: 41.31 KG/M2 | OXYGEN SATURATION: 97 % | HEIGHT: 72 IN | SYSTOLIC BLOOD PRESSURE: 118 MMHG | WEIGHT: 305 LBS | HEART RATE: 78 BPM

## 2020-02-05 DIAGNOSIS — N18.30 TYPE 2 DIABETES MELLITUS WITH STAGE 3 CHRONIC KIDNEY DISEASE, WITHOUT LONG-TERM CURRENT USE OF INSULIN (HCC): ICD-10-CM

## 2020-02-05 DIAGNOSIS — I10 ESSENTIAL HYPERTENSION: ICD-10-CM

## 2020-02-05 DIAGNOSIS — E11.22 TYPE 2 DIABETES MELLITUS WITH STAGE 3 CHRONIC KIDNEY DISEASE, WITHOUT LONG-TERM CURRENT USE OF INSULIN (HCC): ICD-10-CM

## 2020-02-05 LAB
HBA1C MFR BLD: 6.9 % (ref 0–5.6)
INT CON NEG: NEGATIVE
INT CON POS: POSITIVE

## 2020-02-05 PROCEDURE — 83036 HEMOGLOBIN GLYCOSYLATED A1C: CPT | Performed by: PHYSICIAN ASSISTANT

## 2020-02-05 PROCEDURE — 99214 OFFICE O/P EST MOD 30 MIN: CPT | Performed by: PHYSICIAN ASSISTANT

## 2020-02-05 RX ORDER — PIOGLITAZONEHYDROCHLORIDE 30 MG/1
30 TABLET ORAL DAILY
Qty: 100 TAB | Refills: 3 | Status: SHIPPED | OUTPATIENT
Start: 2020-02-05 | End: 2020-08-05 | Stop reason: SDUPTHER

## 2020-02-05 RX ORDER — LIRAGLUTIDE 6 MG/ML
1.8 INJECTION SUBCUTANEOUS DAILY
Qty: 9 ML | Refills: 11 | Status: SHIPPED | OUTPATIENT
Start: 2020-02-05 | End: 2020-08-05 | Stop reason: SDUPTHER

## 2020-02-05 NOTE — PROGRESS NOTES
Return to office Patient Consult Note  Referred by: Elle Cooper M.D.    Reason for consult: Diabetes Management Type 2    HPI:  Perez Leone is a 64 y.o. old patient who is seeing us today for diabetes care.  This is a pleasant patient with diabetes and I appreciate the opportunity to participate in the care of this patient.    Labs of 2/5/2020 HbA1c is 6.9   Labs of 9/24/2019 HbA1c is 6.9   HbA1c on 9/24/18 7.2  HbA1c on 3/26/18 was 7.7  HbA1c 0n 6/26/17  is 6.5  Labs of 1/9/2017 he has a Cr. Of 1.1, HbA1c of 8.1, GFR 66, LDL 75, HDL 34,     BG Diary:2/5/2020  In the AM:  No log    Weight: He was on 2/13/17 306 pounds and today he is 272      1. Type 2 diabetes mellitus with stage 3 chronic kidney disease, without long-term current use of insulin ([facility])    Now on:  1.   Victoza 1.8 injection at night    2.   Synjardy 12.5/1000 one in AM one in PM  3.   Actos 15mg qday start on 3/1/17     2. Essential hypertension  This is stable today and no new changes are needed or required in today's visit        ROS:   Constitutional: No night sweats.  Eyes:  No visual changes.  Cardiac: No chest pain, No palpitations or racing heart rate.  Resp: No shortness of breath, No cough,   Gi: No Diarrhea      All other systems were reviewed and were/are negative.      Past Medical History:  Patient Active Problem List    Diagnosis Date Noted   • Presence of other cardiac implants and grafts 08/06/2018   • CKD stage 3 due to type 2 diabetes mellitus (HCC) 05/08/2017   • Positive FIT (fecal immunochemical test) 05/08/2017   • Type 2 diabetes mellitus with stage 3 chronic kidney disease, without long-term current use of insulin ([facility]) 02/13/2017   • Dyslipidemia 07/11/2016   • Essential hypertension 07/11/2016   • Heme positive stool 06/08/2015   • History of acute anterior wall myocardial infarction 03/09/2012   • CAD BMS LAD 03/09/2012   • Morbid obesity with BMI of 40.0-44.9, adult ([facility]) 03/09/2012       Past Surgical  History:  History reviewed. No pertinent surgical history.    Allergies:  Patient has no known allergies.    Social History:  Social History     Socioeconomic History   • Marital status: Single     Spouse name: Not on file   • Number of children: Not on file   • Years of education: Not on file   • Highest education level: Not on file   Occupational History   • Not on file   Social Needs   • Financial resource strain: Not on file   • Food insecurity:     Worry: Not on file     Inability: Not on file   • Transportation needs:     Medical: Not on file     Non-medical: Not on file   Tobacco Use   • Smoking status: Never Smoker   • Smokeless tobacco: Never Used   Substance and Sexual Activity   • Alcohol use: No     Alcohol/week: 0.0 oz   • Drug use: No   • Sexual activity: Never     Partners: Female   Lifestyle   • Physical activity:     Days per week: Not on file     Minutes per session: Not on file   • Stress: Not on file   Relationships   • Social connections:     Talks on phone: Not on file     Gets together: Not on file     Attends Islam service: Not on file     Active member of club or organization: Not on file     Attends meetings of clubs or organizations: Not on file     Relationship status: Not on file   • Intimate partner violence:     Fear of current or ex partner: Not on file     Emotionally abused: Not on file     Physically abused: Not on file     Forced sexual activity: Not on file   Other Topics Concern   • Not on file   Social History Narrative    Single    Works at the [employer] as a        Family History:  Family History   Problem Relation Age of Onset   • Heart Disease Mother         CHF   • Diabetes Brother    • Other Father         son does not father's med hx       Medications:    Current Outpatient Medications:   •  VICTOZA 18 MG/3ML Solution Pen-injector, Inject 1.8 mg as instructed every day., Disp: 9 mL, Rfl: 11  •  Empagliflozin-metFORMIN HCl ER 12.5-1000 MG  TABLET SR 24 HR, Take 1 Tab by mouth 2 times a day., Disp: 60 Tab, Rfl: 11  •  pioglitazone (ACTOS) 30 MG Tab, Take 1 Tab by mouth every day., Disp: 100 Tab, Rfl: 3  •  metoprolol SR (TOPROL XL) 100 MG TABLET SR 24 HR, Take 1 Tab by mouth every day., Disp: 90 Tab, Rfl: 0  •  nitroglycerin (NITROSTAT) 0.4 MG SL Tab, DISSOLVE ONE TABLET UNDER THE TONGUE EVERY 5 MINUTES AS NEEDED FOR CHEST PAIN.  DO NOT EXCEED A TOTAL OF 3 DOSES IN 15 MINUTES, Disp: 30 Tab, Rfl: 8  •  FREESTYLE LITE strip, USE 1 STRIP TO CHECK GLUCOSE TWICE DAILY AS NEEDED FOR SIGNS AND SYMPTOMS OF HIGH OR LOW SUGAR *E11.22*, Disp: 100 Strip, Rfl: 3  •  RELION PEN NEEDLES, USE NEEDLE THREE TIMES DAILY WITH INSULIN WITH MEALS, Disp: 100 Each, Rfl: 11  •  FREESTYLE LANCETS Misc, USE 1  TO CHECK GLUCOSE ONCE DAILY FOR  LOW  SUGAR  SYMPTOMS  *E11.22*, Disp: 100 Each, Rfl: 3  •  Blood Glucose Monitoring Suppl Supplies Misc, Test strips order: Test strips for Freestyle Lite meter. Sig: use twice a day and prn ssx high or low sugar, Disp: 100 Each, Rfl: 3  •  moexipril (UNIVASC) 7.5 MG Tab, Take 1 Tab by mouth every day., Disp: 90 Tab, Rfl: 3  •  atorvastatin (LIPITOR) 40 MG TABS, Take 1 Tab by mouth every evening., Disp: 90 Each, Rfl: 1  •  niacin SR (NIASPAN) 500 MG TBCR, Take 1 Tab by mouth 3 times a day., Disp: 90 Each, Rfl: 6  •  [facility] ASPIRIN 81 MG PO TBEC, QAM. , Disp: , Rfl:         Physical Examination:   Vital signs: /60 (BP Location: Left arm, Patient Position: Sitting)   Pulse 78   Ht 1.829 m (6')   Wt (!) 138.3 kg (305 lb)   SpO2 97%   BMI 41.37 kg/m²   General: No distress, cooperative, well dressed and well nourished.   Eyes: No scleral icterus or discharge, No hyposphagma  ENMT: Normal on external inspection of nose, lips, No nasal drainage   Neck: No abnormal masses on inspection  Resp: Normal effort, Bilateral clear to auscultation, No wheezing, No rales  CVS: Regular rate and rhythm, S1 S2 normal, No murmur. No  gallop  Extremities: No edema bilateral extremities  Neuro: Alert and oriented  Skin: No rash, No Ulcers  Psych: Normal mood and affect      Assessment and Plan:    1. Type 2 diabetes mellitus with stage 3 chronic kidney disease, without long-term current use of insulin (HCC)  Now on:  1.   Victoza 1.8 injection at night    2.   Synjardy 12.5/1000 one in AM one in PM  3.   Actos 15mg qday start on 3/1/17     2. Essential hypertension  This is stable today and no new changes are needed or required in today's visit    Return in about 6 months (around 8/5/2020).      Thank you kindly for allowing me to participate in the diabetes care plan for this patient.    Vish Mota PA-C, BC-ADM  Board Certified - Advanced Diabetes Management  02/05/20    CC:   Elle Cooper M.D.

## 2020-02-29 DIAGNOSIS — E11.22 TYPE 2 DIABETES MELLITUS WITH STAGE 3 CHRONIC KIDNEY DISEASE, WITHOUT LONG-TERM CURRENT USE OF INSULIN (HCC): ICD-10-CM

## 2020-02-29 DIAGNOSIS — N18.30 TYPE 2 DIABETES MELLITUS WITH STAGE 3 CHRONIC KIDNEY DISEASE, WITHOUT LONG-TERM CURRENT USE OF INSULIN (HCC): ICD-10-CM

## 2020-02-29 RX ORDER — LANCETS 28 GAUGE
EACH MISCELLANEOUS
Qty: 100 EACH | Refills: 0 | Status: SHIPPED
Start: 2020-02-29 | End: 2020-03-10

## 2020-03-10 ENCOUNTER — OFFICE VISIT (OUTPATIENT)
Dept: MEDICAL GROUP | Age: 65
End: 2020-03-10
Payer: MEDICARE

## 2020-03-10 VITALS
HEART RATE: 74 BPM | BODY MASS INDEX: 42.04 KG/M2 | DIASTOLIC BLOOD PRESSURE: 68 MMHG | WEIGHT: 310.4 LBS | TEMPERATURE: 97.7 F | SYSTOLIC BLOOD PRESSURE: 120 MMHG | OXYGEN SATURATION: 96 % | HEIGHT: 72 IN

## 2020-03-10 DIAGNOSIS — E78.5 DYSLIPIDEMIA: ICD-10-CM

## 2020-03-10 DIAGNOSIS — Z76.89 ENCOUNTER TO ESTABLISH CARE WITH NEW DOCTOR: Primary | ICD-10-CM

## 2020-03-10 DIAGNOSIS — I25.10 CORONARY ARTERY DISEASE INVOLVING NATIVE CORONARY ARTERY OF NATIVE HEART WITHOUT ANGINA PECTORIS: ICD-10-CM

## 2020-03-10 DIAGNOSIS — N18.30 CKD STAGE 3 DUE TO TYPE 2 DIABETES MELLITUS (HCC): ICD-10-CM

## 2020-03-10 DIAGNOSIS — I25.2 HISTORY OF ACUTE ANTERIOR WALL MYOCARDIAL INFARCTION: ICD-10-CM

## 2020-03-10 DIAGNOSIS — E11.22 CKD STAGE 3 DUE TO TYPE 2 DIABETES MELLITUS (HCC): ICD-10-CM

## 2020-03-10 DIAGNOSIS — I10 ESSENTIAL HYPERTENSION: ICD-10-CM

## 2020-03-10 DIAGNOSIS — Z95.810 PRESENCE OF SINGLE CHAMBER IMPLANTABLE CARDIOVERTER-DEFIBRILLATOR (ICD): ICD-10-CM

## 2020-03-10 DIAGNOSIS — N18.30 TYPE 2 DIABETES MELLITUS WITH STAGE 3 CHRONIC KIDNEY DISEASE, WITHOUT LONG-TERM CURRENT USE OF INSULIN (HCC): ICD-10-CM

## 2020-03-10 DIAGNOSIS — R19.5 POSITIVE FIT (FECAL IMMUNOCHEMICAL TEST): ICD-10-CM

## 2020-03-10 DIAGNOSIS — E66.01 MORBID OBESITY WITH BMI OF 40.0-44.9, ADULT (HCC): ICD-10-CM

## 2020-03-10 DIAGNOSIS — E11.22 TYPE 2 DIABETES MELLITUS WITH STAGE 3 CHRONIC KIDNEY DISEASE, WITHOUT LONG-TERM CURRENT USE OF INSULIN (HCC): ICD-10-CM

## 2020-03-10 PROCEDURE — 99215 OFFICE O/P EST HI 40 MIN: CPT | Performed by: FAMILY MEDICINE

## 2020-03-10 RX ORDER — LANCETS 30 GAUGE
EACH MISCELLANEOUS
Qty: 100 EACH | Refills: 5 | Status: SHIPPED | OUTPATIENT
Start: 2020-03-10 | End: 2021-04-13

## 2020-03-10 RX ORDER — GLUCOSAMINE HCL/CHONDROITIN SU 500-400 MG
CAPSULE ORAL
Qty: 100 STRIP | Refills: 5 | Status: SHIPPED | OUTPATIENT
Start: 2020-03-10 | End: 2021-04-13

## 2020-03-10 SDOH — SOCIAL STABILITY: SOCIAL INSECURITY
WITHIN THE LAST YEAR, HAVE YOU BEEN KICKED, HIT, SLAPPED, OR OTHERWISE PHYSICALLY HURT BY YOUR PARTNER OR EX-PARTNER?: PATIENT DECLINED

## 2020-03-10 SDOH — SOCIAL STABILITY: SOCIAL INSECURITY
WITHIN THE LAST YEAR, HAVE YOU BEEN HUMILIATED OR EMOTIONALLY ABUSED IN OTHER WAYS BY YOUR PARTNER OR EX-PARTNER?: PATIENT DECLINED

## 2020-03-10 SDOH — SOCIAL STABILITY: SOCIAL INSECURITY: WITHIN THE LAST YEAR, HAVE YOU BEEN AFRAID OF YOUR PARTNER OR EX-PARTNER?: PATIENT DECLINED

## 2020-03-10 SDOH — SOCIAL STABILITY: SOCIAL INSECURITY
WITHIN THE LAST YEAR, HAVE TO BEEN RAPED OR FORCED TO HAVE ANY KIND OF SEXUAL ACTIVITY BY YOUR PARTNER OR EX-PARTNER?: PATIENT DECLINED

## 2020-03-10 ASSESSMENT — PATIENT HEALTH QUESTIONNAIRE - PHQ9: CLINICAL INTERPRETATION OF PHQ2 SCORE: 0

## 2020-03-10 ASSESSMENT — FIBROSIS 4 INDEX: FIB4 SCORE: 1.03

## 2020-03-10 NOTE — PROGRESS NOTES
CC: establish care     HPI:     Perez Leone is a 64 y.o. male, new patient to the clinic and would like to establish care. This is a former patient of Dr. Cooper.     Patient has a chronic history of type 2 diabetes. He is currently taking Actos, Victoza, and Empagliflozin-Metofrmin. Denies any medication side effects. His last A1C was completed on 2/5/20, which was at a 6.9. Patient denies any polydipsia, polyuria, polyphagia, weight loss, numbness, tingling, changes in vision, ulcerations, or poor healing wounds. He follows up with an Endocrinologist.      Patient has been due for a colonoscopy since 2005. Patient was found to have blood in his stool back in May 2019. He states that he has been busy lately and has been unable to schedule an appointment, but states he will try to get one completed soon.     Patient has a chronic history of dyslipidemia, hypertension, and chronic kidney disease. He is compliant with all of his medications, and denies any medication side effects.     Pt has hx of CAD, s/p stent of LAD in 2003. He also has hx of reduced EF w/o s/s of CHF. ICD was implanted in 7/2018. He continues to do well and continues to f/u with Dr. Sung every 6 months.     Current medicines (including changes today)  Current Outpatient Medications   Medication Sig Dispense Refill   • Glucose Blood (BLOOD GLUCOSE TEST STRIPS) Strip Test your blood sugar twice daily and PRN for symptoms of high or low blood sugar 100 Strip 5   • Lancets Check blood sugar 2 times daily and prn symptoms high or low sugar. 100 Each 5   • VICTOZA 18 MG/3ML Solution Pen-injector Inject 1.8 mg as instructed every day. 9 mL 11   • Empagliflozin-metFORMIN HCl ER 12.5-1000 MG TABLET SR 24 HR Take 1 Tab by mouth 2 times a day. 60 Tab 11   • pioglitazone (ACTOS) 30 MG Tab Take 1 Tab by mouth every day. 100 Tab 3   • metoprolol SR (TOPROL XL) 100 MG TABLET SR 24 HR Take 1 Tab by mouth every day. 90 Tab 0   • nitroglycerin (NITROSTAT)  0.4 MG SL Tab DISSOLVE ONE TABLET UNDER THE TONGUE EVERY 5 MINUTES AS NEEDED FOR CHEST PAIN.  DO NOT EXCEED A TOTAL OF 3 DOSES IN 15 MINUTES 30 Tab 8   • RELION PEN NEEDLES USE NEEDLE THREE TIMES DAILY WITH INSULIN WITH MEALS 100 Each 11   • Blood Glucose Monitoring Suppl Supplies Misc Test strips order: Test strips for Freestyle Lite meter. Sig: use twice a day and prn ssx high or low sugar 100 Each 3   • moexipril (UNIVASC) 7.5 MG Tab Take 1 Tab by mouth every day. 90 Tab 3   • atorvastatin (LIPITOR) 40 MG TABS Take 1 Tab by mouth every evening. 90 Each 1   • niacin SR (NIASPAN) 500 MG TBCR Take 1 Tab by mouth 3 times a day. 90 Each 6   • [facility] ASPIRIN 81 MG PO [facility] QAM.        No current facility-administered medications for this visit.      He  has a past medical history of Acute MI (HCC), Diabetes mellitus, Heme positive stool (2015), Hypertension, Overweight and obesity, and Pure hypercholesterolemia.  He  has no past surgical history on file.  Social History     Tobacco Use   • Smoking status: Never Smoker   • Smokeless tobacco: Never Used   Substance Use Topics   • Alcohol use: No     Alcohol/week: 0.0 oz   • Drug use: No     Social History     Social History Narrative    Single    Works at the [employer] as a      Family History   Problem Relation Age of Onset   • Heart Disease Mother         CHF   • Diabetes Brother    • Other Father         son does not father's med hx     Family Status   Relation Name Status   • Mo   at age 77        pneumonia   • Bro  Alive   • Fa 89 Alive       I personally reviewed patient's problem list, allergies, medications, family hx, social hx with patient and update [facility].     REVIEW OF SYSTEMS:  CONSTITUTIONAL:  Denies night sweats, fatigue, malaise, lethargy, fever or chills.  RESPIRATORY:  Denies cough, wheeze, hemoptysis, or shortness of breath.  CARDIOVASCULAR:  Denies chest pains, palpitations, pedal edema  GASTROINTESTINAL:   Denies abdominal pain, nausea or vomiting, diarrhea, constipation, hematemesis, hematochezia, melena.  GENITOURINARY:  Denies urinary urgency, frequency, dysuria, or hematuria.  No obstructive symptoms.  Denies unusual discharge.    All other systems reviewed and are negative     Objective:     /68 (BP Location: Left arm, Patient Position: Sitting, BP Cuff Size: Large adult long)   Pulse 74   Temp 36.5 °C (97.7 °F) (Temporal)   Ht 1.829 m (6')   Wt (!) 140.8 kg (310 lb 6.4 oz)   SpO2 96%  Body mass index is 42.1 kg/m².  Physical Exam:    Constitutional: Awake, alert, in no apparent distress. Morbidly obese.   Skin: Warm, dry, good turgor, no rashes/jaundice in visible areas.  Eye: PERRL, intact EOM, conjunctiva clear, lids normal.  ENMT: TM and auditory canal wnl, nasal & oral mucosa wnl, lips without lesions, good dentition, oropharynx clear.  Neck: Trachea midline, no masses, no thyromegaly. No cervical or supraclavicular lymphadenopathy.  Respiratory: Unlabored respiratory effort, lungs clear to auscultation, no wheezes, no rales.  Cardiovascular: Normal S1, S2, no murmur, no rubs, no gallops, no pedal edema.  Psych: Alert and oriented x3, affect and mood wnl, intact judgement and insight.       Assessment and Plan:   The following treatment plan was discussed    1. Dyslipidemia  Chronic, controlled with Lipitor 40 mg qd, no s/e reported, will continue.    - Lipid Profile; Future  - recommended dietary modification, exercise, and weight loss.      2. Essential hypertension  Chronic, controlled with Metoprolol  mg qd, Moexipril 7.5 mg qd, , no s/e reported, will continue.    - recommended dietary modification, exercise, and weight loss.      3. CKD stage 3 due to type 2 diabetes mellitus (HCC)  Chronic, stable, will monitor.   Discussed hydration and avoidance of nephrotoxic drugs.    - Comp Metabolic Panel; Future    4. Type 2 diabetes mellitus with stage 3 chronic kidney disease, without  long-term current use of insulin ([facility])  Chronic, controlled, last A1C was 6.9, managed by Endo. He is taking Empagliflozin-Metformin 12.5-1000 mg qd, Actos 30 mg qd, and Victoza 1.8 mg qd.   - Glucose Blood (BLOOD GLUCOSE TEST STRIPS) Strip; Test your blood sugar twice daily and PRN for symptoms of high or low blood sugar  Dispense: 100 Strip; Refill: 5  - Lancets; Check blood sugar 2 times daily and prn symptoms high or low sugar.  Dispense: 100 Each; Refill: 5  - HEMOGLOBIN A1C; Future  - Comp Metabolic Panel; Future  - CBC WITH DIFFERENTIAL; Future  - MICROALBUMIN CREAT RATIO URINE; Future  - f/u with endo     5. Morbid obesity with BMI of 40.0-44.9, adult ([facility])  - Patient identified as having weight management issue.  Appropriate orders and counseling given.     6. History of acute anterior wall myocardial infarction  7. Presence of single chamber implantable cardioverter-defibrillator (ICD)  8. Coronary artery disease involving native coronary artery of native heart without angina pectoris  Pt has hx of CAD, s/p stent of LAD in 2003. He also has hx of reduced EF w/o s/s of CHF. ICD was implanted in 7/2018. He continues to do well and continues to f/u with Dr. Sung every 6 months  - f/u with Dr. Sung  - recommended dietary modification, exercise, and weight loss.      9. Positive FIT (fecal immunochemical test)  He had positive FIT in 2019. He was referred to GI but too busy to schedule appointment. He remains asymptomatic. Denies familial hx of colon cancer.   - pt was encouraged to schedule appointment with GI.      10. Encounter to establish care with new doctor  General health and wellness counseling provided. Topics might include: diet, exercise, vitamin supplement, mental health, sleep, stress, preventive cares and vaccine recommendations.         Chely Vanessa M.D.    Records requested.  Followup: Return in about 6 months (around 9/10/2020) for Multiple issues.    Please note that this dictation was  created using voice recognition software and/or scribes. I have made every reasonable attempt to correct obvious errors, but I expect that there are errors of grammar and possibly content that I did not discover before finalizing the note.     IRoe (Scribe), am scribing for, and in the presence of, Chely Vanessa M.D.    Electronically signed by: Roe Angel (Scribe), 3/10/2020    Chely CARO M.D. personally performed the services described in this documentation, as scribed by Roe Angel in my presence, and it is both accurate and complete.

## 2020-03-11 PROBLEM — Z95.810 PRESENCE OF SINGLE CHAMBER IMPLANTABLE CARDIOVERTER-DEFIBRILLATOR (ICD): Status: ACTIVE | Noted: 2018-08-06

## 2020-05-12 ENCOUNTER — TELEPHONE (OUTPATIENT)
Dept: ENDOCRINOLOGY | Facility: MEDICAL CENTER | Age: 65
End: 2020-05-12

## 2020-05-12 NOTE — TELEPHONE ENCOUNTER
Patient called asking for us to do a prior auth on Invokamet. From last notes patient was on [facility]   LM for patient this information and told that if he was prescribed invokamet that he would have to go to that office to get a prior auth

## 2020-06-10 NOTE — PATIENT INSTRUCTIONS
1800 Calorie Diet for Diabetes Meal Planning  The 1800 calorie diet is designed for eating up to 1800 calories each day. Following this diet and making healthy meal choices can help improve overall health. This diet controls blood sugar (glucose) levels and can also help lower blood pressure and cholesterol.  SERVING SIZES  Measuring foods and serving sizes helps to make sure you are getting the right amount of food. The list below tells how big or small some common serving sizes are:  · 1 oz.........4 stacked dice.   · 3 oz.........Deck of cards.   · 1 tsp........Tip of little finger.   · 1 tbs........Thumb.   · 2 tbs........Golf ball.   · ½ cup.......Half of a fist.   · 1 cup........A fist.   GUIDELINES FOR CHOOSING FOODS  The goal of this diet is to eat a variety of foods and limit calories to 1800 each day. This can be done by choosing foods that are low in calories and fat. The diet also suggests eating small amounts of food frequently. Doing this helps control your blood glucose levels so they do not get too high or too low. Each meal or snack may include a protein food source to help you feel more satisfied and to stabilize your blood glucose. Try to eat about the same amount of food around the same time each day. This includes weekend days, travel days, and days off work. Space your meals about 4 to 5 hours apart and add a snack between them if you wish.   For example, a daily food plan could include breakfast, a morning snack, lunch, dinner, and an evening snack. Healthy meals and snacks include whole grains, vegetables, fruits, lean meats, poultry, fish, and dairy products. As you plan your meals, select a variety of foods. Choose from the bread and starch, vegetable, fruit, dairy, and meat/protein groups. Examples of foods from each group and their suggested serving sizes are listed below. Use measuring cups and spoons to become familiar with what a healthy portion looks like.  Bread and Starch  Each  serving equals 15 grams of carbohydrates.  · 1 slice bread.   · ¼ bagel.   · ¾ cup cold cereal (unsweetened).   · ½ cup hot cereal or mashed potatoes.   · 1 small potato (size of a computer mouse).   ·  cup cooked pasta or rice.   · ½ English muffin.   · 1 cup broth-based soup.   · 3 cups of popcorn.   · 4 to 6 whole-wheat crackers.   · ½ cup cooked beans, peas, or corn.   Vegetable  Each serving equals 5 grams of carbohydrates.  · ½ cup cooked vegetables.   · 1 cup raw vegetables.   · ½ cup tomato or vegetable juice.   Fruit  Each serving equals 15 grams of carbohydrates.  · 1 small apple or orange.   · 1¼ cup watermelon or strawberries.   · ½ cup applesauce (no sugar added).   · 2 tbs raisins.   · ½ banana.   · ½ cup canned fruit, packed in water, its own juice, or sweetened with a sugar substitute.   · ½ cup unsweetened fruit juice.   Dairy  Each serving equals 12 to 15 grams of carbohydrates.  · 1 cup fat-free milk.   · 6 oz artificially sweetened yogurt or plain yogurt.   · 1 cup low-fat buttermilk.   · 1 cup soy milk.   · 1 cup almond milk.   Meat/Protein  · 1 large egg.   · 2 to 3 oz meat, poultry, or fish.   · ¼ cup low-fat cottage cheese.   · 1 tbs peanut butter.   · 1 oz low-fat cheese.   · ¼ cup tuna in water.   · ½ cup tofu.   Fat  · 1 tsp oil.   · 1 tsp trans-fat-free margarine.   · 1 tsp butter.   · 1 tsp mayonnaise.   · 2 tbs avocado.   · 1 tbs salad dressing.   · 1 tbs cream cheese.   · 2 tbs sour cream.   SAMPLE 1800 CALORIE DIET PLAN  Breakfast  · ¾ cup unsweetened cereal (1 carb serving).   · 1 cup fat-free milk (1 carb serving).   · 1 slice whole-wheat toast (1 carb serving).   · ½ small banana (1 carb serving).   · 1 scrambled egg.   · 1 tsp trans-fat-free margarine.   Lunch  · Tuna sandwich.   · 2 slices whole-wheat bread (2 carb servings).   · ½ cup canned tuna in water, drained.   · 1 tbs reduced fat mayonnaise.   · 1 stalk celery, chopped.   · 2 slices tomato.   · 1 lettuce leaf.   · 1 cup  carrot sticks.   · 24 to 30 seedless grapes (2 carb servings).   · 6 oz light yogurt (1 carb serving).   Afternoon Snack  · 3 johnathan cracker squares (1 carb serving).   · Fat-free milk, 1 cup (1 carb serving).   · 1 tbs peanut butter.   Dinner  · 3 oz salmon, broiled with 1 tsp oil.   · 1 cup mashed potatoes (2 carb servings) with 1 tsp trans-fat-free margarine.   · 1 cup fresh or frozen green beans.   · 1 cup steamed asparagus.   · 1 cup fat-free milk (1 carb serving).   Evening Snack  · 3 cups air-popped popcorn (1 carb serving).   · 2 tbs parmesan cheese sprinkled on top.   MEAL PLAN  Use this worksheet to help you make a daily meal plan based on the 1800 calorie diet suggestions. If you are using this plan to help you control your blood glucose, you may interchange carbohydrate-containing foods (dairy, starches, and fruits). Select a variety of fresh foods of varying colors and flavors. The total amount of carbohydrate in your meals or snacks is more important than making sure you include all of the food groups every time you eat. Choose from the following foods to build your day's meals:  · 8 Starches.   · 4 Vegetables.   · 3 Fruits.   · 2 Dairy.   · 6 to 7 oz Meat/Protein.   · Up to 4 Fats.   Your dietician can use this worksheet to help you decide how many servings and which types of foods are right for you.  BREAKFAST  Food Group and Servings / Food Choice  Starch ________________________________________________________  Dairy _________________________________________________________  Fruit _________________________________________________________  Meat/Protein __________________________________________________  Fat ___________________________________________________________  LUNCH  Food Group and Servings / Food Choice  Starch ________________________________________________________  Meat/Protein __________________________________________________  Vegetable  _____________________________________________________  Fruit _________________________________________________________  Dairy _________________________________________________________  Fat ___________________________________________________________  AFTERNOON SNACK  Food Group and Servings / Food Choice  Starch ________________________________________________________  Meat/Protein __________________________________________________  Fruit __________________________________________________________  Dairy _________________________________________________________  DINNER  Food Group and Servings / Food Choice  Starch _________________________________________________________  Meat/Protein ___________________________________________________  Dairy __________________________________________________________  Vegetable ______________________________________________________  Fruit ___________________________________________________________  Fat ____________________________________________________________  EVENING SNACK  Food Group and Servings / Food Choice  Fruit __________________________________________________________  Meat/Protein ___________________________________________________  Dairy __________________________________________________________  Starch _________________________________________________________  DAILY TOTALS  Starch ____________________________  Vegetable _________________________  Fruit _____________________________  Dairy _____________________________  Meat/Protein______________________  Fat _______________________________  Document Released: 07/10/2006 Document Revised: 03/11/2013 Document Reviewed: 11/02/2012  [employer]® Patient Information ©2013 [facility] [employer].   Private car

## 2020-06-16 ENCOUNTER — TELEPHONE (OUTPATIENT)
Dept: ENDOCRINOLOGY | Facility: MEDICAL CENTER | Age: 65
End: 2020-06-16

## 2020-06-16 NOTE — TELEPHONE ENCOUNTER
DOCUMENTATION OF PAR STATUS:    1. Name of Medication & Dose: Invokamet XR 50-500mg     2. Name of Prescription Coverage Company & phone #: cover my meds    3. Date Prior Auth Submitted: 06/16/20    4. What information was given to obtain insurance decision? Last office notes     5. Prior Auth Status? Approved     6. Patient Notified: yes

## 2020-06-22 DIAGNOSIS — I25.2 HISTORY OF ACUTE ANTERIOR WALL MYOCARDIAL INFARCTION: ICD-10-CM

## 2020-06-22 DIAGNOSIS — I10 ESSENTIAL HYPERTENSION: ICD-10-CM

## 2020-06-23 RX ORDER — METOPROLOL SUCCINATE 100 MG/1
TABLET, EXTENDED RELEASE ORAL
Qty: 90 TAB | Refills: 0 | Status: SHIPPED | OUTPATIENT
Start: 2020-06-23 | End: 2020-12-10

## 2020-08-05 ENCOUNTER — OFFICE VISIT (OUTPATIENT)
Dept: ENDOCRINOLOGY | Facility: MEDICAL CENTER | Age: 65
End: 2020-08-05
Attending: INTERNAL MEDICINE
Payer: MEDICARE

## 2020-08-05 VITALS
DIASTOLIC BLOOD PRESSURE: 92 MMHG | BODY MASS INDEX: 42.66 KG/M2 | SYSTOLIC BLOOD PRESSURE: 136 MMHG | WEIGHT: 315 LBS | OXYGEN SATURATION: 96 % | HEART RATE: 82 BPM | HEIGHT: 72 IN

## 2020-08-05 DIAGNOSIS — E78.5 DYSLIPIDEMIA: ICD-10-CM

## 2020-08-05 DIAGNOSIS — E11.22 CONTROLLED TYPE 2 DIABETES MELLITUS WITH STAGE 3 CHRONIC KIDNEY DISEASE, WITHOUT LONG-TERM CURRENT USE OF INSULIN (HCC): ICD-10-CM

## 2020-08-05 DIAGNOSIS — Z79.84 LONG TERM (CURRENT) USE OF ORAL HYPOGLYCEMIC DRUGS: ICD-10-CM

## 2020-08-05 DIAGNOSIS — N18.30 CONTROLLED TYPE 2 DIABETES MELLITUS WITH STAGE 3 CHRONIC KIDNEY DISEASE, WITHOUT LONG-TERM CURRENT USE OF INSULIN (HCC): ICD-10-CM

## 2020-08-05 DIAGNOSIS — I10 ESSENTIAL HYPERTENSION: ICD-10-CM

## 2020-08-05 LAB
HBA1C MFR BLD: 6.8 % (ref 0–5.6)
INT CON NEG: NEGATIVE
INT CON POS: POSITIVE

## 2020-08-05 PROCEDURE — 99213 OFFICE O/P EST LOW 20 MIN: CPT | Performed by: INTERNAL MEDICINE

## 2020-08-05 PROCEDURE — 83036 HEMOGLOBIN GLYCOSYLATED A1C: CPT | Performed by: INTERNAL MEDICINE

## 2020-08-05 PROCEDURE — 99214 OFFICE O/P EST MOD 30 MIN: CPT | Performed by: INTERNAL MEDICINE

## 2020-08-05 RX ORDER — PIOGLITAZONEHYDROCHLORIDE 30 MG/1
30 TABLET ORAL DAILY
Qty: 100 TAB | Refills: 3 | Status: SHIPPED | OUTPATIENT
Start: 2020-08-05 | End: 2020-11-13

## 2020-08-05 RX ORDER — LIRAGLUTIDE 6 MG/ML
1.8 INJECTION SUBCUTANEOUS DAILY
Qty: 3 EACH | Refills: 11 | Status: SHIPPED | OUTPATIENT
Start: 2020-08-05 | End: 2021-08-23

## 2020-08-05 ASSESSMENT — FIBROSIS 4 INDEX: FIB4 SCORE: 1.05

## 2020-08-05 NOTE — PROGRESS NOTES
CHIEF COMPLAINT: Patient is here for follow up of Type 2 Diabetes Mellitus    HPI:     Perez Leone is a 65 y.o. male with Type 2 Diabetes Mellitus here for follow up.    Labs from 8/5/2020 HbA1c is well controlled at 6.8%    Patient is a morbidly obese gentleman with a history of coronary artery disease status post PCI by Dr. Sung, he has hypertension, chronic kidney disease and hyperlipidemia.      On follow-up he is taking Synjardy 12.5/1000 mg twice a day, Actos 50 mg daily and Victoza 1.8 mg daily.    He is tolerating his medications very well and denies adverse side effects.  He denies severe hyperglycemia  He denies hypoglycemia      He is taking atorvastatin 80 mg daily  He is still taking Niaspan for unclear reasons.  I discussed with him that this drug has no proven cardiovascular benefits  He reports good compliance and denies muscle aches and cramps  We do not have a recent lipid panel on hand  Last LDL cholesterol was controlled at 70 back on November 2019      His blood pressure is fair while taking moexipril and metoprolol XL          BG Diary:08/05/20  Patient did not bring glucose meter but he did bring his glucose logs, fasting sugars , postprandial blood sugars are 160-180 which is fair    Weight has been stable    Diabetes Complications   Retinopathy: No known retinopathy.  Last eye exam: October 2019 with Dr. Johnson  Neuropathy: Denies paresthesias or numbness in hands or feet. Denies any foot wounds.  Exercise: Minimal.  Diet: Fair.  Patient's medications, allergies, and social histories were reviewed and updated as appropriate.    ROS:     CONS:     No fever, no chills   EYES:     No diplopia, no blurry vision   CV:           No chest pain, no palpitations   PULM:     No SOB, no cough, no hemoptysis.   GI:            No nausea, no vomiting, no diarrhea, no constipation   ENDO:     No polyuria, no polydipsia, no heat intolerance, no cold intolerance       Past Medical  History:  Problem List:  2018-08: Presence of single chamber implantable cardioverter-  defibrillator (ICD)  2017-05: CKD stage 3 due to type 2 diabetes mellitus ([facility])  2017-05: Positive FIT (fecal immunochemical test)  2017-02: Encounter for long-term (current) use of insulin ([facility])  2017-02: Type 2 diabetes mellitus with stage 3 chronic kidney disease,  without long-term current use of insulin ([facility])  2016-07: Dyslipidemia  2016-07: Essential hypertension  2015-12: Uncontrolled type 2 diabetes mellitus with stage 3 chronic   kidney disease, without long-term current use of insulin ([facility])  2015-06: Heme positive stool  2014-01: DM (diabetes mellitus) ([facility])  2012-03: History of acute anterior wall myocardial infarction  2012-03: CAD s/p stent LAD in 2003_Dr. Sung  2012-03: DIABETES MELLITUS  2012-03: Morbid obesity with BMI of 40.0-44.9, adult ([facility])      Past Surgical History:  History reviewed. No pertinent surgical history.     Allergies:  Patient has no known allergies.     Social History:  Social History     Tobacco Use   • Smoking status: Never Smoker   • Smokeless tobacco: Never Used   Substance Use Topics   • Alcohol use: No     Alcohol/week: 0.0 oz   • Drug use: No        Family History:   family history includes Diabetes in his brother; Heart Disease in his mother; Other in his father.      PHYSICAL EXAM:   OBJECTIVE:  Vital signs: /92 (BP Location: Left arm, Patient Position: Sitting, BP Cuff Size: Adult)   Pulse 82   Ht 1.829 m (6')   Wt (!) 145.6 kg (321 lb)   SpO2 96%   BMI 43.54 kg/m²   GENERAL: Severely obese gentleman in no apparent distress.   EYE:  No ocular asymmetry, PERRLA  HENT: Pink, moist mucous membranes.    NECK: No thyromegaly.   CARDIOVASCULAR:  No murmurs  LUNGS: Clear breath sounds  ABDOMEN: Soft, nontender   EXTREMITIES: No clubbing, cyanosis, or edema.   NEUROLOGICAL: No gross focal motor abnormalities   LYMPH: No cervical adenopathy palpated.   SKIN: No rashes, lesions.    Monofilament testing with a 10 gram force: sensation: intact bilaterally  Visual Inspection: Feet without maceration, ulcers, or fissures.  Pedal pulses: intact bilaterally    Labs:  Lab Results   Component Value Date/Time    HBA1C 6.8 (A) 08/05/2020 08:53 AM        Lab Results   Component Value Date/Time    WBC 7.7 11/11/2019 04:52 AM    WBC 8.4 01/13/2014 07:38 AM    WBC 8.7 10/19/2011 06:44 AM    RBC 4.18 11/11/2019 04:52 AM    RBC 4.46 (L) 01/13/2014 07:38 AM    RBC 4.36 10/19/2011 06:44 AM    HEMOGLOBIN 13.6 11/11/2019 04:52 AM    HEMOGLOBIN 13.9 (L) 01/13/2014 07:38 AM    MCV 97 11/11/2019 04:52 AM    MCV 95.2 01/13/2014 07:38 AM    MCV 95 10/19/2011 06:44 AM    MCH 32.5 11/11/2019 04:52 AM    MCH 31.2 01/13/2014 07:38 AM    MCH 32.3 10/19/2011 06:44 AM    MCHC 33.6 11/11/2019 04:52 AM    MCHC 32.8 01/13/2014 07:38 AM    RDW 14.4 11/11/2019 04:52 AM    RDW 13.7 01/13/2014 07:38 AM    RDW 13.9 10/19/2011 06:44 AM    MPV 10.5 (H) 01/13/2014 07:38 AM       Lab Results   Component Value Date/Time    SODIUM 140 11/11/2019 04:52 AM    SODIUM 135 01/13/2014 07:38 AM    POTASSIUM 4.8 11/11/2019 04:52 AM    POTASSIUM 4.3 01/13/2014 07:38 AM    CHLORIDE 107 (H) 11/11/2019 04:52 AM    CHLORIDE 103 01/13/2014 07:38 AM    CO2 17 (L) 11/11/2019 04:52 AM    CO2 24 01/13/2014 07:38 AM    ANION 8.0 01/13/2014 07:38 AM    GLUCOSE 137 (H) 11/11/2019 04:52 AM    GLUCOSE 305 (H) 01/13/2014 07:38 AM    BUN 19 11/11/2019 04:52 AM    BUN 17 01/13/2014 07:38 AM    CREATININE 1.53 (H) 11/11/2019 04:52 AM    CREATININE 1.12 01/13/2014 07:38 AM    CREATININE 0.98 10/19/2011 06:44 AM    CALCIUM 9.5 11/11/2019 04:52 AM    CALCIUM 9.5 01/13/2014 07:38 AM    ASTSGOT 17 11/11/2019 04:52 AM    ASTSGOT 13 01/13/2014 07:38 AM    ALTSGPT 21 11/11/2019 04:52 AM    ALTSGPT 17 01/13/2014 07:38 AM    TBILIRUBIN 0.6 11/11/2019 04:52 AM    TBILIRUBIN 0.9 01/13/2014 07:38 AM    ALBUMIN 3.8 11/11/2019 04:52 AM    ALBUMIN 4.1 01/13/2014 07:38 AM     TOTPROTEIN 6.5 11/11/2019 04:52 AM    TOTPROTEIN 6.8 01/13/2014 07:38 AM    GLOBULIN 2.7 11/11/2019 04:52 AM    GLOBULIN 2.7 01/13/2014 07:38 AM    AGRATIO 1.4 11/11/2019 04:52 AM    AGRATIO 1.5 01/13/2014 07:38 AM       Lab Results   Component Value Date/Time    CHOLSTRLTOT 129 11/11/2019 0452    TRIGLYCERIDE 97 11/11/2019 0452    HDL 40 11/11/2019 0452    LDL 70 11/11/2019 0452    CHOLHDLRAT 4.0 10/19/2011 0644       Lab Results   Component Value Date/Time    MICROALBCALC <3.2 06/26/2017 07:36 AM    MALBCRT 5 01/13/2014 07:38 AM    MICROALBUR 0.7 01/13/2014 07:38 AM    MICRALB 17.3 11/11/2019 04:52 AM        No results found for: TSHULTRASEN  No results found for: FREEDIR  No results found for: FREET3  No results found for: THYSTIMIG        ASSESSMENT/PLAN:     1. Controlled type 2 diabetes mellitus with stage 3 chronic kidney disease, without long-term current use of insulin (HCC)  Well-controlled  Continue Synjardy, Victoza and low-dose Actos  I still recommend that he watch his carb intake and exercise regularly  We have a copy of his eye exam  Foot exam was completed today  We will plan for follow-up in 3 months with a repeat of his A1c and other labs    2. Dyslipidemia  Well-controlled  Last LDL cholesterol was at goal at 70  Continue atorvastatin  Recommend that he talk to his cardiologist about stopping Niaspan because it has no proven cardiovascular benefit    3. Essential hypertension  Well-controlled  Continue current medications  I am checking a urine microalbumin with his next labs    4. Long term (current) use of oral hypoglycemic drugs  Patient is on multiple oral agents for type 2 diabetes management      No follow-ups on file.      This patient during there office visit today was started on a new medication.  Side effects of the new medication were discussed with the patient today in the office.     Thank you kindly for allowing me to participate in the diabetes care plan for this  patient.    Darius Macdonald MD, FACE, [facility]  08/05/20    CC:   Chely Vanessa M.D.

## 2020-08-31 DIAGNOSIS — N18.30 CONTROLLED TYPE 2 DIABETES MELLITUS WITH STAGE 3 CHRONIC KIDNEY DISEASE, WITHOUT LONG-TERM CURRENT USE OF INSULIN (HCC): ICD-10-CM

## 2020-08-31 DIAGNOSIS — E11.22 CONTROLLED TYPE 2 DIABETES MELLITUS WITH STAGE 3 CHRONIC KIDNEY DISEASE, WITHOUT LONG-TERM CURRENT USE OF INSULIN (HCC): ICD-10-CM

## 2020-08-31 RX ORDER — PEN NEEDLE, DIABETIC 32GX 5/32"
NEEDLE, DISPOSABLE MISCELLANEOUS
Qty: 100 EACH | Refills: 0 | Status: SHIPPED | OUTPATIENT
Start: 2020-08-31 | End: 2020-12-07

## 2020-09-01 LAB
ALBUMIN SERPL-MCNC: 3.9 G/DL (ref 3.8–4.8)
ALBUMIN/CREAT UR: 17 MG/G CREAT (ref 0–29)
ALBUMIN/GLOB SERPL: 1.5 {RATIO} (ref 1.2–2.2)
ALP SERPL-CCNC: 97 IU/L (ref 39–117)
ALT SERPL-CCNC: 18 IU/L (ref 0–44)
AST SERPL-CCNC: 17 IU/L (ref 0–40)
BASOPHILS # BLD AUTO: 0.1 X10E3/UL (ref 0–0.2)
BASOPHILS NFR BLD AUTO: 1 %
BILIRUB SERPL-MCNC: 0.6 MG/DL (ref 0–1.2)
BUN SERPL-MCNC: 22 MG/DL (ref 8–27)
BUN/CREAT SERPL: 14 (ref 10–24)
CALCIUM SERPL-MCNC: 9.3 MG/DL (ref 8.6–10.2)
CHLORIDE SERPL-SCNC: 104 MMOL/L (ref 96–106)
CHOLEST SERPL-MCNC: 127 MG/DL (ref 100–199)
CO2 SERPL-SCNC: 18 MMOL/L (ref 20–29)
CREAT SERPL-MCNC: 1.57 MG/DL (ref 0.76–1.27)
CREAT UR-MCNC: 94.2 MG/DL
EOSINOPHIL # BLD AUTO: 0.4 X10E3/UL (ref 0–0.4)
EOSINOPHIL NFR BLD AUTO: 5 %
ERYTHROCYTE [DISTWIDTH] IN BLOOD BY AUTOMATED COUNT: 13.2 % (ref 11.6–15.4)
GLOBULIN SER CALC-MCNC: 2.6 G/DL (ref 1.5–4.5)
GLUCOSE SERPL-MCNC: 124 MG/DL (ref 65–99)
HBA1C MFR BLD: 7.2 % (ref 4.8–5.6)
HCT VFR BLD AUTO: 41 % (ref 37.5–51)
HDLC SERPL-MCNC: 40 MG/DL
HGB BLD-MCNC: 13.9 G/DL (ref 13–17.7)
IMM GRANULOCYTES # BLD AUTO: 0 X10E3/UL (ref 0–0.1)
IMM GRANULOCYTES NFR BLD AUTO: 0 %
IMMATURE CELLS  115398: ABNORMAL
LABORATORY COMMENT REPORT: NORMAL
LDLC SERPL CALC-MCNC: 65 MG/DL (ref 0–99)
LYMPHOCYTES # BLD AUTO: 2.2 X10E3/UL (ref 0.7–3.1)
LYMPHOCYTES NFR BLD AUTO: 27 %
MCH RBC QN AUTO: 32.6 PG (ref 26.6–33)
MCHC RBC AUTO-ENTMCNC: 33.9 G/DL (ref 31.5–35.7)
MCV RBC AUTO: 96 FL (ref 79–97)
MICROALBUMIN UR-MCNC: 16 UG/ML
MONOCYTES # BLD AUTO: 1 X10E3/UL (ref 0.1–0.9)
MONOCYTES NFR BLD AUTO: 13 %
MORPHOLOGY BLD-IMP: ABNORMAL
NEUTROPHILS # BLD AUTO: 4.5 X10E3/UL (ref 1.4–7)
NEUTROPHILS NFR BLD AUTO: 54 %
NRBC BLD AUTO-RTO: ABNORMAL %
PLATELET # BLD AUTO: 211 X10E3/UL (ref 150–450)
POTASSIUM SERPL-SCNC: 4.5 MMOL/L (ref 3.5–5.2)
PROT SERPL-MCNC: 6.5 G/DL (ref 6–8.5)
RBC # BLD AUTO: 4.27 X10E6/UL (ref 4.14–5.8)
SODIUM SERPL-SCNC: 138 MMOL/L (ref 134–144)
TRIGL SERPL-MCNC: 123 MG/DL (ref 0–149)
VLDLC SERPL CALC-MCNC: 22 MG/DL (ref 5–40)
WBC # BLD AUTO: 8.2 X10E3/UL (ref 3.4–10.8)

## 2020-09-11 ENCOUNTER — TELEPHONE (OUTPATIENT)
Dept: MEDICAL GROUP | Age: 65
End: 2020-09-11

## 2020-09-11 NOTE — TELEPHONE ENCOUNTER
Phone Number Called: [phone] (home)       Call outcome: Did not leave a detailed message. Requested patient to call back.    Message: LVM 1st attempt

## 2020-09-11 NOTE — TELEPHONE ENCOUNTER
----- Message from Chely Vanessa M.D. sent at 9/11/2020  7:39 AM PDT -----  Please schedule appointment for lab review.

## 2020-10-13 LAB
ALBUMIN SERPL-MCNC: 4.1 G/DL (ref 3.8–4.8)
ALBUMIN/CREAT UR: 20 MG/G CREAT (ref 0–29)
ALBUMIN/GLOB SERPL: 1.7 {RATIO} (ref 1.2–2.2)
ALP SERPL-CCNC: 109 IU/L (ref 39–117)
ALT SERPL-CCNC: 19 IU/L (ref 0–44)
AST SERPL-CCNC: 15 IU/L (ref 0–40)
BILIRUB SERPL-MCNC: 0.7 MG/DL (ref 0–1.2)
BUN SERPL-MCNC: 20 MG/DL (ref 8–27)
BUN/CREAT SERPL: 14 (ref 10–24)
CALCIUM SERPL-MCNC: 9.5 MG/DL (ref 8.6–10.2)
CHLORIDE SERPL-SCNC: 105 MMOL/L (ref 96–106)
CHOLEST SERPL-MCNC: 130 MG/DL (ref 100–199)
CO2 SERPL-SCNC: 16 MMOL/L (ref 20–29)
CREAT SERPL-MCNC: 1.48 MG/DL (ref 0.76–1.27)
CREAT UR-MCNC: 98.8 MG/DL
GLOBULIN SER CALC-MCNC: 2.4 G/DL (ref 1.5–4.5)
GLUCOSE SERPL-MCNC: 134 MG/DL (ref 65–99)
HBA1C MFR BLD: 7 % (ref 4.8–5.6)
HDLC SERPL-MCNC: 41 MG/DL
LABORATORY COMMENT REPORT: NORMAL
LDLC SERPL CALC-MCNC: 69 MG/DL (ref 0–99)
MICROALBUMIN UR-MCNC: 19.9 UG/ML
POTASSIUM SERPL-SCNC: 4.5 MMOL/L (ref 3.5–5.2)
PROT SERPL-MCNC: 6.5 G/DL (ref 6–8.5)
SODIUM SERPL-SCNC: 137 MMOL/L (ref 134–144)
T4 FREE SERPL-MCNC: 1.37 NG/DL (ref 0.82–1.77)
TRIGL SERPL-MCNC: 111 MG/DL (ref 0–149)
TSH SERPL DL<=0.005 MIU/L-ACNC: 2.56 UIU/ML (ref 0.45–4.5)
VLDLC SERPL CALC-MCNC: 20 MG/DL (ref 5–40)

## 2020-10-19 ENCOUNTER — OFFICE VISIT (OUTPATIENT)
Dept: MEDICAL GROUP | Age: 65
End: 2020-10-19
Payer: MEDICARE

## 2020-10-19 VITALS
HEART RATE: 99 BPM | WEIGHT: 315 LBS | BODY MASS INDEX: 42.66 KG/M2 | DIASTOLIC BLOOD PRESSURE: 76 MMHG | OXYGEN SATURATION: 95 % | SYSTOLIC BLOOD PRESSURE: 132 MMHG | HEIGHT: 72 IN | TEMPERATURE: 97.4 F

## 2020-10-19 DIAGNOSIS — E66.01 MORBID OBESITY WITH BMI OF 40.0-44.9, ADULT (HCC): ICD-10-CM

## 2020-10-19 DIAGNOSIS — R19.5 POSITIVE FIT (FECAL IMMUNOCHEMICAL TEST): ICD-10-CM

## 2020-10-19 DIAGNOSIS — I25.2 HISTORY OF ACUTE ANTERIOR WALL MYOCARDIAL INFARCTION: ICD-10-CM

## 2020-10-19 DIAGNOSIS — N18.30 CKD STAGE 3 DUE TO TYPE 2 DIABETES MELLITUS (HCC): ICD-10-CM

## 2020-10-19 DIAGNOSIS — Z95.810 PRESENCE OF SINGLE CHAMBER IMPLANTABLE CARDIOVERTER-DEFIBRILLATOR (ICD): ICD-10-CM

## 2020-10-19 DIAGNOSIS — E11.22 TYPE 2 DIABETES MELLITUS WITH STAGE 3A CHRONIC KIDNEY DISEASE, WITHOUT LONG-TERM CURRENT USE OF INSULIN (HCC): ICD-10-CM

## 2020-10-19 DIAGNOSIS — I10 ESSENTIAL HYPERTENSION: ICD-10-CM

## 2020-10-19 DIAGNOSIS — I25.10 CORONARY ARTERY DISEASE INVOLVING NATIVE CORONARY ARTERY OF NATIVE HEART WITHOUT ANGINA PECTORIS: ICD-10-CM

## 2020-10-19 DIAGNOSIS — E78.5 DYSLIPIDEMIA: ICD-10-CM

## 2020-10-19 DIAGNOSIS — Z23 NEED FOR VACCINATION: ICD-10-CM

## 2020-10-19 DIAGNOSIS — Z00.00 MEDICARE ANNUAL WELLNESS VISIT, SUBSEQUENT: Primary | ICD-10-CM

## 2020-10-19 DIAGNOSIS — Z12.11 COLON CANCER SCREENING: ICD-10-CM

## 2020-10-19 DIAGNOSIS — N18.31 TYPE 2 DIABETES MELLITUS WITH STAGE 3A CHRONIC KIDNEY DISEASE, WITHOUT LONG-TERM CURRENT USE OF INSULIN (HCC): ICD-10-CM

## 2020-10-19 DIAGNOSIS — E11.22 CKD STAGE 3 DUE TO TYPE 2 DIABETES MELLITUS (HCC): ICD-10-CM

## 2020-10-19 PROCEDURE — 90746 HEPB VACCINE 3 DOSE ADULT IM: CPT | Performed by: FAMILY MEDICINE

## 2020-10-19 PROCEDURE — G0439 PPPS, SUBSEQ VISIT: HCPCS | Performed by: FAMILY MEDICINE

## 2020-10-19 PROCEDURE — G0010 ADMIN HEPATITIS B VACCINE: HCPCS | Performed by: FAMILY MEDICINE

## 2020-10-19 ASSESSMENT — ENCOUNTER SYMPTOMS: GENERAL WELL-BEING: GOOD

## 2020-10-19 ASSESSMENT — FIBROSIS 4 INDEX: FIB4 SCORE: 1.06

## 2020-10-19 ASSESSMENT — ACTIVITIES OF DAILY LIVING (ADL): BATHING_REQUIRES_ASSISTANCE: 0

## 2020-10-19 ASSESSMENT — PATIENT HEALTH QUESTIONNAIRE - PHQ9: CLINICAL INTERPRETATION OF PHQ2 SCORE: 0

## 2020-10-19 NOTE — PROGRESS NOTES
CC: ISABELLA      HPI:  Perez Leone is a 65 y.o. here for Medicare Annual Wellness Visit     Pt is doing well.  He is compliant with all medication.  He tolerates them well, no side effects reported.  Patient has been trying to walk at least 1 hour/day.  His weight remains stable.  He continues to follow-up with endocrinology and cardiology for diabetes and CAD respectively.      Patient Active Problem List    Diagnosis Date Noted   • Presence of single chamber implantable cardioverter-defibrillator (ICD) 08/06/2018   • CKD stage 3 due to type 2 diabetes mellitus ([facility]) 05/08/2017   • Positive FIT (fecal immunochemical test) 05/08/2017   • Type 2 diabetes mellitus with stage 3 cronic kidney disease, without long-term current use of insulin ([facility])_Dr. Macdonald 02/13/2017   • Dyslipidemia 07/11/2016   • Essential hypertension 07/11/2016   • History of acute anterior wall myocardial infarction 03/09/2012   • CAD s/p stent LAD in 2003_Dr. Sung 03/09/2012   • Morbid obesity with BMI of 40.0-44.9, adult ([facility]) 03/09/2012       Current Outpatient Medications   Medication Sig Dispense Refill   • BD PEN NEEDLE ROSMERY U/F USE 1 PEN NEEDLE THREE TIMES DAILY WITH MEALS 100 Each 0   • pioglitazone (ACTOS) 30 MG Tab Take 1 Tab by mouth every day for 100 days. 100 Tab 3   • Empagliflozin-metFORMIN HCl ER 12.5-1000 MG TABLET SR 24 HR Take 1 Tab by mouth 2 times a day. 60 Tab 11   • VICTOZA 18 MG/3ML Solution Pen-injector Inject 1.8 mg as instructed every day. 3 Each 11   • metoprolol SR (TOPROL XL) 100 MG TABLET SR 24 HR Take 1 tablet by mouth once daily 90 Tab 0   • Glucose Blood (BLOOD GLUCOSE TEST STRIPS) Strip Test your blood sugar twice daily and PRN for symptoms of high or low blood sugar 100 Strip 5   • Lancets Check blood sugar 2 times daily and prn symptoms high or low sugar. 100 Each 5   • nitroglycerin (NITROSTAT) 0.4 MG SL Tab DISSOLVE ONE TABLET UNDER THE TONGUE EVERY 5 MINUTES AS NEEDED FOR CHEST PAIN.  DO NOT EXCEED A  TOTAL OF 3 DOSES IN 15 MINUTES 30 Tab 8   • Blood Glucose Monitoring Suppl Supplies Misc Test strips order: Test strips for Freestyle Lite meter. Sig: use twice a day and prn ssx high or low sugar 100 Each 3   • moexipril (UNIVASC) 7.5 MG Tab Take 1 Tab by mouth every day. 90 Tab 3   • atorvastatin (LIPITOR) 40 MG TABS Take 1 Tab by mouth every evening. 90 Each 1   • niacin SR (NIASPAN) 500 MG TBCR Take 1 Tab by mouth 3 times a day. 90 Each 6   • [facility] ASPIRIN 81 MG PO TBEC QAM.        No current facility-administered medications for this visit.             Current supplements as per medication list.       Allergies: Patient has no known allergies.    Current social contact/activities: singles, employed full time     He  reports that he has never smoked. He has never used smokeless tobacco. He reports that he does not drink alcohol or use drugs.  Counseling given: Not Answered      DPA/Advanced Directive:  Patient does not have an Advanced Directive.  A packet and workshop information was given on Advanced Directives.    ROS:    Gait: Uses no assistive device  Ostomy: No  Other tubes: No  Amputations: No  Chronic oxygen use: No  Last eye exam: 2019  Wears hearing aids: No   : Denies any urinary leakage during the last 6 months    Screening:    POLST/AD    Does the patient have a POLST or Advanced Directive?       Depression Screening    Little interest or pleasure in doing things?  0 - not at all  Feeling down, depressed , or hopeless? 0 - not at all  Patient Health Questionnaire Score: 0     Screening for Cognitive Impairment    Three Minute Recall (river, nation, finger) 3/3    Gerhard clock face with all 12 numbers and set the hands to show 10 past 11.  Yes      Fall Risk Assessment    Has the patient had two or more falls in the last year or any fall with injury in the last year?  No    Safety Assessment    Throw rugs on floor.  No  Handrails on all stairs.  Yes  Good lighting in all hallways.   Yes  Difficulty hearing.  No  Patient counseled about all safety risks that were identified.    Functional Assessment ADLs    Are there any barriers preventing you from cooking for yourself or meeting nutritional needs?  No.    Are there any barriers preventing you from driving safely or obtaining transportation?  No.    Are there any barriers preventing you from using a telephone or calling for help?  No.    Are there any barriers preventing you from shopping?  No.    Are there any barriers preventing you from taking care of your own finances?  No.    Are there any barriers preventing you from managing your medications?  No.    Are there any barriers preventing you from showering, bathing or dressing yourself?  No.    Are you currently engaging in any exercise or physical activity?  Yes.     What is your perception of your health?  Good.      Health Maintenance Summary                IMM HEP B VACCINE Overdue 3/27/1974     COLONOSCOPY Overdue 3/27/2005     Annual Wellness Visit Overdue 4/5/2018      Done 4/4/2017 Visit Dx: Medicare annual wellness visit, initial    IMM INFLUENZA Overdue 9/1/2020      Done 10/7/2019 Imm Admin: Influenza Vaccine Quad Inj (Pf)     Patient has more history with this topic...    RETINAL SCREENING Next Due 10/21/2020      Done 10/21/2019 REFERRAL FOR RETINAL SCREENING EXAM     Patient has more history with this topic...    IMM ZOSTER VACCINES Postponed 3/19/2021 Originally 3/27/2005. System: vaccine not available, other system reasons    A1C SCREENING Next Due 1/12/2021      Done 10/12/2020 HEMOGLOBIN A1C     Patient has more history with this topic...    IMM PNEUMOCOCCAL VACCINE: 65+ Years Next Due 3/30/2021      Done 3/30/2016 Imm Admin: Pneumococcal polysaccharide vaccine (PPSV-23)    DIABETES MONOFILAMENT / LE EXAM Next Due 8/5/2021      Done 8/5/2020 [employer]: WORKFLOW - DIABETES - DIABETIC FOOT EXAM PERFORMED     Patient has more history with this topic...    FASTING LIPID PROFILE  Next Due 10/12/2021      Done 10/12/2020 LIPID PANEL     Patient has more history with this topic...    URINE ACR / MICROALBUMIN Next Due 10/12/2021      Done 10/12/2020 MICROALB/CREAT RATIO RAND. UR     Patient has more history with this topic...    SERUM CREATININE Next Due 10/12/2021      Done 10/12/2020 COMP METABOLIC PANEL     Patient has more history with this topic...    IMM DTaP/Tdap/Td Vaccine Next Due 10/7/2029      Done 10/7/2019 Imm Admin: Tdap Vaccine          Patient Care Team:  Chely Vanessa M.D. as PCP - General (Family Medicine)  Tunde Sung M.D. as Consulting Physician (Cardiology)  Vish Mota P.A.-C. as Consulting Physician (Endocrinology)        Social History     Tobacco Use   • Smoking status: Never Smoker   • Smokeless tobacco: Never Used   Substance Use Topics   • Alcohol use: No     Alcohol/week: 0.0 oz   • Drug use: No     Family History   Problem Relation Age of Onset   • Heart Disease Mother         CHF   • Diabetes Brother    • Other Father         son does not father's med hx     He  has a past medical history of Acute MI (HCC), Diabetes mellitus, Heme positive stool (6/8/2015), Hypertension, Overweight and obesity, and Pure hypercholesterolemia.   History reviewed. No pertinent surgical history.    Exam:   /76 (BP Location: Right arm, Patient Position: Sitting, BP Cuff Size: Adult long)   Pulse 99   Temp 36.3 °C (97.4 °F) (Temporal)   Ht 1.829 m (6')   Wt (!) 146.5 kg (322 lb 15.6 oz)   SpO2 95%  Body mass index is 43.8 kg/m².    Hearing excellent.    Dentition good  Alert, oriented in no acute distress.  Eye contact is good, speech goal directed, affect calm    Monofilament testing with a 10 gram force: sensation intact: intact bilaterally  Visual Inspection: Feet without maceration, ulcers, fissures.  Pedal pulses: intact bilaterally     Assessment and Plan. The following treatment and monitoring plan is recommended:      1. Type 2 diabetes mellitus with stage 3a  chronic kidney disease, without long-term current use of insulin (HCC)  Chronic, controlled with empagliflozin-Metformin 12.5-1000 mg twice daily, Victoza 1.8 mg qd, and pioglitazone 30 mg daily.  His most recent A1c was 7.0.  Patient is active and try to walk at least 1 hour/day.  He tolerates medication well, no side effects reported.  This condition is currently being managed by Dr. Macdonald.  -Continue empagliflozin-Metformin 12.5-1000 mg twice daily,   - continue pioglitazone 30 mg daily and Victoza 1.8 mg qd.   -Follow-up with Dr. Macdonald as directed.  - dietary modification, exercise, weight loss  - Annual eye exam  - Regular foot exam  - Discussed prevention and management of hypoglycemia  - Side effects of all medications discussed with patient  - Follow up in 6 months.   - Diabetic Monofilament Lower Extremity Exam  - CBC WITH DIFFERENTIAL; Future  - Comp Metabolic Panel; Future  - HEMOGLOBIN A1C; Future    2. CKD stage 3 due to type 2 diabetes mellitus (HCC)  Chronic, stable, will monitor.   Discussed hydration and avoidance of nephrotoxic drugs.      3. Morbid obesity with BMI of 40.0-44.9, adult (HCC)  - Patient identified as having weight management issue.  Appropriate orders and counseling given.    4. Essential hypertension  Chronic, controlled with Moexipril 7.5 mg and metoprolol 1 mg daily, no side effect reported, will continue both medication at current doses  - dietary modification, exercise, and weight loss.   - avoid alcohol, drugs, tobacco products     5. Dyslipidemia  Chronic, controlled with Lipitor 40 mg daily, no side effects reported, will continue.  - dietary modification, exercise, and weight loss.   - avoid alcohol, drugs, tobacco products     6. Coronary artery disease involving native coronary artery of native heart without angina pectoris  7. Presence of single chamber implantable cardioverter-defibrillator (ICD)  8. History of acute anterior wall myocardial infarction  Chronic,  stable, has consistent follow-up with cardiology.  Blood pressure and diabetes are under good control.  -Continue aspirin 81 mg daily, Lipitor 40 mg daily  -Continue treatment for diabetes and hypertension as above  -Follow-up with cardiology as directed  -Weight loss, lifestyle modification recommended    9. Need for vaccination  - Hepatitis B Vaccine Adult IM    10. Positive FIT (fecal immunochemical test)  11. Colon cancer screening  Patient had positive fit test and was referred to GI for colonoscopy.  However, patient has not scheduled the appointment.  He remained asymptomatic.  - REFERRAL TO GI FOR COLONOSCOPY    12. Medicare annual wellness visit, initial  General health and wellness counseling provided. Topics might include: diet, exercise, vitamin supplement, mental health, sleep, stress, preventive cares and vaccine recommendations.         Services suggested: No services needed at this time  Health Care Screening: Age-appropriate preventive services recommended by USPTF and ACIP covered by Medicare were discussed today. Services ordered if indicated and agreed upon by the patient.  Referrals offered: Community-based lifestyle interventions to reduce health risks and promote self-management and wellness, fall prevention, nutrition, physical activity, tobacco-use cessation, weight loss, and mental health services as per orders if indicated.    Discussion today about general wellness and lifestyle habits:    · Prevent falls and reduce trip hazards; Cautioned about securing or removing rugs.  · Have a working fire alarm and carbon monoxide detector;   · Engage in regular physical activity and social activities     Follow-up: Return in about 6 months (around 4/19/2021) for Multiple issues.

## 2020-11-05 NOTE — PROGRESS NOTES
"RN-CDE Note    Subjective:   HPI  Perez is a 65 year old male with controlled type 2 diabetes here for follow up  Health changes since last visit/interval Hx: None    Diabetes Medications:   Victoza 1.8 mg daily  Pioglitazone 30 mg daily  Synjardy 12.1000 mg bid  Taking above medications as prescribed: yes  Taking daily ASA:  yes      Exercise: no regular exercise, sedentary  Diet: \"healthy\" diet  in general  Patient's body mass index is unknown because there is no height or weight on file. Exercise and nutrition counseling were performed at this visit.      Health Maintenance:   Health Maintenance Due   Topic Date Due   • COLONOSCOPY  2005   • IMM HEP B VACCINE (2 of 3 - Risk 3-dose series) 2020         DM:   Last A1c:   Lab Results   Component Value Date/Time    HBA1C 7.0 (H) 10/12/2020 05:32 AM    HBA1C 6.8 (A) 2020 08:53 AM      A1C GOAL: < 7    Glucose monitoring frequency: testing blood sugars bid  Fastin-125  HS: 135-185     Hypoglycemic episodes: no    Last Retinal Exam: on file and up-to-date  Eye doctor is Jacob Evans we have his eye exam from Dr. Johnson on 2020 was negative so  Daily Foot Exam: Yes denies problems.   Routine Dental Exams: Yes    Lab Results   Component Value Date/Time    MICROALBCALC <3.2 2017 07:36 AM    MALBCRT 5 2014 07:38 AM    MICROALBUR 0.7 2014 07:38 AM    MICRALB 19.9 10/12/2020 05:32 AM        ACR Albumin/Creatinine Ratio goal <30     HTN:   Blood pressure goal <140/<80 at goal.   Currently Rx ACE/ARB: Yes  On Moesipril 7.5 mg daily    Dyslipidemia:    Lab Results   Component Value Date/Time    CHOLSTRLTOT 130 10/12/2020 05:32 AM    CHOLSTRLTOT 128 2014 07:38 AM    LDL 70 2019 04:52 AM    LDL 76 2014 07:38 AM    HDL 41 10/12/2020 05:32 AM    HDL 31 (A) 2014 07:38 AM    TRIGLYCERIDE 111 10/12/2020 05:32 AM    TRIGLYCERIDE 104 2014 07:38 AM       Lab Results   Component Value Date/Time    SODIUM " 137 10/12/2020 05:32 AM    SODIUM 135 01/13/2014 07:38 AM    POTASSIUM 4.5 10/12/2020 05:32 AM    POTASSIUM 4.3 01/13/2014 07:38 AM    CHLORIDE 105 10/12/2020 05:32 AM    CHLORIDE 103 01/13/2014 07:38 AM    CO2 16 (L) 10/12/2020 05:32 AM    CO2 24 01/13/2014 07:38 AM    GLUCOSE 134 (H) 10/12/2020 05:32 AM    GLUCOSE 305 (H) 01/13/2014 07:38 AM    BUN 20 10/12/2020 05:32 AM    BUN 17 01/13/2014 07:38 AM    CREATININE 1.48 (H) 10/12/2020 05:32 AM    CREATININE 1.12 01/13/2014 07:38 AM    CREATININE 0.98 10/19/2011 06:44 AM    BUNCREATRAT 14 10/12/2020 05:32 AM    BUNCREATRAT 12 10/19/2011 06:44 AM     Lab Results   Component Value Date/Time    ALKPHOSPHAT 109 10/12/2020 05:32 AM    ALKPHOSPHAT 77 01/13/2014 07:38 AM    ASTSGOT 15 10/12/2020 05:32 AM    ASTSGOT 13 01/13/2014 07:38 AM    ALTSGPT 19 10/12/2020 05:32 AM    ALTSGPT 17 01/13/2014 07:38 AM    TBILIRUBIN 0.7 10/12/2020 05:32 AM    TBILIRUBIN 0.9 01/13/2014 07:38 AM        Currently Rx Statin: Yes  On Atorvastatin 40 mg daily    He  reports that he has never smoked. He has never used smokeless tobacco.    Objective:   /74 (BP Location: Left arm, Patient Position: Sitting, BP Cuff Size: Large adult)   Pulse (!) 104   Temp 36.1 °C (97 °F)   Ht 1.829 m (6')   Wt (!) 146.1 kg (322 lb)   SpO2 93%   BMI 43.67 kg/m²   GENERAL: There morbidly obese male in no apparent distress.   EYE:  No ocular asymmetry, PERRLA  HENT: Pink, moist mucous membranes.    NECK: No thyromegaly.   CARDIOVASCULAR: Normal precordial impulse seen with normal carotid pulsation  LUNGS: Symmetrical chest expansion with normal phonation of voice   ABDOMEN: Obese abdomen with no visible organomegaly  EXTREMITIES: No clubbing, cyanosis, or edema.   NEUROLOGICAL: No gross focal motor abnormalities   LYMPH: No cervical adenopathy seen.   SKIN: No rashes, lesions.  Visible acanthosis sent      Exam:  Monofilament: not done    Plan:   1. Type 2 diabetes mellitus with stage 3a chronic kidney  disease, without long-term current use of insulin ([facility])  Well-controlled type II diabetic, continue current medications particularly Victoza, Actos and Synjardy.  We will see him again in 6 months with repeat of his A1c    - Discussed diabetic diet, encouraged portion control.   - Discussed importance of testing blood sugars and keeping logs.   - Discussed importance of daily exercise, recommended 30 minutes per day  - Reviewed medications and advised how to take.  - Discussed importance of immunizations and yearly eye exams.   - Advised daily foot  exams. Educated on signs of infection.   - Educated on need to stay well hydrated with water.  - Educated to call with any questions or problems.        2. Dyslipidemia  Well-controlled continue current medications  We will repeat fasting lipids in 12 months    3. Essential hypertension  Well-controlled we will repeat urine microalbumin 6 months    4. Morbid obesity with BMI of 40.0-44.9, adult ([facility])  Stable

## 2020-11-06 ENCOUNTER — OFFICE VISIT (OUTPATIENT)
Dept: ENDOCRINOLOGY | Facility: MEDICAL CENTER | Age: 65
End: 2020-11-06
Attending: INTERNAL MEDICINE
Payer: MEDICARE

## 2020-11-06 VITALS
HEART RATE: 104 BPM | BODY MASS INDEX: 42.66 KG/M2 | WEIGHT: 315 LBS | OXYGEN SATURATION: 93 % | TEMPERATURE: 97 F | HEIGHT: 72 IN | DIASTOLIC BLOOD PRESSURE: 74 MMHG | SYSTOLIC BLOOD PRESSURE: 122 MMHG

## 2020-11-06 DIAGNOSIS — N18.31 TYPE 2 DIABETES MELLITUS WITH STAGE 3A CHRONIC KIDNEY DISEASE, WITHOUT LONG-TERM CURRENT USE OF INSULIN (HCC): ICD-10-CM

## 2020-11-06 DIAGNOSIS — E66.01 MORBID OBESITY WITH BMI OF 40.0-44.9, ADULT (HCC): ICD-10-CM

## 2020-11-06 DIAGNOSIS — I10 ESSENTIAL HYPERTENSION: ICD-10-CM

## 2020-11-06 DIAGNOSIS — E78.5 DYSLIPIDEMIA: ICD-10-CM

## 2020-11-06 DIAGNOSIS — E11.22 TYPE 2 DIABETES MELLITUS WITH STAGE 3A CHRONIC KIDNEY DISEASE, WITHOUT LONG-TERM CURRENT USE OF INSULIN (HCC): ICD-10-CM

## 2020-11-06 PROCEDURE — 99214 OFFICE O/P EST MOD 30 MIN: CPT | Performed by: INTERNAL MEDICINE

## 2020-11-06 PROCEDURE — 99211 OFF/OP EST MAY X REQ PHY/QHP: CPT | Performed by: INTERNAL MEDICINE

## 2020-11-06 ASSESSMENT — FIBROSIS 4 INDEX: FIB4 SCORE: 1.06

## 2020-12-05 DIAGNOSIS — E11.22 CONTROLLED TYPE 2 DIABETES MELLITUS WITH STAGE 3 CHRONIC KIDNEY DISEASE, WITHOUT LONG-TERM CURRENT USE OF INSULIN (HCC): ICD-10-CM

## 2020-12-05 DIAGNOSIS — N18.30 CONTROLLED TYPE 2 DIABETES MELLITUS WITH STAGE 3 CHRONIC KIDNEY DISEASE, WITHOUT LONG-TERM CURRENT USE OF INSULIN (HCC): ICD-10-CM

## 2020-12-07 RX ORDER — PEN NEEDLE, DIABETIC 32GX 5/32"
NEEDLE, DISPOSABLE MISCELLANEOUS
Qty: 100 EACH | Refills: 0 | Status: SHIPPED | OUTPATIENT
Start: 2020-12-07 | End: 2021-03-15

## 2020-12-07 NOTE — TELEPHONE ENCOUNTER
Received request via: Pharmacy    Was the patient seen in the last year in this department? Yes    Does the patient have an active prescription (recently filled or refills available) for medication(s) requested? No     BD PEN NEEDLE/ROSMERY [id number] MIS        Will file in chart as: BD PEN NEEDLE ROSMERY U/F   Sig: USE 1 NEEDLE TO INJECT INSULIN THREE TIMES DAILY WITH MEALS   Disp:  100 Each    Refills:  0   Start: 12/5/2020   Class: Normal   Non-formulary For: Controlled type 2 diabetes mellitus with stage 3 chronic kidney disease, without long-term current use of insulin (HCC)   Last ordered: 3 months ago by Darius Macdonald M.D. Last refill: 9/1/2020   Rx #: [id number]

## 2020-12-10 DIAGNOSIS — I10 ESSENTIAL HYPERTENSION: ICD-10-CM

## 2020-12-10 DIAGNOSIS — I25.2 HISTORY OF ACUTE ANTERIOR WALL MYOCARDIAL INFARCTION: ICD-10-CM

## 2020-12-10 RX ORDER — METOPROLOL SUCCINATE 100 MG/1
TABLET, EXTENDED RELEASE ORAL
Qty: 90 TAB | Refills: 1 | Status: SHIPPED | OUTPATIENT
Start: 2020-12-10 | End: 2021-09-14

## 2021-03-03 DIAGNOSIS — Z23 NEED FOR VACCINATION: ICD-10-CM

## 2021-04-12 DIAGNOSIS — N18.30 TYPE 2 DIABETES MELLITUS WITH STAGE 3 CHRONIC KIDNEY DISEASE, WITHOUT LONG-TERM CURRENT USE OF INSULIN (HCC): ICD-10-CM

## 2021-04-12 DIAGNOSIS — E11.22 TYPE 2 DIABETES MELLITUS WITH STAGE 3 CHRONIC KIDNEY DISEASE, WITHOUT LONG-TERM CURRENT USE OF INSULIN (HCC): ICD-10-CM

## 2021-04-13 LAB
ALBUMIN SERPL-MCNC: 3.9 G/DL (ref 3.8–4.8)
ALBUMIN/GLOB SERPL: 1.6 {RATIO} (ref 1.2–2.2)
ALP SERPL-CCNC: 96 IU/L (ref 39–117)
ALT SERPL-CCNC: 16 IU/L (ref 0–44)
AST SERPL-CCNC: 14 IU/L (ref 0–40)
BASOPHILS # BLD AUTO: 0.1 X10E3/UL (ref 0–0.2)
BASOPHILS NFR BLD AUTO: 1 %
BILIRUB SERPL-MCNC: 0.6 MG/DL (ref 0–1.2)
BUN SERPL-MCNC: 19 MG/DL (ref 8–27)
BUN/CREAT SERPL: 13 (ref 10–24)
CALCIUM SERPL-MCNC: 9.1 MG/DL (ref 8.6–10.2)
CHLORIDE SERPL-SCNC: 107 MMOL/L (ref 96–106)
CO2 SERPL-SCNC: 19 MMOL/L (ref 20–29)
CREAT SERPL-MCNC: 1.41 MG/DL (ref 0.76–1.27)
EOSINOPHIL # BLD AUTO: 0.4 X10E3/UL (ref 0–0.4)
EOSINOPHIL NFR BLD AUTO: 5 %
ERYTHROCYTE [DISTWIDTH] IN BLOOD BY AUTOMATED COUNT: 13.5 % (ref 11.6–15.4)
GLOBULIN SER CALC-MCNC: 2.5 G/DL (ref 1.5–4.5)
GLUCOSE SERPL-MCNC: 150 MG/DL (ref 65–99)
HBA1C MFR BLD: 7.9 % (ref 4.8–5.6)
HCT VFR BLD AUTO: 41.8 % (ref 37.5–51)
HGB BLD-MCNC: 13.7 G/DL (ref 13–17.7)
IMM GRANULOCYTES # BLD AUTO: 0 X10E3/UL (ref 0–0.1)
IMM GRANULOCYTES NFR BLD AUTO: 0 %
IMMATURE CELLS  115398: NORMAL
LYMPHOCYTES # BLD AUTO: 2.3 X10E3/UL (ref 0.7–3.1)
LYMPHOCYTES NFR BLD AUTO: 26 %
MCH RBC QN AUTO: 31.9 PG (ref 26.6–33)
MCHC RBC AUTO-ENTMCNC: 32.8 G/DL (ref 31.5–35.7)
MCV RBC AUTO: 97 FL (ref 79–97)
MONOCYTES # BLD AUTO: 0.9 X10E3/UL (ref 0.1–0.9)
MONOCYTES NFR BLD AUTO: 10 %
MORPHOLOGY BLD-IMP: NORMAL
NEUTROPHILS # BLD AUTO: 5 X10E3/UL (ref 1.4–7)
NEUTROPHILS NFR BLD AUTO: 58 %
NRBC BLD AUTO-RTO: NORMAL %
PLATELET # BLD AUTO: 210 X10E3/UL (ref 150–450)
POTASSIUM SERPL-SCNC: 4.7 MMOL/L (ref 3.5–5.2)
PROT SERPL-MCNC: 6.4 G/DL (ref 6–8.5)
RBC # BLD AUTO: 4.29 X10E6/UL (ref 4.14–5.8)
SODIUM SERPL-SCNC: 138 MMOL/L (ref 134–144)
WBC # BLD AUTO: 8.8 X10E3/UL (ref 3.4–10.8)

## 2021-04-13 RX ORDER — BLOOD-GLUCOSE METER
KIT MISCELLANEOUS
Qty: 100 STRIP | Refills: 1 | Status: SHIPPED | OUTPATIENT
Start: 2021-04-13 | End: 2021-07-20 | Stop reason: SDUPTHER

## 2021-04-13 RX ORDER — LANCETS 28 GAUGE
EACH MISCELLANEOUS
Qty: 100 EACH | Refills: 1 | Status: SHIPPED | OUTPATIENT
Start: 2021-04-13 | End: 2021-10-26

## 2021-04-21 ENCOUNTER — TELEPHONE (OUTPATIENT)
Dept: MEDICAL GROUP | Age: 66
End: 2021-04-21

## 2021-04-21 NOTE — TELEPHONE ENCOUNTER
ESTABLISHED PATIENT PRE-VISIT PLANNING   Wednesday, 04/21/21@5:00PM:    Patient was NOT contacted to complete PVP.     Note: Patient will not be contacted if there is no indication to call.     1.  Reviewed notes from the last few office visits within the medical group: Yes    2.  If any orders were placed at last visit or intended to be done for this visit (i.e. 6 mos follow-up), do we have Results/Consult Notes?         •  Labs - Labs ordered, completed on 04/11/21 and results are in chart.  Note: If patient appointment is for lab review and patient did not complete labs, check with provider if OK to reschedule patient until labs completed.       •  Imaging - Imaging was not ordered at last office visit.       •  Referrals - Referral ordered, patient has NOT been seen.    3. Is this appointment scheduled as a Hospital Follow-Up? No    4.  Immunizations were updated in Epic using Reconcile Outside Information activity? Yes    5.  Patient is due for the following Health Maintenance Topics:   Health Maintenance Due   Topic Date Due   • COLONOSCOPY  Never done   • IMM ZOSTER VACCINES (1 of 2) Never done   • IMM HEP B VACCINE (2 of 3 - Risk 3-dose series) 11/16/2020       6.  AHA (Pulse8) form printed for Provider? N/A

## 2021-04-22 ENCOUNTER — NON-PROVIDER VISIT (OUTPATIENT)
Dept: MEDICAL GROUP | Age: 66
End: 2021-04-22

## 2021-04-22 ENCOUNTER — TELEMEDICINE (OUTPATIENT)
Dept: MEDICAL GROUP | Age: 66
End: 2021-04-22
Payer: MEDICARE

## 2021-04-22 VITALS
WEIGHT: 300 LBS | DIASTOLIC BLOOD PRESSURE: 80 MMHG | HEIGHT: 72 IN | SYSTOLIC BLOOD PRESSURE: 125 MMHG | BODY MASS INDEX: 40.63 KG/M2

## 2021-04-22 DIAGNOSIS — I25.10 CORONARY ARTERY DISEASE INVOLVING NATIVE CORONARY ARTERY OF NATIVE HEART WITHOUT ANGINA PECTORIS: ICD-10-CM

## 2021-04-22 DIAGNOSIS — E78.5 DYSLIPIDEMIA: ICD-10-CM

## 2021-04-22 DIAGNOSIS — N18.30 CKD STAGE 3 DUE TO TYPE 2 DIABETES MELLITUS (HCC): ICD-10-CM

## 2021-04-22 DIAGNOSIS — E11.22 CKD STAGE 3 DUE TO TYPE 2 DIABETES MELLITUS (HCC): ICD-10-CM

## 2021-04-22 DIAGNOSIS — N18.31 TYPE 2 DIABETES MELLITUS WITH STAGE 3A CHRONIC KIDNEY DISEASE, WITHOUT LONG-TERM CURRENT USE OF INSULIN (HCC): Primary | ICD-10-CM

## 2021-04-22 DIAGNOSIS — E66.01 MORBID OBESITY WITH BMI OF 40.0-44.9, ADULT (HCC): ICD-10-CM

## 2021-04-22 DIAGNOSIS — I10 ESSENTIAL HYPERTENSION: ICD-10-CM

## 2021-04-22 DIAGNOSIS — Z23 NEED FOR VACCINATION: ICD-10-CM

## 2021-04-22 DIAGNOSIS — Z12.11 SCREENING FOR COLORECTAL CANCER: ICD-10-CM

## 2021-04-22 DIAGNOSIS — E11.22 TYPE 2 DIABETES MELLITUS WITH STAGE 3A CHRONIC KIDNEY DISEASE, WITHOUT LONG-TERM CURRENT USE OF INSULIN (HCC): Primary | ICD-10-CM

## 2021-04-22 DIAGNOSIS — R19.5 POSITIVE FIT (FECAL IMMUNOCHEMICAL TEST): ICD-10-CM

## 2021-04-22 DIAGNOSIS — Z12.12 SCREENING FOR COLORECTAL CANCER: ICD-10-CM

## 2021-04-22 PROCEDURE — 99214 OFFICE O/P EST MOD 30 MIN: CPT | Mod: 95 | Performed by: FAMILY MEDICINE

## 2021-04-22 PROCEDURE — 90750 HZV VACC RECOMBINANT IM: CPT | Performed by: FAMILY MEDICINE

## 2021-04-22 PROCEDURE — 90472 IMMUNIZATION ADMIN EACH ADD: CPT | Performed by: FAMILY MEDICINE

## 2021-04-22 PROCEDURE — G0010 ADMIN HEPATITIS B VACCINE: HCPCS | Performed by: FAMILY MEDICINE

## 2021-04-22 PROCEDURE — 90746 HEPB VACCINE 3 DOSE ADULT IM: CPT | Performed by: FAMILY MEDICINE

## 2021-04-22 RX ORDER — PIOGLITAZONEHYDROCHLORIDE 30 MG/1
30 TABLET ORAL DAILY
COMMUNITY
End: 2021-08-20

## 2021-04-22 ASSESSMENT — FIBROSIS 4 INDEX: FIB4 SCORE: 1.1

## 2021-04-22 ASSESSMENT — PATIENT HEALTH QUESTIONNAIRE - PHQ9: CLINICAL INTERPRETATION OF PHQ2 SCORE: 0

## 2021-04-22 NOTE — PROGRESS NOTES
Virtual Visit: Established Patient   This visit was conducted via Zoom using secure and encrypted videoconferencing technology. The patient was in a private location in the state of [place].    The patient's identity was confirmed and verbal consent was obtained for this virtual visit.    Subjective:   CC: annual PE     Perez Leone is a 66 y.o. male with chronic type 2 diabetes, CKD stage III, hypertension, morbid obesity, hyperlipidemia, and CAD status post stent x1 in 2003.  Patient is doing well.  He is taking all medications as directed.  He tolerates them well, no side effect reported.  Negative visual changes, concerning lesion on his feet, symptoms of polyneuropathy, or hypoglycemia.  Patient is active and tries to exercise regularly.  He has lost approximately 22 pounds over the past few months from dietary and lifestyle modification.  He is motivated to continues to work on weight loss.  Patient continue to follow-up with endocrinology and cardiology.  No active chest pain, shortness of breath, dyspnea on exertion, orthopnea, unusual pedal edema.  Patient is up-to-date with Covid vaccine.    Patient was found to have positive fit test last year.  He was referred to gastroenterology in October 2020.  However, patient had not scheduled appointment for colonoscopy.  Patient denies any active GI symptoms currently.      ROS   Denies any recent fevers or chills. No nausea or vomiting. No chest pains or shortness of breath.     No Known Allergies    Current medicines (including changes today)  Current Outpatient Medications   Medication Sig Dispense Refill   • pioglitazone (ACTOS) 30 MG Tab Take 30 mg by mouth every day.     • FreeStyle Lancets Misc USE 1  TO CHECK GLUCOSE ONCE DAILY FOR  LOW  SUGAR  SYMPTOMS  (E11.22) 100 Each 1   • glucose blood (FREESTYLE LITE) strip USE 1 STRIP TO CHECK GLUCOSE TWICE DAILY AND AS NEEDED FOR SYMPTOMS OF HIGH OR LOW BLOOD SUGAR 100 Strip 1   • BD PEN NEEDLE ROSMERY 2ND GEN  USE 1 NEEDLE TO INJECT INSULIN THREE TIMES DAILY WITH MEALS 100 Each 11   • metoprolol SR (TOPROL XL) 100 MG TABLET SR 24 HR Take 1 tablet by mouth once daily 90 Tab 1   • Empagliflozin-metFORMIN HCl ER 12.5-1000 MG TABLET SR 24 HR Take 1 Tab by mouth 2 times a day. 60 Tab 11   • VICTOZA 18 MG/3ML Solution Pen-injector Inject 1.8 mg as instructed every day. 3 Each 11   • nitroglycerin (NITROSTAT) 0.4 MG SL Tab DISSOLVE ONE TABLET UNDER THE TONGUE EVERY 5 MINUTES AS NEEDED FOR CHEST PAIN.  DO NOT EXCEED A TOTAL OF 3 DOSES IN 15 MINUTES 30 Tab 8   • Blood Glucose Monitoring Suppl Supplies Misc Test strips order: Test strips for Freestyle Lite meter. Sig: use twice a day and prn ssx high or low sugar 100 Each 3   • moexipril (UNIVASC) 7.5 MG Tab Take 1 Tab by mouth every day. 90 Tab 3   • atorvastatin (LIPITOR) 40 MG TABS Take 1 Tab by mouth every evening. 90 Each 1   • niacin SR (NIASPAN) 500 MG TBCR Take 1 Tab by mouth 3 times a day. 90 Each 6   • [facility] ASPIRIN 81 MG PO TBEC QAM.        No current facility-administered medications for this visit.       Patient Active Problem List    Diagnosis Date Noted   • Presence of single chamber implantable cardioverter-defibrillator (ICD) 08/06/2018   • CKD stage 3 due to type 2 diabetes mellitus ([facility]) 05/08/2017   • Positive FIT (fecal immunochemical test) 05/08/2017   • Type 2 diabetes mellitus with stage 3 cronic kidney disease, without long-term current use of insulin ([facility])_Dr. Macdonald 02/13/2017   • Dyslipidemia 07/11/2016   • Essential hypertension 07/11/2016   • History of acute anterior wall myocardial infarction 03/09/2012   • CAD s/p stent LAD in 2003_Dr. Sung 03/09/2012   • Morbid obesity with BMI of 40.0-44.9, adult ([facility]) 03/09/2012       Family History   Problem Relation Age of Onset   • Heart Disease Mother         CHF   • Diabetes Brother    • Other Father         son does not father's med hx       He  has a past medical history of Acute MI ([facility]),  Diabetes mellitus, Heme positive stool (6/8/2015), Hypertension, Overweight and obesity, and Pure hypercholesterolemia.  He  has no past surgical history on file.       Objective:   /80   Ht 1.829 m (6')   Wt (!) 136 kg (300 lb) Comment: pt stated  BMI 40.69 kg/m²     Physical Exam:  Constitutional: Alert, no distress, well-groomed.  Skin: No rashes in visible areas.  Eye: Round. Conjunctiva clear, lids normal. No icterus.   ENMT: Lips pink without lesions, good dentition, moist mucous membranes. Phonation normal.  Neck: No masses, no thyromegaly. Moves freely without pain.  Respiratory: Unlabored respiratory effort, no cough or audible wheeze  Psych: Alert and oriented x3, normal affect and mood.       Assessment and Plan:   The following treatment plan was discussed:     1. Essential hypertension  Chronic, controlled with metoprolol  mg, moexipril 7.5 mg daily.  Patient tolerated all medications well, no side effect reported.    2. Type 2 diabetes mellitus with stage 3a chronic kidney disease, without long-term current use of insulin (HCC)  Chronic, currently taking Victoza 1.8 mg daily, pioglitazone 30 mg daily, and empagliflozin-Metformin 12.5-1000 mg twice daily.  Patient is compliant with medication.  His A1c increases from 7.0 to 7.9 per most recent blood tests.  He lost approximately 22 pounds since last office visit with dietary and lifestyle modification.  This condition is currently being managed by endocrinology.  Patient has follow-up with endocrinology in near future.  I will defer further management to Endo at this point.  - HEMOGLOBIN A1C; Future  - CBC WITH DIFFERENTIAL; Future  - Comp Metabolic Panel; Future  - MICROALBUMIN CREAT RATIO URINE; Future  - dietary modification, exercise, weight loss  - Annual eye exam  - Regular foot exam  - Follow up with Endo as directed.     3. CKD stage 3 due to type 2 diabetes mellitus (HCC)  Chronic, stable, will monitor.   Discussed hydration and  avoidance of nephrotoxic drugs.      4. Morbid obesity with BMI of 40.0-44.9, adult (HCC)  Lost 22 lbs with diet and lifestyle changes.   Encouraged more weight loss.   Victoza helps    5. Dyslipidemia  - cont Lipitor 40 mg qd  - Lipid Profile; Future    6. Positive FIT (fecal immunochemical test)  7. Screening for colorectal cancer  - REFERRAL TO GI FOR COLONOSCOPY    8. Coronary artery disease involving native coronary artery of native heart without angina pectoris  Stable, f/u with cardiology     9. Need for vaccination  - Shingles Vaccine (SHINGRIX); Future  - Hepatitis B Vaccine Adult IM; Future       Follow-up: Return in about 6 months (around 10/22/2021) for Multiple issues.

## 2021-04-22 NOTE — PROGRESS NOTES
"Perez Leone is a 66 y.o. male here for a non-provider visit for:   HEPATITIS B 2 of 3  SHINGRIX (Shingles)    Reason for immunization: Overdue/Provider Recommended  Immunization records indicate need for vaccine: Yes, confirmed with Epic  Minimum interval has been met for this vaccine: Yes  ABN completed: Not Indicated    Order and dose verified by: [place]  VIS Dated   was given to patient: Yes  All IAC Questionnaire questions were answered \"No.\"    Patient tolerated injection and no adverse effects were observed or reported: Yes    Pt scheduled for next dose in series: No  "

## 2021-05-07 ENCOUNTER — OFFICE VISIT (OUTPATIENT)
Dept: ENDOCRINOLOGY | Facility: MEDICAL CENTER | Age: 66
End: 2021-05-07
Attending: INTERNAL MEDICINE
Payer: MEDICARE

## 2021-05-07 VITALS
HEIGHT: 72 IN | WEIGHT: 315 LBS | SYSTOLIC BLOOD PRESSURE: 124 MMHG | HEART RATE: 76 BPM | BODY MASS INDEX: 42.66 KG/M2 | DIASTOLIC BLOOD PRESSURE: 80 MMHG | OXYGEN SATURATION: 94 %

## 2021-05-07 DIAGNOSIS — E78.5 DYSLIPIDEMIA: ICD-10-CM

## 2021-05-07 DIAGNOSIS — N18.31 TYPE 2 DIABETES MELLITUS WITH STAGE 3A CHRONIC KIDNEY DISEASE, WITHOUT LONG-TERM CURRENT USE OF INSULIN (HCC): ICD-10-CM

## 2021-05-07 DIAGNOSIS — E11.22 TYPE 2 DIABETES MELLITUS WITH STAGE 3A CHRONIC KIDNEY DISEASE, WITHOUT LONG-TERM CURRENT USE OF INSULIN (HCC): ICD-10-CM

## 2021-05-07 DIAGNOSIS — I10 ESSENTIAL HYPERTENSION: ICD-10-CM

## 2021-05-07 DIAGNOSIS — Z79.84 LONG TERM (CURRENT) USE OF ORAL HYPOGLYCEMIC DRUGS: ICD-10-CM

## 2021-05-07 PROCEDURE — 99213 OFFICE O/P EST LOW 20 MIN: CPT | Performed by: INTERNAL MEDICINE

## 2021-05-07 PROCEDURE — 99214 OFFICE O/P EST MOD 30 MIN: CPT | Performed by: INTERNAL MEDICINE

## 2021-05-07 ASSESSMENT — FIBROSIS 4 INDEX: FIB4 SCORE: 1.1

## 2021-05-07 NOTE — PROGRESS NOTES
CHIEF COMPLAINT: Patient is here for follow up of Type 2 Diabetes Mellitus    HPI:     Perez Leone is a 65 y.o. male with Type 2 Diabetes Mellitus here for follow up.    Labs from April 12, 2021 show A1c is 7.9%  Labs from October 12, 2020 show A1c is 7%  Labs from 8/5/2020 HbA1c is well controlled at 6.8%    Patient is a morbidly obese gentleman with a history of coronary artery disease status post PCI by Dr. Sung, he has hypertension, chronic kidney disease stage III without microalbuminuria or proteinuria and hyperlipidemia.      On follow-up he is taking Synjardy 12.5/1000 mg twice a day, Actos 30 mg daily and Victoza 1.8 mg daily.      He reports nonadherence with his diet and exercise which explains his A1c elevation    We discussed changing his Victoza to Ozempic today but he declined and wants to wait which is reasonable    He remains on atorvastatin 80 mg daily for hyperlipidemia  He does have established coronary artery disease  He denies muscle aches and cramps  LDL cholesterol was well controlled at 69 with triglycerides 111 on October 2020    He does not have microalbuminuria on his labs from October 2020  Blood pressure remains well controlled on moexipril and metoprolol XL  GFR is stable at 52 with a serum creatinine of 1.41 on April 2021    His baseline TSH is normal          BG Diary  Patient did not bring glucose meter    Weight has been stable    Diabetes Complications   Retinopathy: No known retinopathy.  Last eye exam: October 2020 with Dr. Johnson  Neuropathy: Denies paresthesias or numbness in hands or feet. Denies any foot wounds.  Exercise: Minimal.  Diet: Fair.  Patient's medications, allergies, and social histories were reviewed and updated as appropriate.    ROS:     CONS:     No fever, no chills   EYES:     No diplopia, no blurry vision   CV:           No chest pain, no palpitations   PULM:     No SOB, no cough, no hemoptysis.   GI:            No nausea, no vomiting, no  diarrhea, no constipation   ENDO:     No polyuria, no polydipsia, no heat intolerance, no cold intolerance       Past Medical History:  Problem List:  2021-05: Long term (current) use of oral hypoglycemic drugs  2018-08: Presence of single chamber implantable cardioverter-  defibrillator (ICD)  2017-05: CKD stage 3 due to type 2 diabetes mellitus ([facility])  2017-05: Positive FIT (fecal immunochemical test)  2017-02: Encounter for long-term (current) use of insulin ([facility])  2017-02: Type 2 diabetes mellitus with stage 3 cronic kidney disease,   without long-term current use of insulin ([facility])_Dr. Macdonald  2016-07: Dyslipidemia  2016-07: Essential hypertension  2015-12: Uncontrolled type 2 diabetes mellitus with stage 3 chronic   kidney disease, without long-term current use of insulin ([facility])  2015-06: Heme positive stool  2014-01: DM (diabetes mellitus) ([facility])  2012-03: History of acute anterior wall myocardial infarction  2012-03: CAD s/p stent LAD in 2003_Dr. Sung  2012-03: DIABETES MELLITUS  2012-03: Morbid obesity with BMI of 40.0-44.9, adult ([facility])      Past Surgical History:  No past surgical history on file.     Allergies:  Patient has no known allergies.     Social History:  Social History     Tobacco Use   • Smoking status: Never Smoker   • Smokeless tobacco: Never Used   Substance Use Topics   • Alcohol use: No     Alcohol/week: 0.0 oz   • Drug use: No        Family History:   family history includes Diabetes in his brother; Heart Disease in his mother; Other in his father.      PHYSICAL EXAM:   OBJECTIVE:  Vital signs: /80   Pulse 76   Ht 1.829 m (6')   Wt (!) 146 kg (321 lb)   SpO2 94%   BMI 43.54 kg/m²   GENERAL: Severely obese gentleman in no apparent distress.   EYE:  No ocular asymmetry, PERRLA  HENT: Pink, moist mucous membranes.    NECK: No thyromegaly.   CARDIOVASCULAR:  No murmurs  LUNGS: Clear breath sounds  ABDOMEN: Soft, nontender   EXTREMITIES: No clubbing, cyanosis, or edema.    NEUROLOGICAL: No gross focal motor abnormalities   LYMPH: No cervical adenopathy palpated.   SKIN: No rashes, lesions.   Monofilament testing with a 10 gram force: sensation: intact bilaterally  Visual Inspection: Feet without maceration, ulcers, or fissures.  Pedal pulses: intact bilaterally    Labs:  Lab Results   Component Value Date/Time    HBA1C 7.9 (H) 04/12/2021 04:16 AM    HBA1C 6.8 (A) 08/05/2020 08:53 AM        Lab Results   Component Value Date/Time    WBC 8.8 04/12/2021 04:16 AM    WBC 8.4 01/13/2014 07:38 AM    WBC 8.7 10/19/2011 06:44 AM    RBC 4.29 04/12/2021 04:16 AM    RBC 4.46 (L) 01/13/2014 07:38 AM    RBC 4.36 10/19/2011 06:44 AM    HEMOGLOBIN 13.7 04/12/2021 04:16 AM    HEMOGLOBIN 13.9 (L) 01/13/2014 07:38 AM    MCV 97 04/12/2021 04:16 AM    MCV 95.2 01/13/2014 07:38 AM    MCV 95 10/19/2011 06:44 AM    MCH 31.9 04/12/2021 04:16 AM    MCH 31.2 01/13/2014 07:38 AM    MCH 32.3 10/19/2011 06:44 AM    MCHC 32.8 04/12/2021 04:16 AM    MCHC 32.8 01/13/2014 07:38 AM    RDW 13.5 04/12/2021 04:16 AM    RDW 13.7 01/13/2014 07:38 AM    RDW 13.9 10/19/2011 06:44 AM    MPV 10.5 (H) 01/13/2014 07:38 AM       Lab Results   Component Value Date/Time    SODIUM 138 04/12/2021 04:16 AM    SODIUM 135 01/13/2014 07:38 AM    POTASSIUM 4.7 04/12/2021 04:16 AM    POTASSIUM 4.3 01/13/2014 07:38 AM    CHLORIDE 107 (H) 04/12/2021 04:16 AM    CHLORIDE 103 01/13/2014 07:38 AM    CO2 19 (L) 04/12/2021 04:16 AM    CO2 24 01/13/2014 07:38 AM    ANION 8.0 01/13/2014 07:38 AM    GLUCOSE 150 (H) 04/12/2021 04:16 AM    GLUCOSE 305 (H) 01/13/2014 07:38 AM    BUN 19 04/12/2021 04:16 AM    BUN 17 01/13/2014 07:38 AM    CREATININE 1.41 (H) 04/12/2021 04:16 AM    CREATININE 1.12 01/13/2014 07:38 AM    CREATININE 0.98 10/19/2011 06:44 AM    CALCIUM 9.1 04/12/2021 04:16 AM    CALCIUM 9.5 01/13/2014 07:38 AM    ASTSGOT 14 04/12/2021 04:16 AM    ASTSGOT 13 01/13/2014 07:38 AM    ALTSGPT 16 04/12/2021 04:16 AM    ALTSGPT 17 01/13/2014  07:38 AM    TBILIRUBIN 0.6 04/12/2021 04:16 AM    TBILIRUBIN 0.9 01/13/2014 07:38 AM    ALBUMIN 3.9 04/12/2021 04:16 AM    ALBUMIN 4.1 01/13/2014 07:38 AM    TOTPROTEIN 6.4 04/12/2021 04:16 AM    TOTPROTEIN 6.8 01/13/2014 07:38 AM    GLOBULIN 2.5 04/12/2021 04:16 AM    GLOBULIN 2.7 01/13/2014 07:38 AM    AGRATIO 1.6 04/12/2021 04:16 AM    AGRATIO 1.5 01/13/2014 07:38 AM       Lab Results   Component Value Date/Time    CHOLSTRLTOT 129 11/11/2019 0452    TRIGLYCERIDE 97 11/11/2019 0452    HDL 40 11/11/2019 0452    LDL 70 11/11/2019 0452    CHOLHDLRAT 4.0 10/19/2011 0644       Lab Results   Component Value Date/Time    MICROALBCALC <3.2 06/26/2017 07:36 AM    MALBCRT 5 01/13/2014 07:38 AM    MICROALBUR 0.7 01/13/2014 07:38 AM    MICRALB 19.9 10/12/2020 05:32 AM        No results found for: TSHULTRASEN  No results found for: FREEDIR  No results found for: FREET3  No results found for: THYSTIMIG        ASSESSMENT/PLAN:     1. Controlled type 2 diabetes mellitus with stage 3 chronic kidney disease, without long-term current use of insulin (HCC)  Well-controlled  I recommended replacing Victoza with Ozempic but he wants to work on his diet and exercise which is reasonable  Continue max dose Victoza, Synjardy and Actos  I recommend low-carb diet  Foot exam was completed today  Recommend that he get an eye exam in October  We will see him again in 4 months with repeat of his A1c      2. Dyslipidemia  Well-controlled  Continue atorvastatin  Repeat fasting lipids with next labs    3. Essential hypertension  Well-controlled  Continue current medications  I am checking a urine microalbumin with his next labs    4. Long term (current) use of oral hypoglycemic drugs  Patient is on multiple oral agents for type 2 diabetes management      Return in about 4 months (around 9/7/2021).      Thank you kindly for allowing me to participate in the diabetes care plan for this patient.    Darius Macdonald MD, FACE, [facility]  08/05/20    CC:   Chely  CHRISTOS Vanessa M.D.

## 2021-05-07 NOTE — PROGRESS NOTES
RN-CDE Note    Subjective:   Endocrinology Clinic Progress Note  PCP: Chely Vanessa M.D.    HPI:  Perez Leone is a 66 y.o. old patient who is seen today for review of Type 2 Diabetes.    DM:   Last A1c:   Lab Results   Component Value Date/Time    HBA1C 7.9 (H) 04/12/2021 04:16 AM    HBA1C 6.8 (A) 08/05/2020 08:53 AM      Previous A1c was 7.0 on 10/12/20  A1C GOAL: < 7    Diabetes Medications:   Victoza 1.8 mg daily  Actos 30 mg daily  Synjardy 12.5-1000 mg BID    Taking above medications as prescribed: yes  Taking daily ASA: Yes    Exercise: Walking  Diet: trying to follow plate method of eating  Patient's body mass index is 43.54 kg/m². Exercise and nutrition counseling were performed at this visit.    Glucose monitoring frequency: Twice daily  Breakfast: 120-130's and Bed: 180's  Hypoglycemic episodes: no  Last Retinal Exam: on file and up-to-date  Daily Foot Exam: Yes   Foot Exam:  Monofilament: done  Monofilament testing with a 10 gram force: sensation intact: intact bilaterally  Visual Inspection: Feet with maceration, ulcers,  Fissures.  Feet with thick long toenails and cracked feet   Pedal pulses: decreased bilaterally   Lab Results   Component Value Date/Time    MICROALBCALC <3.2 06/26/2017 07:36 AM    MALBCRT 5 01/13/2014 07:38 AM    MICROALBUR 0.7 01/13/2014 07:38 AM    MICRALB 19.9 10/12/2020 05:32 AM      ACR Albumin/Creatinine Ratio goal <30   Currently Rx ACE/ARB: Yes   Dyslipidemia:  Lab Results   Component Value Date/Time    CHOLSTRLTOT 130 10/12/2020 05:32 AM    CHOLSTRLTOT 128 01/13/2014 07:38 AM    LDL 70 11/11/2019 04:52 AM    LDL 76 01/13/2014 07:38 AM    HDL 41 10/12/2020 05:32 AM    HDL 31 (A) 01/13/2014 07:38 AM    TRIGLYCERIDE 111 10/12/2020 05:32 AM    TRIGLYCERIDE 104 01/13/2014 07:38 AM       Currently Rx Statin: Yes     He  reports that he has never smoked. He has never used smokeless tobacco.      Plan:     Discussed and educated on:   - All medications, side effects and  compliance (discussed carefully)  - Annual eye examinations at Ophthalmology  - Home glucose monitoring emphasized  - Weight control and daily exercise    Recommended medication changes: No changes at this time.

## 2021-07-20 ENCOUNTER — HOSPITAL ENCOUNTER (OUTPATIENT)
Dept: HOSPITAL 8 - CVU | Age: 66
LOS: 1 days | Discharge: HOME | End: 2021-07-21
Attending: INTERNAL MEDICINE
Payer: MEDICARE

## 2021-07-20 DIAGNOSIS — I10: ICD-10-CM

## 2021-07-20 DIAGNOSIS — I21.09: ICD-10-CM

## 2021-07-20 DIAGNOSIS — I34.0: Primary | ICD-10-CM

## 2021-07-20 DIAGNOSIS — I25.5: ICD-10-CM

## 2021-07-20 DIAGNOSIS — N18.30 TYPE 2 DIABETES MELLITUS WITH STAGE 3 CHRONIC KIDNEY DISEASE, WITHOUT LONG-TERM CURRENT USE OF INSULIN (HCC): ICD-10-CM

## 2021-07-20 DIAGNOSIS — E11.22 TYPE 2 DIABETES MELLITUS WITH STAGE 3 CHRONIC KIDNEY DISEASE, WITHOUT LONG-TERM CURRENT USE OF INSULIN (HCC): ICD-10-CM

## 2021-07-20 PROCEDURE — A9502 TC99M TETROFOSMIN: HCPCS

## 2021-07-20 PROCEDURE — 93356 MYOCRD STRAIN IMG SPCKL TRCK: CPT

## 2021-07-20 PROCEDURE — 78452 HT MUSCLE IMAGE SPECT MULT: CPT

## 2021-07-20 PROCEDURE — 93306 TTE W/DOPPLER COMPLETE: CPT

## 2021-07-20 PROCEDURE — 93017 CV STRESS TEST TRACING ONLY: CPT

## 2021-07-22 RX ORDER — BLOOD-GLUCOSE METER
KIT MISCELLANEOUS
Qty: 200 STRIP | Refills: 5 | Status: SHIPPED | OUTPATIENT
Start: 2021-07-22 | End: 2022-09-06

## 2021-08-05 DIAGNOSIS — E11.22 CONTROLLED TYPE 2 DIABETES MELLITUS WITH STAGE 3 CHRONIC KIDNEY DISEASE, WITHOUT LONG-TERM CURRENT USE OF INSULIN (HCC): ICD-10-CM

## 2021-08-05 DIAGNOSIS — N18.30 CONTROLLED TYPE 2 DIABETES MELLITUS WITH STAGE 3 CHRONIC KIDNEY DISEASE, WITHOUT LONG-TERM CURRENT USE OF INSULIN (HCC): ICD-10-CM

## 2021-08-06 DIAGNOSIS — E11.22 CONTROLLED TYPE 2 DIABETES MELLITUS WITH STAGE 3 CHRONIC KIDNEY DISEASE, WITHOUT LONG-TERM CURRENT USE OF INSULIN (HCC): ICD-10-CM

## 2021-08-06 DIAGNOSIS — N18.30 CONTROLLED TYPE 2 DIABETES MELLITUS WITH STAGE 3 CHRONIC KIDNEY DISEASE, WITHOUT LONG-TERM CURRENT USE OF INSULIN (HCC): ICD-10-CM

## 2021-08-06 RX ORDER — EMPAGLIFLOZIN, METFORMIN HYDROCHLORIDE 12.5; 1 MG/1; MG/1
TABLET, EXTENDED RELEASE ORAL
Qty: 60 TABLET | Refills: 0 | Status: SHIPPED | OUTPATIENT
Start: 2021-08-06 | End: 2021-12-14

## 2021-08-20 DIAGNOSIS — E11.22 TYPE 2 DIABETES MELLITUS WITH STAGE 3A CHRONIC KIDNEY DISEASE, WITHOUT LONG-TERM CURRENT USE OF INSULIN (HCC): ICD-10-CM

## 2021-08-20 DIAGNOSIS — N18.31 TYPE 2 DIABETES MELLITUS WITH STAGE 3A CHRONIC KIDNEY DISEASE, WITHOUT LONG-TERM CURRENT USE OF INSULIN (HCC): ICD-10-CM

## 2021-08-20 RX ORDER — PIOGLITAZONEHYDROCHLORIDE 30 MG/1
TABLET ORAL
Qty: 40 TABLET | Refills: 0 | Status: SHIPPED | OUTPATIENT
Start: 2021-08-20 | End: 2021-09-27

## 2021-08-22 DIAGNOSIS — N18.30 CONTROLLED TYPE 2 DIABETES MELLITUS WITH STAGE 3 CHRONIC KIDNEY DISEASE, WITHOUT LONG-TERM CURRENT USE OF INSULIN (HCC): ICD-10-CM

## 2021-08-22 DIAGNOSIS — E11.22 CONTROLLED TYPE 2 DIABETES MELLITUS WITH STAGE 3 CHRONIC KIDNEY DISEASE, WITHOUT LONG-TERM CURRENT USE OF INSULIN (HCC): ICD-10-CM

## 2021-08-23 RX ORDER — LIRAGLUTIDE 6 MG/ML
INJECTION SUBCUTANEOUS
Qty: 9 ML | Refills: 0 | Status: SHIPPED | OUTPATIENT
Start: 2021-08-23 | End: 2021-10-04

## 2021-08-23 NOTE — TELEPHONE ENCOUNTER
Received request via: Pharmacy    Was the patient seen in the last year in this department? Yes    Does the patient have an active prescription (recently filled or refills available) for medication(s) requested? No       REQUESTED MEDICATION:   Requested Prescriptions     Pending Prescriptions Disp Refills   • VICTOZA 18 MG/3ML Solution Pen-injector [Pharmacy Med Name: Victoza 18 MG/3ML Subcutaneous Solution Pen-injector] 9 mL 0     Sig: INJECT 1.8 MG AS INSTRUCTED SUBCUTANEOUSLY ONCE DAILY

## 2021-09-14 ENCOUNTER — OFFICE VISIT (OUTPATIENT)
Dept: ENDOCRINOLOGY | Facility: MEDICAL CENTER | Age: 66
End: 2021-09-14
Attending: INTERNAL MEDICINE
Payer: MEDICARE

## 2021-09-14 VITALS
BODY MASS INDEX: 42.66 KG/M2 | DIASTOLIC BLOOD PRESSURE: 72 MMHG | SYSTOLIC BLOOD PRESSURE: 126 MMHG | HEIGHT: 72 IN | WEIGHT: 315 LBS | HEART RATE: 107 BPM | OXYGEN SATURATION: 95 %

## 2021-09-14 DIAGNOSIS — N18.30 CONTROLLED TYPE 2 DIABETES MELLITUS WITH STAGE 3 CHRONIC KIDNEY DISEASE, WITHOUT LONG-TERM CURRENT USE OF INSULIN (HCC): ICD-10-CM

## 2021-09-14 DIAGNOSIS — Z79.84 LONG TERM (CURRENT) USE OF ORAL HYPOGLYCEMIC DRUGS: ICD-10-CM

## 2021-09-14 DIAGNOSIS — I10 ESSENTIAL HYPERTENSION: ICD-10-CM

## 2021-09-14 DIAGNOSIS — E78.5 DYSLIPIDEMIA: ICD-10-CM

## 2021-09-14 DIAGNOSIS — E11.22 CONTROLLED TYPE 2 DIABETES MELLITUS WITH STAGE 3 CHRONIC KIDNEY DISEASE, WITHOUT LONG-TERM CURRENT USE OF INSULIN (HCC): ICD-10-CM

## 2021-09-14 DIAGNOSIS — N18.31 TYPE 2 DIABETES MELLITUS WITH STAGE 3A CHRONIC KIDNEY DISEASE, WITHOUT LONG-TERM CURRENT USE OF INSULIN (HCC): ICD-10-CM

## 2021-09-14 DIAGNOSIS — E11.22 TYPE 2 DIABETES MELLITUS WITH STAGE 3A CHRONIC KIDNEY DISEASE, WITHOUT LONG-TERM CURRENT USE OF INSULIN (HCC): ICD-10-CM

## 2021-09-14 LAB
HBA1C MFR BLD: 7.5 % (ref 0–5.6)
INT CON NEG: NEGATIVE
INT CON POS: POSITIVE

## 2021-09-14 PROCEDURE — 99212 OFFICE O/P EST SF 10 MIN: CPT | Performed by: INTERNAL MEDICINE

## 2021-09-14 PROCEDURE — 99214 OFFICE O/P EST MOD 30 MIN: CPT | Performed by: INTERNAL MEDICINE

## 2021-09-14 PROCEDURE — 83036 HEMOGLOBIN GLYCOSYLATED A1C: CPT | Performed by: INTERNAL MEDICINE

## 2021-09-14 RX ORDER — SACUBITRIL AND VALSARTAN 49; 51 MG/1; MG/1
1 TABLET, FILM COATED ORAL
COMMUNITY
Start: 2021-08-16 | End: 2021-09-14

## 2021-09-14 RX ORDER — SACUBITRIL AND VALSARTAN 97; 103 MG/1; MG/1
1 TABLET, FILM COATED ORAL 2 TIMES DAILY
COMMUNITY
Start: 2021-09-03

## 2021-09-14 ASSESSMENT — FIBROSIS 4 INDEX: FIB4 SCORE: 1.1

## 2021-09-14 NOTE — PROGRESS NOTES
CHIEF COMPLAINT: Patient is here for follow up of Type 2 Diabetes Mellitus    HPI:     Perez Leone is a 65 y.o. male with Type 2 Diabetes Mellitus here for follow up.    Labs from September 14, 2021 show A1c is 7.5%  Labs from April 12, 2021 show A1c was 7.9%  Labs from October 12, 2020 show A1c was 7%  Labs from 8/5/2020 show A1c was 6.8%    Patient is a morbidly obese gentleman with a history of coronary artery disease status post PCI by Dr. Sung, he has hypertension, chronic kidney disease stage III without microalbuminuria or proteinuria and hyperlipidemia.      On follow-up he is taking Synjardy 12.5/1000 mg twice a day, Actos 30 mg daily and Victoza 1.8 mg daily.      He reports improved adherence with his diet and exercise which explains his A1c elevation    We discussed changing his Victoza to Ozempic today again and he wants to try samples      He remains on atorvastatin 80 mg daily for hyperlipidemia  He does have established coronary artery disease  He denies muscle aches and cramps  LDL cholesterol was well controlled at 69 with triglycerides 111 on October 2020  He is overdue for a panel    He does not have microalbuminuria on his labs from October 2020  Blood pressure remains well controlled on moexipril and metoprolol XL  GFR is stable at 52 with a serum creatinine of 1.41 on April 2021  He is overdue for a urine test      His baseline TSH is normal          BG Diary  Patient did not bring glucose meter    Weight has been stable    Diabetes Complications   Retinopathy: No known retinopathy.  Last eye exam: October 2020 with Dr. Johnson  Neuropathy: Denies paresthesias or numbness in hands or feet. Denies any foot wounds.  Exercise: Minimal.  Diet: Fair.  Patient's medications, allergies, and social histories were reviewed and updated as appropriate.    ROS:     CONS:     No fever, no chills   EYES:     No diplopia, no blurry vision   CV:           No chest pain, no palpitations   PULM:     No  SOB, no cough, no hemoptysis.   GI:            No nausea, no vomiting, no diarrhea, no constipation   ENDO:     No polyuria, no polydipsia, no heat intolerance, no cold intolerance       Past Medical History:  Problem List:  2021-05: Long term (current) use of oral hypoglycemic drugs  2018-08: Presence of single chamber implantable cardioverter-  defibrillator (ICD)  2017-05: CKD stage 3 due to type 2 diabetes mellitus ([facility])  2017-05: Positive FIT (fecal immunochemical test)  2017-02: Encounter for long-term (current) use of insulin ([facility])  2017-02: Type 2 diabetes mellitus with stage 3 cronic kidney disease,   without long-term current use of insulin ([facility])_Dr. Macdonald  2016-07: Dyslipidemia  2016-07: Essential hypertension  2015-12: Uncontrolled type 2 diabetes mellitus with stage 3 chronic   kidney disease, without long-term current use of insulin ([facility])  2015-06: Heme positive stool  2014-01: DM (diabetes mellitus) ([facility])  2012-03: History of acute anterior wall myocardial infarction  2012-03: CAD s/p stent LAD in 2003_Dr. Sung  2012-03: DIABETES MELLITUS  2012-03: Morbid obesity with BMI of 40.0-44.9, adult ([facility])      Past Surgical History:  No past surgical history on file.     Allergies:  Patient has no known allergies.     Social History:  Social History     Tobacco Use   • Smoking status: Never Smoker   • Smokeless tobacco: Never Used   Vaping Use   • Vaping Use: Never used   Substance Use Topics   • Alcohol use: No     Alcohol/week: 0.0 oz   • Drug use: No        Family History:   family history includes Diabetes in his brother; Heart Disease in his mother; Other in his father.      PHYSICAL EXAM:   OBJECTIVE:  Vital signs: /72 (BP Location: Right arm, Patient Position: Sitting, BP Cuff Size: Large adult)   Pulse (!) 107   Ht 1.829 m (6')   Wt (!) 146 kg (321 lb)   SpO2 95%   BMI 43.54 kg/m²   GENERAL: Severely obese gentleman in no apparent distress.   EYE:  No ocular asymmetry, PERRLA  HENT:  Pink, moist mucous membranes.    NECK: No thyromegaly.   CARDIOVASCULAR:  No murmurs  LUNGS: Clear breath sounds  ABDOMEN: Soft, nontender   EXTREMITIES: No clubbing, cyanosis, or edema.   NEUROLOGICAL: No gross focal motor abnormalities   LYMPH: No cervical adenopathy palpated.   SKIN: No rashes, lesions.       Labs:  Lab Results   Component Value Date/Time    HBA1C 7.5 (A) 09/14/2021 09:07 AM        Lab Results   Component Value Date/Time    WBC 8.8 04/12/2021 04:16 AM    WBC 8.4 01/13/2014 07:38 AM    WBC 8.7 10/19/2011 06:44 AM    RBC 4.29 04/12/2021 04:16 AM    RBC 4.46 (L) 01/13/2014 07:38 AM    RBC 4.36 10/19/2011 06:44 AM    HEMOGLOBIN 13.7 04/12/2021 04:16 AM    HEMOGLOBIN 13.9 (L) 01/13/2014 07:38 AM    MCV 97 04/12/2021 04:16 AM    MCV 95.2 01/13/2014 07:38 AM    MCV 95 10/19/2011 06:44 AM    MCH 31.9 04/12/2021 04:16 AM    MCH 31.2 01/13/2014 07:38 AM    MCH 32.3 10/19/2011 06:44 AM    MCHC 32.8 04/12/2021 04:16 AM    MCHC 32.8 01/13/2014 07:38 AM    RDW 13.5 04/12/2021 04:16 AM    RDW 13.7 01/13/2014 07:38 AM    RDW 13.9 10/19/2011 06:44 AM    MPV 10.5 (H) 01/13/2014 07:38 AM       Lab Results   Component Value Date/Time    SODIUM 138 04/12/2021 04:16 AM    SODIUM 135 01/13/2014 07:38 AM    POTASSIUM 4.7 04/12/2021 04:16 AM    POTASSIUM 4.3 01/13/2014 07:38 AM    CHLORIDE 107 (H) 04/12/2021 04:16 AM    CHLORIDE 103 01/13/2014 07:38 AM    CO2 19 (L) 04/12/2021 04:16 AM    CO2 24 01/13/2014 07:38 AM    ANION 8.0 01/13/2014 07:38 AM    GLUCOSE 150 (H) 04/12/2021 04:16 AM    GLUCOSE 305 (H) 01/13/2014 07:38 AM    BUN 19 04/12/2021 04:16 AM    BUN 17 01/13/2014 07:38 AM    CREATININE 1.41 (H) 04/12/2021 04:16 AM    CREATININE 1.12 01/13/2014 07:38 AM    CREATININE 0.98 10/19/2011 06:44 AM    CALCIUM 9.1 04/12/2021 04:16 AM    CALCIUM 9.5 01/13/2014 07:38 AM    ASTSGOT 14 04/12/2021 04:16 AM    ASTSGOT 13 01/13/2014 07:38 AM    ALTSGPT 16 04/12/2021 04:16 AM    ALTSGPT 17 01/13/2014 07:38 AM    TBILIRUBIN 0.6  04/12/2021 04:16 AM    TBILIRUBIN 0.9 01/13/2014 07:38 AM    ALBUMIN 3.9 04/12/2021 04:16 AM    ALBUMIN 4.1 01/13/2014 07:38 AM    TOTPROTEIN 6.4 04/12/2021 04:16 AM    TOTPROTEIN 6.8 01/13/2014 07:38 AM    GLOBULIN 2.5 04/12/2021 04:16 AM    GLOBULIN 2.7 01/13/2014 07:38 AM    AGRATIO 1.6 04/12/2021 04:16 AM    AGRATIO 1.5 01/13/2014 07:38 AM       Lab Results   Component Value Date/Time    CHOLSTRLTOT 129 11/11/2019 0452    TRIGLYCERIDE 97 11/11/2019 0452    HDL 40 11/11/2019 0452    LDL 70 11/11/2019 0452    CHOLHDLRAT 4.0 10/19/2011 0644       Lab Results   Component Value Date/Time    MICROALBCALC <3.2 06/26/2017 07:36 AM    MALBCRT 5 01/13/2014 07:38 AM    MICROALBUR 0.7 01/13/2014 07:38 AM    MICRALB 19.9 10/12/2020 05:32 AM        No results found for: TSHULTRASEN  No results found for: FREEDIR  No results found for: FREET3  No results found for: THYSTIMIG        ASSESSMENT/PLAN:     1. Controlled type 2 diabetes mellitus with stage 3 chronic kidney disease, without long-term current use of insulin (HCC)  Well-controlled  I gave him samples of Ozempic to try  Continue Synjardy and Actos max dose  I recommend low-carb diet  Recommend that he get an eye exam in October  Follow-up in 3 months      2. Dyslipidemia  Well-controlled  Continue atorvastatin  Repeat fasting lipids this week    3. Essential hypertension  Well-controlled  Continue current medications  Repeat urine microalbumin this week    4. Long term (current) use of oral hypoglycemic drugs  Patient is on multiple oral agents for type 2 diabetes management      Return in about 3 months (around 12/14/2021).      Thank you kindly for allowing me to participate in the diabetes care plan for this patient.    Darius Macdonald MD, [facility], [facility]  08/05/20    CC:   Chely Vanessa M.D.

## 2021-09-27 DIAGNOSIS — E11.22 TYPE 2 DIABETES MELLITUS WITH STAGE 3A CHRONIC KIDNEY DISEASE, WITHOUT LONG-TERM CURRENT USE OF INSULIN (HCC): ICD-10-CM

## 2021-09-27 DIAGNOSIS — N18.31 TYPE 2 DIABETES MELLITUS WITH STAGE 3A CHRONIC KIDNEY DISEASE, WITHOUT LONG-TERM CURRENT USE OF INSULIN (HCC): ICD-10-CM

## 2021-09-27 RX ORDER — PIOGLITAZONEHYDROCHLORIDE 30 MG/1
TABLET ORAL
Qty: 40 TABLET | Refills: 0 | Status: SHIPPED | OUTPATIENT
Start: 2021-09-27 | End: 2021-11-08

## 2021-09-28 LAB
ALBUMIN SERPL-MCNC: 4.1 G/DL (ref 3.8–4.8)
ALBUMIN/CREAT UR: 23 MG/G CREAT (ref 0–29)
ALBUMIN/GLOB SERPL: 1.6 {RATIO} (ref 1.2–2.2)
ALP SERPL-CCNC: 96 IU/L (ref 44–121)
ALT SERPL-CCNC: 14 IU/L (ref 0–44)
AST SERPL-CCNC: 17 IU/L (ref 0–40)
BILIRUB SERPL-MCNC: 0.7 MG/DL (ref 0–1.2)
BUN SERPL-MCNC: 18 MG/DL (ref 8–27)
BUN/CREAT SERPL: 12 (ref 10–24)
CALCIUM SERPL-MCNC: 9.3 MG/DL (ref 8.6–10.2)
CHLORIDE SERPL-SCNC: 103 MMOL/L (ref 96–106)
CHOLEST SERPL-MCNC: 146 MG/DL (ref 100–199)
CO2 SERPL-SCNC: 17 MMOL/L (ref 20–29)
CREAT SERPL-MCNC: 1.49 MG/DL (ref 0.76–1.27)
CREAT UR-MCNC: 79.1 MG/DL
GLOBULIN SER CALC-MCNC: 2.6 G/DL (ref 1.5–4.5)
GLUCOSE SERPL-MCNC: 170 MG/DL (ref 65–99)
HDLC SERPL-MCNC: 43 MG/DL
LABORATORY COMMENT REPORT: NORMAL
LDLC SERPL CALC-MCNC: 83 MG/DL (ref 0–99)
MICROALBUMIN UR-MCNC: 18.2 UG/ML
POTASSIUM SERPL-SCNC: 4.9 MMOL/L (ref 3.5–5.2)
PROT SERPL-MCNC: 6.7 G/DL (ref 6–8.5)
SODIUM SERPL-SCNC: 139 MMOL/L (ref 134–144)
T4 FREE SERPL-MCNC: 1.35 NG/DL (ref 0.82–1.77)
TRIGL SERPL-MCNC: 109 MG/DL (ref 0–149)
TSH SERPL DL<=0.005 MIU/L-ACNC: 3.24 UIU/ML (ref 0.45–4.5)
VLDLC SERPL CALC-MCNC: 20 MG/DL (ref 5–40)

## 2021-10-03 DIAGNOSIS — N18.30 CONTROLLED TYPE 2 DIABETES MELLITUS WITH STAGE 3 CHRONIC KIDNEY DISEASE, WITHOUT LONG-TERM CURRENT USE OF INSULIN (HCC): ICD-10-CM

## 2021-10-03 DIAGNOSIS — E11.22 CONTROLLED TYPE 2 DIABETES MELLITUS WITH STAGE 3 CHRONIC KIDNEY DISEASE, WITHOUT LONG-TERM CURRENT USE OF INSULIN (HCC): ICD-10-CM

## 2021-10-04 RX ORDER — LIRAGLUTIDE 6 MG/ML
INJECTION SUBCUTANEOUS
Qty: 9 ML | Refills: 0 | Status: SHIPPED | OUTPATIENT
Start: 2021-10-04 | End: 2021-10-19

## 2021-10-19 ENCOUNTER — OFFICE VISIT (OUTPATIENT)
Dept: MEDICAL GROUP | Age: 66
End: 2021-10-19
Payer: MEDICARE

## 2021-10-19 VITALS
OXYGEN SATURATION: 95 % | WEIGHT: 315 LBS | HEIGHT: 72 IN | DIASTOLIC BLOOD PRESSURE: 66 MMHG | HEART RATE: 80 BPM | BODY MASS INDEX: 42.66 KG/M2 | SYSTOLIC BLOOD PRESSURE: 124 MMHG | TEMPERATURE: 97.4 F

## 2021-10-19 DIAGNOSIS — Z12.12 SCREENING FOR COLORECTAL CANCER: ICD-10-CM

## 2021-10-19 DIAGNOSIS — E78.5 DYSLIPIDEMIA: ICD-10-CM

## 2021-10-19 DIAGNOSIS — R60.0 UNILATERAL EDEMA OF LOWER EXTREMITY: ICD-10-CM

## 2021-10-19 DIAGNOSIS — R19.5 POSITIVE FIT (FECAL IMMUNOCHEMICAL TEST): ICD-10-CM

## 2021-10-19 DIAGNOSIS — E66.01 MORBID OBESITY WITH BMI OF 40.0-44.9, ADULT (HCC): ICD-10-CM

## 2021-10-19 DIAGNOSIS — E11.22 TYPE 2 DIABETES MELLITUS WITH STAGE 3A CHRONIC KIDNEY DISEASE, WITHOUT LONG-TERM CURRENT USE OF INSULIN (HCC): ICD-10-CM

## 2021-10-19 DIAGNOSIS — Z12.11 SCREENING FOR COLORECTAL CANCER: ICD-10-CM

## 2021-10-19 DIAGNOSIS — N18.31 TYPE 2 DIABETES MELLITUS WITH STAGE 3A CHRONIC KIDNEY DISEASE, WITHOUT LONG-TERM CURRENT USE OF INSULIN (HCC): ICD-10-CM

## 2021-10-19 DIAGNOSIS — I10 ESSENTIAL HYPERTENSION: ICD-10-CM

## 2021-10-19 PROBLEM — N18.30 CKD STAGE 3 DUE TO TYPE 2 DIABETES MELLITUS (HCC): Status: RESOLVED | Noted: 2017-05-08 | Resolved: 2021-10-19

## 2021-10-19 PROBLEM — E11.9 TYPE 2 DIABETES MELLITUS WITHOUT COMPLICATION, WITHOUT LONG-TERM CURRENT USE OF INSULIN (HCC): Status: ACTIVE | Noted: 2017-02-13

## 2021-10-19 PROCEDURE — 99214 OFFICE O/P EST MOD 30 MIN: CPT | Performed by: FAMILY MEDICINE

## 2021-10-19 RX ORDER — SEMAGLUTIDE 1.34 MG/ML
INJECTION, SOLUTION SUBCUTANEOUS
COMMUNITY
End: 2022-03-15

## 2021-10-19 ASSESSMENT — FIBROSIS 4 INDEX: FIB4 SCORE: 1.43

## 2021-10-19 NOTE — PATIENT INSTRUCTIONS
[facility]  [address] [place], [place] [address]KIKI boyd. [id number]  Phone: [phone]  Fax: [phone]

## 2021-10-19 NOTE — PROGRESS NOTES
Subjective:   CC: diabetes     HPI:     Perez Leone is a 66 y.o. male, established patient of the clinic.     Patient has chronic type 2 diabetes, hypertension, hyperlipidemia, morbid obesity. He is working with Dr. Macdonald for diabetes management. Most recent A1C was 7.5. he is compliant with medications. He tolerates them well, no side effects reported. His weight is stable. Negative visual changes, concerning lesions on his feet, symptoms of polyneuropathy.     Current medicines (including changes today)  Current Outpatient Medications   Medication Sig Dispense Refill   • Semaglutide, 1 MG/DOSE, (OZEMPIC, 1 MG/DOSE,) 2 MG/1.5ML Solution Pen-injector Inject  under the skin.     • pioglitazone (ACTOS) 30 MG Tab Take 1 tablet by mouth once daily 40 Tablet 0   • ENTRESTO  MG Tab tablet Take 1 Tablet by mouth 2 times a day.     • SYNJARDY XR 12.5-1000 MG TABLET SR 24 HR Take 1 tablet by mouth twice daily 60 tablet 0   • Empagliflozin-metFORMIN HCl ER 12.5-1000 MG TABLET SR 24 HR Take 1 tablet by mouth 2 times a day. 60 tablet 11   • glucose blood (FREESTYLE LITE) strip USE 1 STRIP TO CHECK GLUCOSE TWICE DAILY AND AS NEEDED FOR SYMPTOMS OF HIGH OR LOW BLOOD SUGAR 200 Strip 5   • FreeStyle Lancets Misc USE 1  TO CHECK GLUCOSE ONCE DAILY FOR  LOW  SUGAR  SYMPTOMS  (E11.22) 100 Each 1   • BD PEN NEEDLE ROSMERY 2ND GEN USE 1 NEEDLE TO INJECT INSULIN THREE TIMES DAILY WITH MEALS 100 Each 11   • nitroglycerin (NITROSTAT) 0.4 MG SL Tab DISSOLVE ONE TABLET UNDER THE TONGUE EVERY 5 MINUTES AS NEEDED FOR CHEST PAIN.  DO NOT EXCEED A TOTAL OF 3 DOSES IN 15 MINUTES 30 Tab 8   • Blood Glucose Monitoring Suppl Supplies Misc Test strips order: Test strips for Freestyle Lite meter. Sig: use twice a day and prn ssx high or low sugar 100 Each 3   • moexipril (UNIVASC) 7.5 MG Tab Take 1 Tab by mouth every day. 90 Tab 3   • atorvastatin (LIPITOR) 40 MG TABS Take 1 Tab by mouth every evening. 90 Each 1   • niacin SR (NIASPAN) 500  MG TBCR Take 1 Tab by mouth 3 times a day. 90 Each 6   • ST JOSEPH ASPIRIN 81 MG PO TBEC QAM.        No current facility-administered medications for this visit.     He  has a past medical history of Acute MI (HCC), Diabetes mellitus, Heme positive stool (6/8/2015), Hypertension, Overweight and obesity, and Pure hypercholesterolemia.    I personally reviewed patient's problem list, allergies, medications, family hx, social hx with patient and update EPIC.     REVIEW OF SYSTEMS:  CONSTITUTIONAL:  Denies night sweats, fatigue, malaise, lethargy, fever or chills.  RESPIRATORY:  Denies cough, wheeze, hemoptysis, or shortness of breath.  CARDIOVASCULAR:  Denies chest pains, palpitations, pedal edema     Objective:     /66 (BP Location: Right arm, Patient Position: Sitting, BP Cuff Size: Adult long)   Pulse 80   Temp 36.3 °C (97.4 °F) (Temporal)   Ht 1.829 m (6')   Wt (!) 147 kg (323 lb)   SpO2 95%  Body mass index is 43.81 kg/m².    Physical Exam:  Constitutional: awake, alert, in no distress.  Skin: Warm, dry, good turgor, no rashes, bruises, ulcers in visible areas.  Eye: conjunctiva clear, lids neg for edema or lesions.  Neck: Trachea midline, no masses, no thyromegaly. No cervical or supraclavicular lymphadenopathy  Respiratory: Unlabored respiratory effort, lungs clear to auscultation, no wheezes, no rales.  Cardiovascular: Normal S1, S2, no murmur, 2+ pedal edema and mild erythema of the right foot and ankle without pain.   Abdomen: Soft, non-tender to palpation, active BS, no hernia, no hepatosplenomegaly, negative rebound or guarding.   Psych: Oriented x3, affect and mood wnl, intact judgement and insight.     Monofilament testing with a 10 gram force: sensation intact: intact bilaterally  Visual Inspection: Feet without maceration, ulcers, fissures.  Pedal pulses: decreased on left     Assessment and Plan:   The following treatment plan was discussed    1. Type 2 diabetes mellitus with stage 3a  chronic kidney disease, without long-term current use of insulin (HCC)  Chronic, working with Dr. Macdonald, A1C 7.5 in 9/2021.   Follow up with Dr. Macdonald as directed.     2. Dyslipidemia  Chronic, controlled with Lipitor 40 mg qd , no s/e reported, will continue.      3. Essential hypertension  Chronic, controlled with Moexipril 7.5 mg qd and Entresto  mg qd, no s/e reported, will continue.      4. Morbid obesity with BMI of 40.0-44.9, adult (HCC)  - Patient identified as having weight management issue.  Appropriate orders and counseling given.     5. Unilateral edema of lower extremity  Exam was notable for unilateral pedal edema on the right without pain or history of trauma.   - US-EXTREMITY VENOUS LOWER UNILAT RIGHT; Future    6. Screening for colorectal cancer  7. Positive FIT (fecal immunochemical test)  - REFERRAL TO GI FOR COLONOSCOPY       Chely Vanessa M.D.      Followup: Return in about 6 months (around 4/19/2022) for Multiple issues.    Please note that this dictation was created using voice recognition software. I have made every reasonable attempt to correct obvious errors, but I expect that there are errors of grammar and possibly content that I did not discover before finalizing the note.

## 2021-10-26 DIAGNOSIS — E11.22 TYPE 2 DIABETES MELLITUS WITH STAGE 3 CHRONIC KIDNEY DISEASE, WITHOUT LONG-TERM CURRENT USE OF INSULIN (HCC): ICD-10-CM

## 2021-10-26 DIAGNOSIS — N18.30 TYPE 2 DIABETES MELLITUS WITH STAGE 3 CHRONIC KIDNEY DISEASE, WITHOUT LONG-TERM CURRENT USE OF INSULIN (HCC): ICD-10-CM

## 2021-10-29 RX ORDER — LANCETS 28 GAUGE
EACH MISCELLANEOUS
Qty: 100 EACH | Refills: 5 | Status: SHIPPED | OUTPATIENT
Start: 2021-10-29 | End: 2021-12-14

## 2021-11-07 DIAGNOSIS — E11.22 TYPE 2 DIABETES MELLITUS WITH STAGE 3A CHRONIC KIDNEY DISEASE, WITHOUT LONG-TERM CURRENT USE OF INSULIN (HCC): ICD-10-CM

## 2021-11-07 DIAGNOSIS — N18.31 TYPE 2 DIABETES MELLITUS WITH STAGE 3A CHRONIC KIDNEY DISEASE, WITHOUT LONG-TERM CURRENT USE OF INSULIN (HCC): ICD-10-CM

## 2021-11-08 RX ORDER — PIOGLITAZONEHYDROCHLORIDE 30 MG/1
TABLET ORAL
Qty: 40 TABLET | Refills: 0 | Status: SHIPPED | OUTPATIENT
Start: 2021-11-08 | End: 2021-12-20

## 2021-12-03 DIAGNOSIS — I25.2 HISTORY OF ACUTE ANTERIOR WALL MYOCARDIAL INFARCTION: ICD-10-CM

## 2021-12-03 DIAGNOSIS — I10 ESSENTIAL HYPERTENSION: ICD-10-CM

## 2021-12-08 RX ORDER — METOPROLOL SUCCINATE 100 MG/1
100 TABLET, EXTENDED RELEASE ORAL DAILY
Qty: 90 TABLET | Refills: 0 | OUTPATIENT
Start: 2021-12-08

## 2021-12-14 ENCOUNTER — NON-PROVIDER VISIT (OUTPATIENT)
Dept: ENDOCRINOLOGY | Facility: MEDICAL CENTER | Age: 66
End: 2021-12-14
Attending: INTERNAL MEDICINE
Payer: MEDICARE

## 2021-12-14 VITALS
HEIGHT: 72 IN | SYSTOLIC BLOOD PRESSURE: 124 MMHG | WEIGHT: 315 LBS | HEART RATE: 80 BPM | BODY MASS INDEX: 42.66 KG/M2 | DIASTOLIC BLOOD PRESSURE: 74 MMHG | OXYGEN SATURATION: 96 %

## 2021-12-14 DIAGNOSIS — N18.31 TYPE 2 DIABETES MELLITUS WITH STAGE 3A CHRONIC KIDNEY DISEASE, WITHOUT LONG-TERM CURRENT USE OF INSULIN (HCC): ICD-10-CM

## 2021-12-14 DIAGNOSIS — E11.22 TYPE 2 DIABETES MELLITUS WITH STAGE 3A CHRONIC KIDNEY DISEASE, WITHOUT LONG-TERM CURRENT USE OF INSULIN (HCC): ICD-10-CM

## 2021-12-14 LAB
HBA1C MFR BLD: 7.5 % (ref 0–5.6)
INT CON NEG: ABNORMAL
INT CON POS: ABNORMAL

## 2021-12-14 PROCEDURE — 99212 OFFICE O/P EST SF 10 MIN: CPT | Performed by: INTERNAL MEDICINE

## 2021-12-14 PROCEDURE — 83036 HEMOGLOBIN GLYCOSYLATED A1C: CPT

## 2021-12-14 RX ORDER — METOPROLOL SUCCINATE 100 MG/1
100 TABLET, EXTENDED RELEASE ORAL DAILY
Qty: 90 TABLET | Refills: 3 | Status: SHIPPED | OUTPATIENT
Start: 2021-12-14 | End: 2023-05-30

## 2021-12-14 ASSESSMENT — FIBROSIS 4 INDEX: FIB4 SCORE: 1.43

## 2021-12-14 NOTE — PROGRESS NOTES
RN-CDE Note    Subjective:   Endocrinology Clinic Progress Note  PCP: Chely Vanessa M.D.    HPI:  Perez Leone is a 66 y.o. old patient who is seen today for review of Type 2 Diabetes with stage 3 chronic kidney disease.  Recent changes in health: Health unchanged  DM:   Last A1c:   Lab Results   Component Value Date/Time    HBA1C 7.5 (A) 2021 11:35 AM      Previous A1c was 7.5 on 21  A1C GOAL: < 7    Diabetes Medications: Ozempic   Ozempic .5mg weekly  Synjardy XR 12.5-1000 mg BID  Actos 30 mg daily      Exercise: Walking as tolerated  Diet: meals per day on average: 3 meals and no snack  Patient's body mass index is 43.26 kg/m². Exercise and nutrition counseling were performed at this visit.    Glucose monitoring frequency: Twice dailhy  Fastin-140's and Bed: 160-180's  Hypoglycemic episodes: no  Last Retinal Exam: on file and up-to-date  Daily Foot Exam: Yes   Foot Exam:  Monofilament: done  Monofilament testing with a 10 gram force: sensation intact: intact bilaterally  Visual Inspection: Feet without maceration, ulcers, fissures.  Pedal pulses: intact bilaterally   Lab Results   Component Value Date/Time    MICROALBCALC <3.2 2017 07:36 AM    MALBCRT 5 2014 07:38 AM    MICROALBUR 0.7 2014 07:38 AM    MICRALB 18.2 2021 03:55 AM     He  reports that he has never smoked. He has never used smokeless tobacco.      Plan:     Discussed and educated on:   - All medications, side effects and compliance (discussed carefully)  - Annual eye examinations at Ophthalmology  - Home glucose monitoring emphasized  - Weight control and daily exercise    Recommended medication changes: Increase Ozempic to  .5 mg weekly for the next 2 months then increase to 1 mg weekly if tolerating.  Follow up with Dr. Macdonald on 3/15/22.

## 2021-12-20 DIAGNOSIS — E11.22 TYPE 2 DIABETES MELLITUS WITH STAGE 3A CHRONIC KIDNEY DISEASE, WITHOUT LONG-TERM CURRENT USE OF INSULIN (HCC): ICD-10-CM

## 2021-12-20 DIAGNOSIS — N18.31 TYPE 2 DIABETES MELLITUS WITH STAGE 3A CHRONIC KIDNEY DISEASE, WITHOUT LONG-TERM CURRENT USE OF INSULIN (HCC): ICD-10-CM

## 2021-12-20 RX ORDER — PIOGLITAZONEHYDROCHLORIDE 30 MG/1
TABLET ORAL
Qty: 40 TABLET | Refills: 0 | Status: SHIPPED | OUTPATIENT
Start: 2021-12-20 | End: 2022-01-28

## 2021-12-20 NOTE — TELEPHONE ENCOUNTER
Received request via: Pharmacy    Was the patient seen in the last year in this department? Yes    Does the patient have an active prescription (recently filled or refills available) for medication(s) requested? No     REQUESTED MEDICATION:   Requested Prescriptions     Pending Prescriptions Disp Refills   • pioglitazone (ACTOS) 30 MG Tab [Pharmacy Med Name: Pioglitazone HCl 30 MG Oral Tablet] 40 Tablet 0     Sig: Take 1 tablet by mouth once daily

## 2022-01-25 DIAGNOSIS — N18.31 TYPE 2 DIABETES MELLITUS WITH STAGE 3A CHRONIC KIDNEY DISEASE, WITHOUT LONG-TERM CURRENT USE OF INSULIN (HCC): ICD-10-CM

## 2022-01-25 DIAGNOSIS — E11.22 TYPE 2 DIABETES MELLITUS WITH STAGE 3A CHRONIC KIDNEY DISEASE, WITHOUT LONG-TERM CURRENT USE OF INSULIN (HCC): ICD-10-CM

## 2022-01-28 RX ORDER — PIOGLITAZONEHYDROCHLORIDE 30 MG/1
TABLET ORAL
Qty: 40 TABLET | Refills: 0 | Status: SHIPPED | OUTPATIENT
Start: 2022-01-28 | End: 2022-03-09

## 2022-03-08 DIAGNOSIS — E11.22 TYPE 2 DIABETES MELLITUS WITH STAGE 3A CHRONIC KIDNEY DISEASE, WITHOUT LONG-TERM CURRENT USE OF INSULIN (HCC): ICD-10-CM

## 2022-03-08 DIAGNOSIS — N18.31 TYPE 2 DIABETES MELLITUS WITH STAGE 3A CHRONIC KIDNEY DISEASE, WITHOUT LONG-TERM CURRENT USE OF INSULIN (HCC): ICD-10-CM

## 2022-03-09 RX ORDER — PIOGLITAZONEHYDROCHLORIDE 30 MG/1
TABLET ORAL
Qty: 40 TABLET | Refills: 0 | Status: SHIPPED | OUTPATIENT
Start: 2022-03-09 | End: 2022-03-15 | Stop reason: SDUPTHER

## 2022-03-15 ENCOUNTER — TELEPHONE (OUTPATIENT)
Dept: ENDOCRINOLOGY | Facility: MEDICAL CENTER | Age: 67
End: 2022-03-15
Payer: MEDICARE

## 2022-03-15 ENCOUNTER — OFFICE VISIT (OUTPATIENT)
Dept: ENDOCRINOLOGY | Facility: MEDICAL CENTER | Age: 67
End: 2022-03-15
Attending: INTERNAL MEDICINE
Payer: MEDICARE

## 2022-03-15 VITALS
SYSTOLIC BLOOD PRESSURE: 102 MMHG | BODY MASS INDEX: 42.66 KG/M2 | WEIGHT: 315 LBS | OXYGEN SATURATION: 93 % | HEART RATE: 103 BPM | DIASTOLIC BLOOD PRESSURE: 58 MMHG | HEIGHT: 72 IN

## 2022-03-15 DIAGNOSIS — Z79.84 LONG TERM (CURRENT) USE OF ORAL HYPOGLYCEMIC DRUGS: ICD-10-CM

## 2022-03-15 DIAGNOSIS — E11.22 TYPE 2 DIABETES MELLITUS WITH STAGE 3A CHRONIC KIDNEY DISEASE, WITHOUT LONG-TERM CURRENT USE OF INSULIN (HCC): ICD-10-CM

## 2022-03-15 DIAGNOSIS — N18.31 TYPE 2 DIABETES MELLITUS WITH STAGE 3A CHRONIC KIDNEY DISEASE, WITHOUT LONG-TERM CURRENT USE OF INSULIN (HCC): ICD-10-CM

## 2022-03-15 DIAGNOSIS — N18.30 CONTROLLED TYPE 2 DIABETES MELLITUS WITH STAGE 3 CHRONIC KIDNEY DISEASE, WITHOUT LONG-TERM CURRENT USE OF INSULIN (HCC): ICD-10-CM

## 2022-03-15 DIAGNOSIS — E78.5 DYSLIPIDEMIA: ICD-10-CM

## 2022-03-15 DIAGNOSIS — E11.22 CONTROLLED TYPE 2 DIABETES MELLITUS WITH STAGE 3 CHRONIC KIDNEY DISEASE, WITHOUT LONG-TERM CURRENT USE OF INSULIN (HCC): ICD-10-CM

## 2022-03-15 DIAGNOSIS — I10 ESSENTIAL HYPERTENSION: ICD-10-CM

## 2022-03-15 LAB
HBA1C MFR BLD: 6.7 % (ref 0–5.6)
INT CON NEG: ABNORMAL
INT CON POS: ABNORMAL

## 2022-03-15 PROCEDURE — 83036 HEMOGLOBIN GLYCOSYLATED A1C: CPT | Performed by: INTERNAL MEDICINE

## 2022-03-15 PROCEDURE — 99212 OFFICE O/P EST SF 10 MIN: CPT | Performed by: INTERNAL MEDICINE

## 2022-03-15 PROCEDURE — 99214 OFFICE O/P EST MOD 30 MIN: CPT | Performed by: INTERNAL MEDICINE

## 2022-03-15 RX ORDER — SEMAGLUTIDE 1.34 MG/ML
0.5 INJECTION, SOLUTION SUBCUTANEOUS
Qty: 1.5 ML | Refills: 11 | Status: SHIPPED | OUTPATIENT
Start: 2022-03-15 | End: 2023-03-29

## 2022-03-15 RX ORDER — PIOGLITAZONEHYDROCHLORIDE 30 MG/1
30 TABLET ORAL DAILY
Qty: 90 TABLET | Refills: 3 | Status: SHIPPED | OUTPATIENT
Start: 2022-03-15 | End: 2023-03-29

## 2022-03-15 ASSESSMENT — FIBROSIS 4 INDEX: FIB4 SCORE: 1.43

## 2022-03-15 ASSESSMENT — PATIENT HEALTH QUESTIONNAIRE - PHQ9: CLINICAL INTERPRETATION OF PHQ2 SCORE: 0

## 2022-03-15 NOTE — PROGRESS NOTES
CHIEF COMPLAINT: Patient is here for follow up of Type 2 Diabetes Mellitus    HPI:     Perez Leone is a 65 y.o. male with Type 2 Diabetes Mellitus here for follow up.    Labs from 3/15/2022 show a1c is 6.7%  Labs from September 14, 2021 show A1c was 7.5%  Labs from April 12, 2021 show A1c was 7.9%  Labs from October 12, 2020 show A1c was 7%  Labs from 8/5/2020 show A1c was 6.8%    Patient is a morbidly obese gentleman with a history of coronary artery disease status post PCI by Dr. Sung, he has hypertension, chronic kidney disease stage III without microalbuminuria or proteinuria and hyperlipidemia.  I switched him from Victoza to Ozempic         On follow-up he is taking Synjardy 12.5/1000 mg twice a day, Actos 30 mg daily and Ozempic 1.0mg weekly      He reports improved sugars  He denies SE with his Ozempic       He remains on atorvastatin 80 mg daily for hyperlipidemia  He does have established coronary artery disease  He denies muscle aches and cramps  LDL cholesterol was well controlled at 83 on 9/2021    He does not have microalbuminuria   Blood pressure is well controlled on moexipril and metoprolol XL  GFR is stable at 56 with a serum creatinine of 1.49 on 9/2021  His UACR was < 30 on 9/2021      His baseline TSH is normal at 3.2 on 9/2021          BG Diary  Patient did not bring glucose meter    Weight has been stable    Diabetes Complications   Retinopathy: No known retinopathy.  Last eye exam: 11/2021 with Dr. Johnson  Neuropathy: Denies paresthesias or numbness in hands or feet. Denies any foot wounds.  Exercise: Minimal.  Diet: Fair.  Patient's medications, allergies, and social histories were reviewed and updated as appropriate.    ROS:     CONS:     No fever, no chills   EYES:     No diplopia, no blurry vision   CV:           No chest pain, no palpitations   PULM:     No SOB, no cough, no hemoptysis.   GI:            No nausea, no vomiting, no diarrhea, no constipation   ENDO:     No  polyuria, no polydipsia, no heat intolerance, no cold intolerance       Past Medical History:  Problem List:  2021-05: Long term (current) use of oral hypoglycemic drugs  2018-08: Presence of single chamber implantable cardioverter-  defibrillator (ICD)  2017-05: CKD stage 3 due to type 2 diabetes mellitus ([facility])  2017-05: Positive FIT (fecal immunochemical test)  2017-02: Encounter for long-term (current) use of insulin ([facility])  2017-02: Controlled type 2 diabetes mellitus with stage 3 chronic   kidney disease, without long-term current use of insulin ([facility])  2016-07: Dyslipidemia  2016-07: Essential hypertension  2015-12: Uncontrolled type 2 diabetes mellitus with stage 3 chronic   kidney disease, without long-term current use of insulin ([facility])  2015-06: Heme positive stool  2014-01: DM (diabetes mellitus) ([facility])  2012-03: History of acute anterior wall myocardial infarction  2012-03: CAD s/p stent LAD in 2003_Dr. Sung  2012-03: DIABETES MELLITUS  2012-03: Morbid obesity with BMI of 40.0-44.9, adult ([facility])      Past Surgical History:  No past surgical history on file.     Allergies:  Patient has no known allergies.     Social History:  Social History     Tobacco Use   • Smoking status: Never Smoker   • Smokeless tobacco: Never Used   Vaping Use   • Vaping Use: Never used   Substance Use Topics   • Alcohol use: No     Alcohol/week: 0.0 oz   • Drug use: No        Family History:   family history includes Diabetes in his brother; Heart Disease in his mother; Other in his father.      PHYSICAL EXAM:   OBJECTIVE:  Vital signs: /58 (BP Location: Left arm, Patient Position: Sitting, BP Cuff Size: Large adult)   Pulse (!) 103   Ht 1.829 m (6')   Wt (!) 149 kg (329 lb)   SpO2 93%   BMI 44.62 kg/m²   GENERAL: Severely obese gentleman in no apparent distress.   EYE:  No ocular asymmetry, PERRLA  HENT: Pink, moist mucous membranes.    NECK: No thyromegaly.   CARDIOVASCULAR:  No murmurs  LUNGS: Clear breath  sounds  ABDOMEN: Soft, nontender   EXTREMITIES: No clubbing, cyanosis, or edema.   NEUROLOGICAL: No gross focal motor abnormalities   LYMPH: No cervical adenopathy palpated.   SKIN: No rashes, lesions.       Labs:  Lab Results   Component Value Date/Time    HBA1C 6.7 (A) 03/15/2022 11:01 AM        Lab Results   Component Value Date/Time    WBC 8.8 04/12/2021 04:16 AM    WBC 8.4 01/13/2014 07:38 AM    WBC 8.7 10/19/2011 06:44 AM    RBC 4.29 04/12/2021 04:16 AM    RBC 4.46 (L) 01/13/2014 07:38 AM    RBC 4.36 10/19/2011 06:44 AM    HEMOGLOBIN 13.7 04/12/2021 04:16 AM    HEMOGLOBIN 13.9 (L) 01/13/2014 07:38 AM    MCV 97 04/12/2021 04:16 AM    MCV 95.2 01/13/2014 07:38 AM    MCV 95 10/19/2011 06:44 AM    MCH 31.9 04/12/2021 04:16 AM    MCH 31.2 01/13/2014 07:38 AM    MCH 32.3 10/19/2011 06:44 AM    MCHC 32.8 04/12/2021 04:16 AM    MCHC 32.8 01/13/2014 07:38 AM    RDW 13.5 04/12/2021 04:16 AM    RDW 13.7 01/13/2014 07:38 AM    RDW 13.9 10/19/2011 06:44 AM    MPV 10.5 (H) 01/13/2014 07:38 AM       Lab Results   Component Value Date/Time    SODIUM 139 09/27/2021 03:55 AM    SODIUM 135 01/13/2014 07:38 AM    POTASSIUM 4.9 09/27/2021 03:55 AM    POTASSIUM 4.3 01/13/2014 07:38 AM    CHLORIDE 103 09/27/2021 03:55 AM    CHLORIDE 103 01/13/2014 07:38 AM    CO2 17 (L) 09/27/2021 03:55 AM    CO2 24 01/13/2014 07:38 AM    ANION 8.0 01/13/2014 07:38 AM    GLUCOSE 170 (H) 09/27/2021 03:55 AM    GLUCOSE 305 (H) 01/13/2014 07:38 AM    BUN 18 09/27/2021 03:55 AM    BUN 17 01/13/2014 07:38 AM    CREATININE 1.49 (H) 09/27/2021 03:55 AM    CREATININE 1.12 01/13/2014 07:38 AM    CREATININE 0.98 10/19/2011 06:44 AM    CALCIUM 9.3 09/27/2021 03:55 AM    CALCIUM 9.5 01/13/2014 07:38 AM    ASTSGOT 17 09/27/2021 03:55 AM    ASTSGOT 13 01/13/2014 07:38 AM    ALTSGPT 14 09/27/2021 03:55 AM    ALTSGPT 17 01/13/2014 07:38 AM    TBILIRUBIN 0.7 09/27/2021 03:55 AM    TBILIRUBIN 0.9 01/13/2014 07:38 AM    ALBUMIN 4.1 09/27/2021 03:55 AM    ALBUMIN 4.1  01/13/2014 07:38 AM    TOTPROTEIN 6.7 09/27/2021 03:55 AM    TOTPROTEIN 6.8 01/13/2014 07:38 AM    GLOBULIN 2.6 09/27/2021 03:55 AM    GLOBULIN 2.7 01/13/2014 07:38 AM    AGRATIO 1.6 09/27/2021 03:55 AM    AGRATIO 1.5 01/13/2014 07:38 AM       Lab Results   Component Value Date/Time    CHOLSTRLTOT 129 11/11/2019 0452    TRIGLYCERIDE 97 11/11/2019 0452    HDL 40 11/11/2019 0452    LDL 70 11/11/2019 0452    CHOLHDLRAT 4.0 10/19/2011 0644       Lab Results   Component Value Date/Time    MICROALBCALC <3.2 06/26/2017 07:36 AM    MALBCRT 5 01/13/2014 07:38 AM    MICROALBUR 0.7 01/13/2014 07:38 AM    MICRALB 18.2 09/27/2021 03:55 AM        No results found for: TSHULTRASEN  No results found for: FREEDIR  No results found for: FREET3  No results found for: THYSTIMIG        ASSESSMENT/PLAN:     1. Controlled type 2 diabetes mellitus with stage 3 chronic kidney disease, without long-term current use of insulin (HCC)  Well-controlled  Continue Ozempic 0.5mg weekly  Continue Synjardy and Actos max dose  I introduced him to our pharmacy coordinator so he can enroll in patient assistance for Ozempic and Synjardy  I did give him 1 sample of Ozempic  I recommend that he get updated labs for his serum crea, lipids and urine albumin this week   Foot exam was completed today  Follow-up with Meghan in 6 months      2. Dyslipidemia  Well-controlled  Continue atorvastatin  Repeat fasting lipids this week    3. Essential hypertension  Well-controlled  Continue current medications  Repeat urine microalbumin this week    4. Long term (current) use of oral hypoglycemic drugs  Patient is on multiple oral agents for type 2 diabetes management      Return in about 6 months (around 9/15/2022).      Thank you kindly for allowing me to participate in the diabetes care plan for this patient.    Darius Macdonald MD, [facility], [facility]  08/05/20    CC:   Chely Vanessa M.D.

## 2022-03-22 LAB
ALBUMIN SERPL-MCNC: 4.1 G/DL (ref 3.8–4.8)
ALBUMIN/CREAT UR: 30 MG/G CREAT (ref 0–29)
ALBUMIN/GLOB SERPL: 1.9 {RATIO} (ref 1.2–2.2)
ALP SERPL-CCNC: 88 IU/L (ref 44–121)
ALT SERPL-CCNC: 12 IU/L (ref 0–44)
AST SERPL-CCNC: 15 IU/L (ref 0–40)
BASOPHILS # BLD AUTO: 0.1 X10E3/UL (ref 0–0.2)
BASOPHILS NFR BLD AUTO: 1 %
BILIRUB SERPL-MCNC: 0.7 MG/DL (ref 0–1.2)
BUN SERPL-MCNC: 18 MG/DL (ref 8–27)
BUN/CREAT SERPL: 12 (ref 10–24)
CALCIUM SERPL-MCNC: 9.5 MG/DL (ref 8.6–10.2)
CHLORIDE SERPL-SCNC: 106 MMOL/L (ref 96–106)
CHOLEST SERPL-MCNC: 124 MG/DL (ref 100–199)
CO2 SERPL-SCNC: 20 MMOL/L (ref 20–29)
CREAT SERPL-MCNC: 1.52 MG/DL (ref 0.76–1.27)
CREAT UR-MCNC: 100.9 MG/DL
EGFRCR SERPLBLD CKD-EPI 2021: 50 ML/MIN/1.73
EOSINOPHIL # BLD AUTO: 0.5 X10E3/UL (ref 0–0.4)
EOSINOPHIL NFR BLD AUTO: 8 %
ERYTHROCYTE [DISTWIDTH] IN BLOOD BY AUTOMATED COUNT: 13.1 % (ref 11.6–15.4)
GLOBULIN SER CALC-MCNC: 2.2 G/DL (ref 1.5–4.5)
GLUCOSE SERPL-MCNC: 123 MG/DL (ref 65–99)
HCT VFR BLD AUTO: 41.1 % (ref 37.5–51)
HDLC SERPL-MCNC: 45 MG/DL
HGB BLD-MCNC: 13.7 G/DL (ref 13–17.7)
IMM GRANULOCYTES # BLD AUTO: 0 X10E3/UL (ref 0–0.1)
IMM GRANULOCYTES NFR BLD AUTO: 0 %
IMMATURE CELLS  115398: ABNORMAL
LABORATORY COMMENT REPORT: NORMAL
LDLC SERPL CALC-MCNC: 60 MG/DL (ref 0–99)
LYMPHOCYTES # BLD AUTO: 1.5 X10E3/UL (ref 0.7–3.1)
LYMPHOCYTES NFR BLD AUTO: 26 %
MCH RBC QN AUTO: 32.7 PG (ref 26.6–33)
MCHC RBC AUTO-ENTMCNC: 33.3 G/DL (ref 31.5–35.7)
MCV RBC AUTO: 98 FL (ref 79–97)
MICROALBUMIN UR-MCNC: 30.7 UG/ML
MONOCYTES # BLD AUTO: 0.7 X10E3/UL (ref 0.1–0.9)
MONOCYTES NFR BLD AUTO: 12 %
MORPHOLOGY BLD-IMP: ABNORMAL
NEUTROPHILS # BLD AUTO: 3.2 X10E3/UL (ref 1.4–7)
NEUTROPHILS NFR BLD AUTO: 53 %
NRBC BLD AUTO-RTO: ABNORMAL %
PLATELET # BLD AUTO: 183 X10E3/UL (ref 150–450)
POTASSIUM SERPL-SCNC: 4.9 MMOL/L (ref 3.5–5.2)
PROT SERPL-MCNC: 6.3 G/DL (ref 6–8.5)
RBC # BLD AUTO: 4.19 X10E6/UL (ref 4.14–5.8)
SODIUM SERPL-SCNC: 139 MMOL/L (ref 134–144)
TRIGL SERPL-MCNC: 104 MG/DL (ref 0–149)
TSH SERPL DL<=0.005 MIU/L-ACNC: 2.73 UIU/ML (ref 0.45–4.5)
VLDLC SERPL CALC-MCNC: 19 MG/DL (ref 5–40)
WBC # BLD AUTO: 6 X10E3/UL (ref 3.4–10.8)

## 2022-05-09 ENCOUNTER — OFFICE VISIT (OUTPATIENT)
Dept: MEDICAL GROUP | Age: 67
End: 2022-05-09
Payer: MEDICARE

## 2022-05-09 VITALS
BODY MASS INDEX: 42.66 KG/M2 | RESPIRATION RATE: 18 BRPM | TEMPERATURE: 98 F | OXYGEN SATURATION: 92 % | HEIGHT: 72 IN | SYSTOLIC BLOOD PRESSURE: 104 MMHG | HEART RATE: 108 BPM | WEIGHT: 315 LBS | DIASTOLIC BLOOD PRESSURE: 58 MMHG

## 2022-05-09 DIAGNOSIS — Z12.12 SCREENING FOR COLORECTAL CANCER: ICD-10-CM

## 2022-05-09 DIAGNOSIS — E11.22 CONTROLLED TYPE 2 DIABETES MELLITUS WITH STAGE 3 CHRONIC KIDNEY DISEASE, WITHOUT LONG-TERM CURRENT USE OF INSULIN (HCC): ICD-10-CM

## 2022-05-09 DIAGNOSIS — I25.2 HISTORY OF ACUTE ANTERIOR WALL MYOCARDIAL INFARCTION: ICD-10-CM

## 2022-05-09 DIAGNOSIS — Z23 NEED FOR VACCINATION: ICD-10-CM

## 2022-05-09 DIAGNOSIS — N18.30 CONTROLLED TYPE 2 DIABETES MELLITUS WITH STAGE 3 CHRONIC KIDNEY DISEASE, WITHOUT LONG-TERM CURRENT USE OF INSULIN (HCC): ICD-10-CM

## 2022-05-09 DIAGNOSIS — E66.01 MORBID OBESITY WITH BMI OF 40.0-44.9, ADULT (HCC): ICD-10-CM

## 2022-05-09 DIAGNOSIS — Z12.11 SCREENING FOR COLORECTAL CANCER: ICD-10-CM

## 2022-05-09 DIAGNOSIS — I10 ESSENTIAL HYPERTENSION: ICD-10-CM

## 2022-05-09 DIAGNOSIS — Z95.810 PRESENCE OF SINGLE CHAMBER IMPLANTABLE CARDIOVERTER-DEFIBRILLATOR (ICD): ICD-10-CM

## 2022-05-09 DIAGNOSIS — Z79.84 LONG TERM (CURRENT) USE OF ORAL HYPOGLYCEMIC DRUGS: ICD-10-CM

## 2022-05-09 DIAGNOSIS — I25.10 CORONARY ARTERY DISEASE INVOLVING NATIVE CORONARY ARTERY OF NATIVE HEART WITHOUT ANGINA PECTORIS: ICD-10-CM

## 2022-05-09 DIAGNOSIS — E11.42 DIABETIC POLYNEUROPATHY ASSOCIATED WITH TYPE 2 DIABETES MELLITUS (HCC): ICD-10-CM

## 2022-05-09 DIAGNOSIS — E78.5 DYSLIPIDEMIA: ICD-10-CM

## 2022-05-09 PROCEDURE — 99214 OFFICE O/P EST MOD 30 MIN: CPT | Mod: 25 | Performed by: FAMILY MEDICINE

## 2022-05-09 PROCEDURE — 90471 IMMUNIZATION ADMIN: CPT | Performed by: FAMILY MEDICINE

## 2022-05-09 PROCEDURE — 90677 PCV20 VACCINE IM: CPT | Performed by: FAMILY MEDICINE

## 2022-05-09 ASSESSMENT — FIBROSIS 4 INDEX: FIB4 SCORE: 1.59

## 2022-05-09 NOTE — PROGRESS NOTES
Subjective:   CC: hypertension follow up     HPI:     Perez Leone is a 67 y.o. male, established patient of the clinic.     Patient has chronic controlled type 2 diabetes, hypertension, hyperlipidemia, CAD, morbid obesity.  He is taking all medication as directed.  He tolerates them well, no side effect reported.  Negative visual changes, concerning lesion on his feet.  He does have very mild numbness and tingling at bilateral feet that is not too bothersome.  He continues to follow-up regularly with endocrinology and cardiology.  All chronic medical conditions are controlled with medication.    Current medicines (including changes today)  Current Outpatient Medications   Medication Sig Dispense Refill   • Semaglutide,0.25 or 0.5MG/DOS, (OZEMPIC, 0.25 OR 0.5 MG/DOSE,) 2 MG/1.5ML Solution Pen-injector Inject 0.5 mg under the skin every 7 days. 1 pen per month 1.5 mL 11   • pioglitazone (ACTOS) 30 MG Tab Take 1 Tablet by mouth every day. 90 Tablet 3   • Empagliflozin-metFORMIN HCl ER 12.5-1000 MG TABLET SR 24 HR Take 1 Tablet by mouth 2 times a day. 60 Tablet 11   • metoprolol SR (TOPROL XL) 100 MG TABLET SR 24 HR Take 1 Tablet by mouth every day. 90 Tablet 3   • ENTRESTO  MG Tab tablet Take 1 Tablet by mouth 2 times a day.     • glucose blood (FREESTYLE LITE) strip USE 1 STRIP TO CHECK GLUCOSE TWICE DAILY AND AS NEEDED FOR SYMPTOMS OF HIGH OR LOW BLOOD SUGAR 200 Strip 5   • BD PEN NEEDLE ROSMERY 2ND GEN USE 1 NEEDLE TO INJECT INSULIN THREE TIMES DAILY WITH MEALS 100 Each 11   • Blood Glucose Monitoring Suppl Supplies Misc Test strips order: Test strips for Freestyle Lite meter. Sig: use twice a day and prn ssx high or low sugar 100 Each 3   • moexipril (UNIVASC) 7.5 MG Tab Take 1 Tab by mouth every day. 90 Tab 3   • atorvastatin (LIPITOR) 40 MG TABS Take 1 Tab by mouth every evening. 90 Each 1   • niacin SR (NIASPAN) 500 MG TBCR Take 1 Tab by mouth 3 times a day. 90 Each 6   • ST LAURA ASPIRIN 81 MG PO TBEC  QAJEAN.        No current facility-administered medications for this visit.     He  has a past medical history of Acute MI (HCC), Diabetes mellitus, Heme positive stool (6/8/2015), Hypertension, Overweight and obesity, and Pure hypercholesterolemia.    I reviewed patient's problem list, allergies, medications, family hx, social hx with patient and update EPIC.        Objective:     /58 (BP Location: Left arm, Patient Position: Sitting, BP Cuff Size: Large adult)   Pulse (!) 108   Temp 36.7 °C (98 °F) (Temporal)   Resp 18   Ht 1.829 m (6')   Wt (!) 147 kg (325 lb)   SpO2 92%  Body mass index is 44.08 kg/m².    Physical Exam:  Constitutional: awake, alert, in no distress.  Skin: Warm, dry, good turgor, no rashes, bruises, ulcers in visible areas.  Eye: conjunctiva clear, lids neg for edema or lesions.  ENMT: TM and auditory canals wnl.    Neck: Trachea midline, no masses, no thyromegaly. No cervical or supraclavicular lymphadenopathy  Respiratory: Unlabored respiratory effort, lungs clear to auscultation, no wheezes, no rales.  Cardiovascular: Normal S1, S2, no murmur, no pedal edema.  Psych: Oriented x3, affect and mood wnl, intact judgement and insight.       Assessment and Plan:   The following treatment plan was discussed    1. Controlled type 2 diabetes mellitus with stage 3 chronic kidney disease, without long-term current use of insulin ([facility])  2. Long term (current) use of oral hypoglycemic drugs  Chronic, controlled, most recent A1c was 6.7.  This condition is currently being managed by endocrinology.  Patient is taking empagliflozin-metformin 12.5-1000 mg twice daily.  He also take pioglitazone 30 mg daily and Ozempic 0.5 mg every 7 days.  -Continue all medications at current doses and follow-up with endocrinology as directed.  - dietary modification, exercise, weight loss  - Annual eye exam  - Regular foot exam  - Discussed prevention and management of hypoglycemia  - Side effects of all medications  discussed with patient  - Follow up in 6 months.       3. Presence of single chamber implantable cardioverter-defibrillator (ICD)  4. Coronary artery disease involving native coronary artery of native heart without angina pectoris  5. History of acute anterior wall myocardial infarction  6. Essential hypertension  7. Dyslipidemia  Stable, controlled with pharmacotherapy.  Patient has consistent follow-up with Dr. Sung every 6 months.  -Continue Entresto  mg twice daily, metoprolol SR 20 mg daily, moexipril 7.5 mg daily, niacin SR 5 mg 3 times daily, aspirin 81 mg daily, and Lipitor 40 mg daily.  -Follow-up with cardiology as directed    8. Morbid obesity with BMI of 40.0-44.9, adult (HCC)  - Patient identified as having weight management issue.  Appropriate orders and counseling given.     9. Screening for colorectal cancer  - COLOGUARD (FIT DNA)    10. Need for vaccination  - Pneumococcal Conjugate Vaccine 20-Valent (19 yrs+)    11. Diabetic polyneuropathy associated with type 2 diabetes mellitus (HCC)  Mild, affecting bilateral feet, not bothersome.  Patient declined treatment.      Chely Vanessa M.D.      Followup: Return in about 6 months (around 11/9/2022) for Annual wellness visit.    Please note that this dictation was created using voice recognition software. I have made every reasonable attempt to correct obvious errors, but I expect that there are errors of grammar and possibly content that I did not discover before finalizing the note.

## 2022-05-24 DIAGNOSIS — Z12.11 COLON CANCER SCREENING: ICD-10-CM

## 2022-08-29 ENCOUNTER — NON-PROVIDER VISIT (OUTPATIENT)
Dept: ENDOCRINOLOGY | Facility: MEDICAL CENTER | Age: 67
End: 2022-08-29
Attending: INTERNAL MEDICINE
Payer: MEDICARE

## 2022-08-29 VITALS
SYSTOLIC BLOOD PRESSURE: 118 MMHG | BODY MASS INDEX: 42.66 KG/M2 | DIASTOLIC BLOOD PRESSURE: 70 MMHG | HEIGHT: 72 IN | HEART RATE: 72 BPM | OXYGEN SATURATION: 95 % | WEIGHT: 315 LBS

## 2022-08-29 DIAGNOSIS — E11.22 CONTROLLED TYPE 2 DIABETES MELLITUS WITH STAGE 3 CHRONIC KIDNEY DISEASE, WITHOUT LONG-TERM CURRENT USE OF INSULIN (HCC): ICD-10-CM

## 2022-08-29 DIAGNOSIS — I25.10 CAD (CORONARY ARTERY DISEASE): ICD-10-CM

## 2022-08-29 DIAGNOSIS — N18.30 CONTROLLED TYPE 2 DIABETES MELLITUS WITH STAGE 3 CHRONIC KIDNEY DISEASE, WITHOUT LONG-TERM CURRENT USE OF INSULIN (HCC): ICD-10-CM

## 2022-08-29 LAB
HBA1C MFR BLD: 6.3 % (ref 0–5.6)
INT CON NEG: ABNORMAL
INT CON POS: ABNORMAL

## 2022-08-29 PROCEDURE — 99212 OFFICE O/P EST SF 10 MIN: CPT | Performed by: INTERNAL MEDICINE

## 2022-08-29 PROCEDURE — 83036 HEMOGLOBIN GLYCOSYLATED A1C: CPT

## 2022-08-29 RX ORDER — NIACIN 500 MG/1
500 TABLET, EXTENDED RELEASE ORAL 3 TIMES DAILY
Qty: 90 TABLET | Refills: 6 | Status: SHIPPED | OUTPATIENT
Start: 2022-08-29

## 2022-08-29 RX ORDER — ATORVASTATIN CALCIUM 40 MG/1
40 TABLET, FILM COATED ORAL EVERY EVENING
Qty: 90 TABLET | Refills: 1 | Status: SHIPPED | OUTPATIENT
Start: 2022-08-29

## 2022-08-29 ASSESSMENT — FIBROSIS 4 INDEX: FIB4 SCORE: 1.59

## 2022-08-29 NOTE — PROGRESS NOTES
RN-CDE Note    Subjective:   Endocrinology Clinic Progress Note  PCP: Chely Vanessa M.D.    HPI:  Perez Leone is a 67 y.o. old patient who is seen today for review of Type 2 Diabetes.  Recent changes in health: Health unchanged.  He is losing weight.  DM:   Last A1c:   Lab Results   Component Value Date/Time    HBA1C 6.3 (A) 08/29/2022 11:41 AM      Previous A1c was 6.7 on 3/15/22  A1C GOAL: < 7    Diabetes Medications:   Ozempic .5 mg weekly  Synjardy 12.5-1000 mg BID  Actos 30 mg daily    Exercise: Walking and bowling  Diet: Cereal for breakfast, sandwich for lunch, T.V. dinner for dinner  Patient's body mass index is 42.99 kg/m². Exercise and nutrition counseling were performed at this visit.    Glucose monitoring frequency: Twice daily  117-170  Hypoglycemic episodes: no  Last Retinal Exam: on file and up-to-date  Daily Foot Exam: Yes   Foot Exam:  Monofilament: done  Monofilament testing with a 10 gram force: sensation: decreased bilaterally  Visual Inspection: Feet without maceration, ulcers, or fissures.  Feet dry and cracked and mild edema.  Pedal pulses: intact bilaterally   Lab Results   Component Value Date/Time    MICROALBCALC <3.2 06/26/2017 07:36 AM    MALBCRT 5 01/13/2014 07:38 AM    MICROALBUR 0.7 01/13/2014 07:38 AM    MICRALB 30.7 03/21/2022 04:52 AM        ACR Albumin/Creatinine Ratio goal <30     HTN:   Blood pressure goal <140/<80 .   Currently Rx ACE/ARB: Yes     Dyslipidemia:    Lab Results   Component Value Date/Time    CHOLSTRLTOT 124 03/21/2022 04:52 AM    CHOLSTRLTOT 128 01/13/2014 07:38 AM    LDL 70 11/11/2019 04:52 AM    LDL 76 01/13/2014 07:38 AM    HDL 45 03/21/2022 04:52 AM    HDL 31 (A) 01/13/2014 07:38 AM    TRIGLYCERIDE 104 03/21/2022 04:52 AM    TRIGLYCERIDE 104 01/13/2014 07:38 AM         Currently Rx Statin: Yes     He  reports that he has never smoked. He has never used smokeless tobacco.      Plan:     Discussed and educated on:   - All medications, side effects and  compliance (discussed carefully)  - Annual eye examinations at Ophthalmology  - Home glucose monitoring emphasized  - Weight control and daily exercise    Recommended medication changes: No changes at this time.  Follow up with Dr. Macdonald in 6 months.

## 2022-09-05 DIAGNOSIS — N18.30 TYPE 2 DIABETES MELLITUS WITH STAGE 3 CHRONIC KIDNEY DISEASE, WITHOUT LONG-TERM CURRENT USE OF INSULIN (HCC): ICD-10-CM

## 2022-09-05 DIAGNOSIS — E11.22 TYPE 2 DIABETES MELLITUS WITH STAGE 3 CHRONIC KIDNEY DISEASE, WITHOUT LONG-TERM CURRENT USE OF INSULIN (HCC): ICD-10-CM

## 2022-09-06 RX ORDER — BLOOD-GLUCOSE METER
KIT MISCELLANEOUS
Qty: 200 STRIP | Refills: 1 | Status: SHIPPED | OUTPATIENT
Start: 2022-09-06 | End: 2022-12-16 | Stop reason: SDUPTHER

## 2022-11-10 ENCOUNTER — PATIENT MESSAGE (OUTPATIENT)
Dept: HEALTH INFORMATION MANAGEMENT | Facility: OTHER | Age: 67
End: 2022-11-10

## 2022-11-10 ENCOUNTER — OFFICE VISIT (OUTPATIENT)
Dept: MEDICAL GROUP | Age: 67
End: 2022-11-10
Payer: MEDICARE

## 2022-11-10 VITALS
WEIGHT: 314 LBS | OXYGEN SATURATION: 96 % | HEIGHT: 72 IN | BODY MASS INDEX: 42.53 KG/M2 | DIASTOLIC BLOOD PRESSURE: 64 MMHG | SYSTOLIC BLOOD PRESSURE: 118 MMHG | TEMPERATURE: 97.7 F | HEART RATE: 92 BPM

## 2022-11-10 DIAGNOSIS — E66.01 MORBID OBESITY WITH BMI OF 40.0-44.9, ADULT (HCC): ICD-10-CM

## 2022-11-10 DIAGNOSIS — I10 ESSENTIAL HYPERTENSION: ICD-10-CM

## 2022-11-10 DIAGNOSIS — I25.2 HISTORY OF ACUTE ANTERIOR WALL MYOCARDIAL INFARCTION: ICD-10-CM

## 2022-11-10 DIAGNOSIS — R19.5 POSITIVE COLORECTAL CANCER SCREENING USING COLOGUARD TEST: ICD-10-CM

## 2022-11-10 DIAGNOSIS — E11.42 DIABETIC POLYNEUROPATHY ASSOCIATED WITH TYPE 2 DIABETES MELLITUS (HCC): ICD-10-CM

## 2022-11-10 DIAGNOSIS — Z95.810 PRESENCE OF SINGLE CHAMBER IMPLANTABLE CARDIOVERTER-DEFIBRILLATOR (ICD): ICD-10-CM

## 2022-11-10 DIAGNOSIS — E11.22 CONTROLLED TYPE 2 DIABETES MELLITUS WITH STAGE 3 CHRONIC KIDNEY DISEASE, WITHOUT LONG-TERM CURRENT USE OF INSULIN (HCC): ICD-10-CM

## 2022-11-10 DIAGNOSIS — Z00.00 MEDICARE ANNUAL WELLNESS VISIT, SUBSEQUENT: Primary | ICD-10-CM

## 2022-11-10 DIAGNOSIS — Z79.84 LONG TERM (CURRENT) USE OF ORAL HYPOGLYCEMIC DRUGS: ICD-10-CM

## 2022-11-10 DIAGNOSIS — E78.5 DYSLIPIDEMIA: ICD-10-CM

## 2022-11-10 DIAGNOSIS — N18.30 CONTROLLED TYPE 2 DIABETES MELLITUS WITH STAGE 3 CHRONIC KIDNEY DISEASE, WITHOUT LONG-TERM CURRENT USE OF INSULIN (HCC): ICD-10-CM

## 2022-11-10 DIAGNOSIS — I25.10 CORONARY ARTERY DISEASE INVOLVING NATIVE CORONARY ARTERY OF NATIVE HEART WITHOUT ANGINA PECTORIS: ICD-10-CM

## 2022-11-10 LAB
HBA1C MFR BLD: 6.4 % (ref 0–5.6)
INT CON NEG: NEGATIVE
INT CON POS: POSITIVE

## 2022-11-10 PROCEDURE — G0439 PPPS, SUBSEQ VISIT: HCPCS | Performed by: FAMILY MEDICINE

## 2022-11-10 PROCEDURE — 83036 HEMOGLOBIN GLYCOSYLATED A1C: CPT | Performed by: FAMILY MEDICINE

## 2022-11-10 RX ORDER — LANCETS 30 GAUGE
EACH MISCELLANEOUS
Qty: 100 EACH | Refills: 5 | Status: SHIPPED | OUTPATIENT
Start: 2022-11-10 | End: 2023-10-03

## 2022-11-10 ASSESSMENT — PATIENT HEALTH QUESTIONNAIRE - PHQ9: CLINICAL INTERPRETATION OF PHQ2 SCORE: 0

## 2022-11-10 ASSESSMENT — ACTIVITIES OF DAILY LIVING (ADL): BATHING_REQUIRES_ASSISTANCE: 0

## 2022-11-10 ASSESSMENT — FIBROSIS 4 INDEX: FIB4 SCORE: 1.59

## 2022-11-10 ASSESSMENT — ENCOUNTER SYMPTOMS: GENERAL WELL-BEING: GOOD

## 2022-11-10 NOTE — LETTER
[employer]  Chely Vanessa M.D.  [address]   [place] [address]  Fax: [phone]   Authorization for Release/Disclosure of   Protected Health Information   Name: PEREZ DE GUZMAN : 1955 SSN: [id number]   Address: [facility] [address]  [place] [address] Phone:    [phone] (home)    I authorize the entity listed below to release/disclose the PHI below to:   [employer]/Chely Vanessa M.D. and Chely Vanessa M.D.   Provider or Entity Name:  [facility]   Address   City, State, Zip   Phone:      Fax:     Reason for request: continuity of care   Information to be released:    [  ] LAST COLONOSCOPY,  including any PATH REPORT and follow-up  [  ] LAST FIT/COLOGUARD RESULT [  ] LAST DEXA  [  ] LAST MAMMOGRAM  [  ] LAST PAP  [  ] LAST LABS [  ] RETINA EXAM REPORT  [  ] IMMUNIZATION RECORDS  [  ] Release all info      [  ] Check here and initial the line next to each item to release ALL health information INCLUDING  _____ Care and treatment for drug and / or alcohol abuse  _____ HIV testing, infection status, or AIDS  _____ Genetic Testing    DATES OF SERVICE OR TIME PERIOD TO BE DISCLOSED: _____________  I understand and acknowledge that:  * This Authorization may be revoked at any time by you in writing, except if your health information has already been used or disclosed.  * Your health information that will be used or disclosed as a result of you signing this authorization could be re-disclosed by the recipient. If this occurs, your re-disclosed health information may no longer be protected by State or Federal laws.  * You may refuse to sign this Authorization. Your refusal will not affect your ability to obtain treatment.  * This Authorization becomes effective upon signing and will  on (date) __________.      If no date is indicated, this Authorization will  one (1) year from the signature date.    Name: Perez De Guzman    Signature:   Date:     11/10/2022       PLEASE FAX  REQUESTED RECORDS BACK TO: [phone]

## 2022-11-10 NOTE — PROGRESS NOTES
Chief Complaint   Patient presents with    Medicare Annual Wellness       HPI:  Perez Leone is a 67 y.o. here for Medicare Annual Wellness Visit     Patient Active Problem List    Diagnosis Date Noted    Diabetic polyneuropathy associated with type 2 diabetes mellitus ([facility]) 05/09/2022    Long term (current) use of oral hypoglycemic drugs 05/07/2021    Presence of single chamber implantable cardioverter-defibrillator (ICD) 08/06/2018    Positive colorectal cancer screening using Cologuard test 05/08/2017    Controlled type 2 diabetes mellitus with stage 3 chronic kidney disease, without long-term current use of insulin ([facility])_Dr. Macdonald 02/13/2017    Dyslipidemia 07/11/2016    Essential hypertension 07/11/2016    History of acute anterior wall myocardial infarction 03/09/2012    CAD s/p stent LAD in 2003_Dr. Sung 03/09/2012    Morbid obesity with BMI of 40.0-44.9, adult ([facility]) 03/09/2012       Current Outpatient Medications   Medication Sig Dispense Refill    Lancets Check blood sugar 2 times daily and prn symptoms high or low sugar. 100 Each 5    FREESTYLE LITE strip USE 1 STRIP TO CHECK GLUCOSE TWICE DAILY AND AS NEEDED FOR  SYMPTOMS  OF  HIGH  OR  LOW  BLOOD  SUGAR 200 Strip 1    atorvastatin (LIPITOR) 40 MG Tab Take 1 Tablet by mouth every evening. 90 Tablet 1    niacin SR (NIASPAN) 500 MG Tab CR Take 1 Tablet by mouth 3 times a day. 90 Tablet 6    Semaglutide,0.25 or 0.5MG/DOS, (OZEMPIC, 0.25 OR 0.5 MG/DOSE,) 2 MG/1.5ML Solution Pen-injector Inject 0.5 mg under the skin every 7 days. 1 pen per month 1.5 mL 11    pioglitazone (ACTOS) 30 MG Tab Take 1 Tablet by mouth every day. 90 Tablet 3    Empagliflozin-metFORMIN HCl ER 12.5-1000 MG TABLET SR 24 HR Take 1 Tablet by mouth 2 times a day. 60 Tablet 11    metoprolol SR (TOPROL XL) 100 MG TABLET SR 24 HR Take 1 Tablet by mouth every day. 90 Tablet 3    ENTRESTO  MG Tab tablet Take 1 Tablet by mouth 2 times a day.      BD PEN NEEDLE ROSMERY 2ND GEN USE 1  NEEDLE TO INJECT INSULIN THREE TIMES DAILY WITH MEALS 100 Each 11    Blood Glucose Monitoring Suppl Supplies Misc Test strips order: Test strips for Freestyle Lite meter. Sig: use twice a day and prn ssx high or low sugar 100 Each 3    moexipril (UNIVASC) 7.5 MG Tab Take 1 Tab by mouth every day. 90 Tab 3    [facility] ASPIRIN 81 MG PO TBEC QAM.        No current facility-administered medications for this visit.          Current supplements as per medication list.     Allergies: Patient has no known allergies.    Current social contact/activities: bowling     He  reports that he has never smoked. He has never used smokeless tobacco. He reports that he does not drink alcohol and does not use drugs.  Counseling given: Not Answered      ROS:    Gait: Uses no assistive device  Ostomy: No  Other tubes: No  Amputations: No  Chronic oxygen use: No  Last eye exam: 2 days ago  Wears hearing aids: No   : Denies any urinary leakage during the last 6 months    Screening:  Depression Screening  Little interest or pleasure in doing things?  0 - not at all  Feeling down, depressed , or hopeless? 0 - not at all  Patient Health Questionnaire Score: 0     Screening for Cognitive Impairment  Three Minute Recall (daughter, heaven, mountain) 2/3    Gerhard clock face with all 12 numbers and set the hands to show 10 past 11.  Yes    Cognitive concerns identified deferred for follow up unless specifically addressed in assessment and plan.    Fall Risk Assessment  Has the patient had two or more falls in the last year or any fall with injury in the last year?  No    Safety Assessment  Throw rugs on floor.  No  Handrails on all stairs.  Yes  Good lighting in all hallways.  Yes  Difficulty hearing.  No  Patient counseled about all safety risks that were identified.    Functional Assessment ADLs  Are there any barriers preventing you from cooking for yourself or meeting nutritional needs?  No.    Are there any barriers preventing you from  driving safely or obtaining transportation?  No.    Are there any barriers preventing you from using a telephone or calling for help?  No.    Are there any barriers preventing you from shopping?  No.    Are there any barriers preventing you from taking care of your own finances?  No.    Are there any barriers preventing you from managing your medications?  No.    Are there any barriers preventing you from showering, bathing or dressing yourself?  No.    Are you currently engaging in any exercise or physical activity?  Yes.     What is your perception of your health?  Good    Advance Care Planning  Do you have an Advance Directive, Living Will, Durable Power of , or POLST? No                 Health Maintenance Summary            Overdue - IMM HEP B VACCINE (3 of 3 - 3-dose series) Overdue since 6/17/2021 04/22/2021  Imm Admin: Hepatitis B Vaccine (Adol/Adult)    10/19/2020  Imm Admin: Hepatitis B Vaccine (Adol/Adult)              Overdue - RETINAL SCREENING (Yearly) Overdue since 11/3/2022      11/03/2021  REFERRAL FOR RETINAL SCREENING EXAM    10/26/2020  REFERRAL FOR RETINAL SCREENING EXAM    10/21/2019  REFERRAL FOR RETINAL SCREENING EXAM    10/15/2018  REFERRAL FOR RETINAL SCREENING EXAM    10/11/2017  REFERRAL FOR RETINAL SCREENING EXAM    Only the first 5 history entries have been loaded, but more history exists.              Ordered - FASTING LIPID PROFILE (Yearly) Ordered on 11/10/2022      03/21/2022  LIPID PANEL    09/27/2021  LIPID PANEL    10/12/2020  LIPID PANEL    08/31/2020  LIPID PANEL    11/11/2019  LIPID PANEL    Only the first 5 history entries have been loaded, but more history exists.              Ordered - URINE ACR / MICROALBUMIN (Yearly) Ordered on 11/10/2022      03/21/2022  MICROALB/CREAT RATIO RAND. UR    09/27/2021  MICROALB/CREAT RATIO RAND. UR    10/12/2020  MICROALB/CREAT RATIO RAND. UR    08/31/2020  MICROALB/CREAT RATIO RAND. UR    11/11/2019  MICROALBUM. UR RANDOM     Only the first 5 history entries have been loaded, but more history exists.              Ordered - SERUM CREATININE (Yearly) Ordered on 11/10/2022      03/21/2022  COMP METABOLIC PANEL    09/27/2021  COMP METABOLIC PANEL    04/12/2021  COMP METABOLIC PANEL    10/12/2020  Comp Metabolic Panel    08/31/2020  Comp Metabolic Panel    Only the first 5 history entries have been loaded, but more history exists.              A1C SCREENING (Every 6 Months) Next due on 5/10/2023      11/10/2022  POCT Hemoglobin A1C    08/29/2022  POCT Hemoglobin A1C    03/15/2022  POCT Hemoglobin A1C    12/14/2021  POCT Hemoglobin A1C    09/14/2021  POCT Hemoglobin A1C    Only the first 5 history entries have been loaded, but more history exists.              DIABETES MONOFILAMENT / LE EXAM (Yearly) Next due on 8/29/2023 08/29/2022  Diabetic Monofilament LE Exam    08/29/2022  Smart[employer]: WORKFLOW - DIABETES - DIABETIC FOOT EXAM PERFORMED    03/15/2022  SmartData: WORKFLOW - DIABETES - DIABETIC FOOT EXAM PERFORMED    03/15/2022  Diabetic Monofilament LE Exam    12/14/2021  Diabetic Monofilament LE Exam    Only the first 5 history entries have been loaded, but more history exists.              Annual Wellness Visit (Every 366 Days) Next due on 11/11/2023      11/10/2022  Level of Service: ANNUAL WELLNESS VISIT-INCLUDES PPPS SUBSEQUE*    11/10/2022  Visit Dx: Medicare annual wellness visit, subsequent    10/19/2020  Visit Dx: Medicare annual wellness visit, subsequent    04/04/2017  Visit Dx: Medicare annual wellness visit, initial              Ordered - COLORECTAL CANCER SCREENING (COLOGUARD STOOL DNA - Every 3 Years) Ordered on 5/24/2022 05/16/2022  COLOGUARD COLON CANCER SCREENING    04/09/2017  OCCULT BLOOD FECES IMMUNOASSAY    01/20/2014  OCCULT BLOOD FECES IMMUNOASSAY              IMM DTaP/Tdap/Td Vaccine (2 - Td or Tdap) Next due on 10/7/2029      10/07/2019  Imm Admin: Tdap Vaccine              HEPATITIS C SCREENING  Completed       11/11/2019  HEP C VIRUS ANTIBODY              IMM ZOSTER VACCINES (Series Information) Completed      10/28/2021  Imm Admin: Zoster Vaccine Recombinant (RZV) (SHINGRIX)    04/22/2021  Imm Admin: Zoster Vaccine Recombinant (RZV) (SHINGRIX)              IMM PNEUMOCOCCAL VACCINE: 65+ Years (Series Information) Completed      05/09/2022  Imm Admin: Pneumococcal Conjugate Vaccine (PCV20)    09/24/2020  Imm Admin: Pneumococcal Conjugate Vaccine (Prevnar/PCV-13)    03/30/2016  Imm Admin: Pneumococcal polysaccharide vaccine (PPSV-23)              IMM INFLUENZA (Series Information) Completed      09/26/2022  Imm Admin: Influenza Vaccine Adult HD    09/29/2021  Imm Admin: Influenza Vaccine Adult HD    09/24/2020  Imm Admin: Influenza Vaccine Adult HD    10/07/2019  Imm Admin: Influenza Vaccine Quad Inj (Pf)    09/05/2018  Imm Admin: Influenza Vaccine Quad Inj (Pf)    Only the first 5 history entries have been loaded, but more history exists.              COVID-19 Vaccine (Series Information) Completed      09/26/2022  Imm Admin: PFIZER BIVALENT BOOSTER SARS-COV-2 VACCINE (12+)    04/18/2022  Imm Admin: PFIZER ZHU CAP SARS-COV-2 VACCINATION (12+)    09/29/2021  Imm Admin: PFIZER PURPLE CAP SARS-COV-2 VACCINATION (12+)    03/21/2021  Imm Admin: PFIZER PURPLE CAP SARS-COV-2 VACCINATION (12+)    02/28/2021  Imm Admin: PFIZER PURPLE CAP SARS-COV-2 VACCINATION (12+)              IMM MENINGOCOCCAL ACWY VACCINE (Series Information) Aged Out      No completion history exists for this topic.                    Patient Care Team:  Chely Vanessa M.D. as PCP - General (Family Medicine)  Tunde Sung M.D. as Consulting Physician (Cardiovascular Disease (Cardiology))  Jacob Evans M.D. (Inactive) as Consulting Physician (Ophthalmology)  Darius Macdonald M.D. as Consulting Physician (Endocrinology)        Social History     Tobacco Use    Smoking status: Never    Smokeless tobacco: Never   Vaping Use    Vaping Use: Never used    Substance Use Topics    Alcohol use: No     Alcohol/week: 0.0 oz    Drug use: No     Family History   Problem Relation Age of Onset    Heart Disease Mother         CHF    Diabetes Brother     Other Father         son does not father's med hx     He  has a past medical history of Acute MI (HCC), Diabetes mellitus, Heme positive stool (6/8/2015), Hypertension, Overweight and obesity, and Pure hypercholesterolemia.   History reviewed. No pertinent surgical history.    Exam:   /64 (BP Location: Right arm, Patient Position: Sitting, BP Cuff Size: Large adult)   Pulse 92   Temp 36.5 °C (97.7 °F) (Temporal)   Ht 1.829 m (6')   Wt (!) 142 kg (314 lb)   SpO2 96%  Body mass index is 42.59 kg/m².    Hearing excellent.    Dentition good  Alert, oriented in no acute distress.  Eye contact is good, speech goal directed, affect calm    Assessment and Plan. The following treatment and monitoring plan is recommended:      1. Controlled type 2 diabetes mellitus with stage 3 chronic kidney disease, without long-term current use of insulin ([facility])_Dr. Macdonald  2. Diabetic polyneuropathy associated with type 2 diabetes mellitus (HCC)  3. Long term (current) use of oral hypoglycemic drugs  Chronic, controlled, A1c was 6.4 in the clinic today.  Patient is taking empagliflozin-metformin 12.5-1000 mg twice daily, pioglitazone 30 mg daily, semaglutide 0.5 mg weekly.  This condition is currently being managed by endocrinology.  Last visit with Dr. Macdonald was in March 2022.  Patient tolerated all medication well, no side effects reported.  He had eye exam done couple days ago, records are being requested.  Polyneuropathy is mild and does not require treatment.  Patient lost 11 pounds since May 2022.  - POCT Hemoglobin A1C  - Lancets; Check blood sugar 2 times daily and prn symptoms high or low sugar.  Dispense: 100 Each; Refill: 5  - CBC WITH DIFFERENTIAL; Future  - Comp Metabolic Panel; Future  - MICROALBUMIN CREAT RATIO URINE;  Future  - VITAMIN D,25 HYDROXY (DEFICIENCY); Future  - Continue empagliflozin-metformin, pioglitazone, and semaglutide as directed.  - Follow-up with endocrinology as directed  - dietary modification, exercise, weight loss  - Annual eye exam  - Regular foot exam  - Discussed prevention and management of hypoglycemia  - Side effects of all medications discussed with patient  - Follow up in 6 months.       4. Essential hypertension  Chronic, controlled with metoprolol  mg daily, moexipril 7.5 mg daily, Entresto  mg twice daily, no s/e reported, will continue.      5. Dyslipidemia  Chronic, controlled with atorvastatin 40 mg daily, no s/e reported, will continue.    Patient lost 11 pounds since May 2022.  - Lipid Profile; Future  - dietary modification, exercise, and weight loss.   - avoid alcohol, drugs, tobacco products     6. History of acute anterior wall myocardial infarction  7. Coronary artery disease involving native coronary artery of native heart without angina pectoris  8. Presence of single chamber implantable cardioverter-defibrillator (ICD)  Chronic, stable, has consistent following up with cardiology.  Blood pressure, diabetes, hyperlipidemia are controlled with pharmacotherapy.  Negative chest pain, shortness of breath, dyspnea on exertion.  Patient is able to lose weight with Ozempic.  -Continue aspirin, statin  -Follow-up with cardiology as directed    9.  Positive Cologuard  Patient was found to have positive fit tests and recently positive Cologuard in May 2022.  Patient was referred to GI a number of times, but he declined to schedule appointment.  He does not have any active GI symptoms today.  I again discussed the need for colonoscopy with patient.  Appropriate counseling provided.  Patient is planning to schedule appointment with GI for colonoscopy.    10. Morbid obesity with BMI of 40.0-44.9, adult (HCC)  Chronic, currently on Ozempic 0.5 mg weekly for diabetes.  He is able to lose  weight slowly.  He lost approximately 11 pounds and May 2022.  He tolerates Ozempic well, no side effects reported.  He is active, but does not exercise regularly.  - Patient identified as having weight management issue.  Appropriate orders and counseling given.     11. Medicare annual wellness visit, subsequent  I reviewed PMH, social history, family history.   I reviewed and updated vaccines records.  Patient is due for final hepatitis B vaccine which I will provide to patient at next office visit as this vaccine is not available in the clinic today.  I reviewed and discussed all age-appropriate preventive cares.    General health/wellness and anticipatory guidance provided.         Services suggested: No services needed at this time  Health Care Screening: Age-appropriate preventive services recommended by USPTF and ACIP covered by Medicare were discussed today. Services ordered if indicated and agreed upon by the patient.  Referrals offered: Community-based lifestyle interventions to reduce health risks and promote self-management and wellness, fall prevention, nutrition, physical activity, tobacco-use cessation, weight loss, and mental health services as per orders if indicated.    Discussion today about general wellness and lifestyle habits:    Prevent falls and reduce trip hazards; Cautioned about securing or removing rugs.  Have a working fire alarm and carbon monoxide detector;   Engage in regular physical activity and social activities     Follow-up: Return in about 6 months (around 5/10/2023) for Multiple issues.

## 2022-11-15 LAB
25(OH)D3+25(OH)D2 SERPL-MCNC: 15.6 NG/ML (ref 30–100)
ALBUMIN SERPL-MCNC: 4 G/DL (ref 3.8–4.8)
ALBUMIN/CREAT UR: 33 MG/G CREAT (ref 0–29)
ALBUMIN/GLOB SERPL: 1.7 {RATIO} (ref 1.2–2.2)
ALP SERPL-CCNC: 75 IU/L (ref 44–121)
ALT SERPL-CCNC: 12 IU/L (ref 0–44)
AST SERPL-CCNC: 15 IU/L (ref 0–40)
BASOPHILS # BLD AUTO: 0.1 X10E3/UL (ref 0–0.2)
BASOPHILS NFR BLD AUTO: 1 %
BILIRUB SERPL-MCNC: 0.6 MG/DL (ref 0–1.2)
BUN SERPL-MCNC: 19 MG/DL (ref 8–27)
BUN/CREAT SERPL: 15 (ref 10–24)
CALCIUM SERPL-MCNC: 9.2 MG/DL (ref 8.6–10.2)
CHLORIDE SERPL-SCNC: 108 MMOL/L (ref 96–106)
CHOLEST SERPL-MCNC: 113 MG/DL (ref 100–199)
CO2 SERPL-SCNC: 19 MMOL/L (ref 20–29)
CREAT SERPL-MCNC: 1.26 MG/DL (ref 0.76–1.27)
CREAT UR-MCNC: 98.9 MG/DL
EGFRCR SERPLBLD CKD-EPI 2021: 63 ML/MIN/1.73
EOSINOPHIL # BLD AUTO: 0.4 X10E3/UL (ref 0–0.4)
EOSINOPHIL NFR BLD AUTO: 6 %
ERYTHROCYTE [DISTWIDTH] IN BLOOD BY AUTOMATED COUNT: 13.4 % (ref 11.6–15.4)
GLOBULIN SER CALC-MCNC: 2.3 G/DL (ref 1.5–4.5)
GLUCOSE SERPL-MCNC: 111 MG/DL (ref 70–99)
HCT VFR BLD AUTO: 40.9 % (ref 37.5–51)
HDLC SERPL-MCNC: 41 MG/DL
HGB BLD-MCNC: 13.7 G/DL (ref 13–17.7)
IMM GRANULOCYTES # BLD AUTO: 0 X10E3/UL (ref 0–0.1)
IMM GRANULOCYTES NFR BLD AUTO: 0 %
IMMATURE CELLS  115398: ABNORMAL
LABORATORY COMMENT REPORT: NORMAL
LDLC SERPL CALC-MCNC: 54 MG/DL (ref 0–99)
LYMPHOCYTES # BLD AUTO: 1.9 X10E3/UL (ref 0.7–3.1)
LYMPHOCYTES NFR BLD AUTO: 29 %
MCH RBC QN AUTO: 33.1 PG (ref 26.6–33)
MCHC RBC AUTO-ENTMCNC: 33.5 G/DL (ref 31.5–35.7)
MCV RBC AUTO: 99 FL (ref 79–97)
MICROALBUMIN UR-MCNC: 32.3 UG/ML
MONOCYTES # BLD AUTO: 0.6 X10E3/UL (ref 0.1–0.9)
MONOCYTES NFR BLD AUTO: 10 %
MORPHOLOGY BLD-IMP: ABNORMAL
NEUTROPHILS # BLD AUTO: 3.4 X10E3/UL (ref 1.4–7)
NEUTROPHILS NFR BLD AUTO: 54 %
NRBC BLD AUTO-RTO: ABNORMAL %
PLATELET # BLD AUTO: 197 X10E3/UL (ref 150–450)
POTASSIUM SERPL-SCNC: 4.7 MMOL/L (ref 3.5–5.2)
PROT SERPL-MCNC: 6.3 G/DL (ref 6–8.5)
RBC # BLD AUTO: 4.14 X10E6/UL (ref 4.14–5.8)
SODIUM SERPL-SCNC: 139 MMOL/L (ref 134–144)
TRIGL SERPL-MCNC: 95 MG/DL (ref 0–149)
VLDLC SERPL CALC-MCNC: 18 MG/DL (ref 5–40)
WBC # BLD AUTO: 6.4 X10E3/UL (ref 3.4–10.8)

## 2022-12-16 DIAGNOSIS — N18.30 TYPE 2 DIABETES MELLITUS WITH STAGE 3 CHRONIC KIDNEY DISEASE, WITHOUT LONG-TERM CURRENT USE OF INSULIN (HCC): ICD-10-CM

## 2022-12-16 DIAGNOSIS — E11.22 TYPE 2 DIABETES MELLITUS WITH STAGE 3 CHRONIC KIDNEY DISEASE, WITHOUT LONG-TERM CURRENT USE OF INSULIN (HCC): ICD-10-CM

## 2022-12-16 RX ORDER — BLOOD-GLUCOSE METER
KIT MISCELLANEOUS
Qty: 200 STRIP | Refills: 5 | Status: SHIPPED | OUTPATIENT
Start: 2022-12-16

## 2023-02-21 DIAGNOSIS — E11.22 CONTROLLED TYPE 2 DIABETES MELLITUS WITH STAGE 3 CHRONIC KIDNEY DISEASE, WITHOUT LONG-TERM CURRENT USE OF INSULIN (HCC): ICD-10-CM

## 2023-02-21 DIAGNOSIS — N18.30 CONTROLLED TYPE 2 DIABETES MELLITUS WITH STAGE 3 CHRONIC KIDNEY DISEASE, WITHOUT LONG-TERM CURRENT USE OF INSULIN (HCC): ICD-10-CM

## 2023-02-21 RX ORDER — EMPAGLIFLOZIN, METFORMIN HYDROCHLORIDE 12.5; 1 MG/1; MG/1
1 TABLET, EXTENDED RELEASE ORAL 2 TIMES DAILY
Qty: 200 TABLET | Refills: 0 | Status: SHIPPED | OUTPATIENT
Start: 2023-02-21 | End: 2023-06-01

## 2023-02-22 NOTE — TELEPHONE ENCOUNTER
Received request via: Pharmacy    Was the patient seen in the last year in this department? Yes    Does the patient have an active prescription (recently filled or refills available) for medication(s) requested? No    Does the patient have halfway Plus and need 100 day supply (blood pressure, diabetes and cholesterol meds only)? Patient does not have SCP

## 2023-05-12 ENCOUNTER — APPOINTMENT (OUTPATIENT)
Dept: MEDICAL GROUP | Age: 68
End: 2023-05-12
Payer: MEDICARE

## 2023-10-03 DIAGNOSIS — N18.30 CONTROLLED TYPE 2 DIABETES MELLITUS WITH STAGE 3 CHRONIC KIDNEY DISEASE, WITHOUT LONG-TERM CURRENT USE OF INSULIN (HCC): ICD-10-CM

## 2023-10-03 DIAGNOSIS — E11.22 CONTROLLED TYPE 2 DIABETES MELLITUS WITH STAGE 3 CHRONIC KIDNEY DISEASE, WITHOUT LONG-TERM CURRENT USE OF INSULIN (HCC): ICD-10-CM

## 2023-10-03 RX ORDER — LANCETS 28 GAUGE
EACH MISCELLANEOUS
Qty: 100 EACH | Refills: 0 | Status: SHIPPED | OUTPATIENT
Start: 2023-10-03 | End: 2023-11-14

## 2024-01-22 DIAGNOSIS — E11.22 CONTROLLED TYPE 2 DIABETES MELLITUS WITH STAGE 3 CHRONIC KIDNEY DISEASE, WITHOUT LONG-TERM CURRENT USE OF INSULIN (HCC): ICD-10-CM

## 2024-01-22 DIAGNOSIS — N18.30 CONTROLLED TYPE 2 DIABETES MELLITUS WITH STAGE 3 CHRONIC KIDNEY DISEASE, WITHOUT LONG-TERM CURRENT USE OF INSULIN (HCC): ICD-10-CM

## 2024-01-22 RX ORDER — LANCETS 28 GAUGE
EACH MISCELLANEOUS
Qty: 100 EACH | Refills: 0 | OUTPATIENT
Start: 2024-01-22

## 2024-01-22 RX ORDER — LANCETS 28 GAUGE
EACH MISCELLANEOUS
Qty: 100 EACH | Refills: 0 | Status: SHIPPED | OUTPATIENT
Start: 2024-01-22

## 2024-09-20 NOTE — TELEPHONE ENCOUNTER
Phone Number Called: [phone]    Call outcome: Spoke to patient regarding message below.    Message: Scheduled patient for 9/18 at 9:20am      Quality 226: Preventive Care And Screening: Tobacco Use: Screening And Cessation Intervention: Patient screened for tobacco use and is an ex/non-smoker Detail Level: Detailed